# Patient Record
Sex: MALE | Race: WHITE | HISPANIC OR LATINO | Employment: OTHER | URBAN - METROPOLITAN AREA
[De-identification: names, ages, dates, MRNs, and addresses within clinical notes are randomized per-mention and may not be internally consistent; named-entity substitution may affect disease eponyms.]

---

## 2020-07-13 ENCOUNTER — OFFICE VISIT (OUTPATIENT)
Dept: URGENT CARE | Age: 30
End: 2020-07-13
Payer: COMMERCIAL

## 2020-07-13 VITALS
HEART RATE: 74 BPM | BODY MASS INDEX: 33.13 KG/M2 | TEMPERATURE: 98.1 F | HEIGHT: 73 IN | WEIGHT: 250 LBS | OXYGEN SATURATION: 99 %

## 2020-07-13 DIAGNOSIS — R43.2 LOSS OF TASTE: Primary | ICD-10-CM

## 2020-07-13 PROCEDURE — G0382 LEV 3 HOSP TYPE B ED VISIT: HCPCS | Performed by: PHYSICIAN ASSISTANT

## 2020-07-13 PROCEDURE — U0003 INFECTIOUS AGENT DETECTION BY NUCLEIC ACID (DNA OR RNA); SEVERE ACUTE RESPIRATORY SYNDROME CORONAVIRUS 2 (SARS-COV-2) (CORONAVIRUS DISEASE [COVID-19]), AMPLIFIED PROBE TECHNIQUE, MAKING USE OF HIGH THROUGHPUT TECHNOLOGIES AS DESCRIBED BY CMS-2020-01-R: HCPCS | Performed by: PHYSICIAN ASSISTANT

## 2020-07-13 RX ORDER — OMEPRAZOLE 20 MG/1
20 CAPSULE, DELAYED RELEASE ORAL 2 TIMES DAILY
COMMUNITY
Start: 2020-06-13 | End: 2022-01-26

## 2020-07-13 RX ORDER — LEVOTHYROXINE SODIUM 0.03 MG/1
25 TABLET ORAL DAILY
COMMUNITY
Start: 2020-04-09 | End: 2021-06-22

## 2020-07-13 NOTE — LETTER
July 13, 2020     Patient: Jayme Pritchard   YOB: 1990   Date of Visit: 7/13/2020       To Whom It May Concern: It is my medical opinion that Oleg Hoff should remain out of work until cleared by physician  If you have any questions or concerns, please don't hesitate to call           Sincerely,        Shelley Cintron PA-C    CC: No Recipients

## 2020-07-13 NOTE — PATIENT INSTRUCTIONS
Continue to monitor symptoms  If new or worsening symptoms develop, go immediately to Er  Drink plenty of fluids  Follow up with Family Doctor this week  [address]    Your healthcare provider and/or public health staff have evaluated you and have determined that you do not need to remain in the hospital at this time  At this time you can be isolated at home where you will be monitored by staff from your local or state health department  You should carefully follow the prevention and isolation steps below until a healthcare provider or local or state health department says that you can return to your normal activities  Stay home except to get medical care    People who are mildly ill with COVID-19 are able to isolate at home during their illness  You should restrict activities outside your home, except for getting medical care  Do not go to work, school, or public areas  Avoid using public transportation, ride-sharing, or taxis  Separate yourself from other people and animals in your home    People: As much as possible, you should stay in a specific room and away from other people in your home  Also, you should use a separate bathroom, if available  Animals: You should restrict contact with pets and other animals while you are sick with COVID-19, just like you would around other people  Although there have not been reports of pets or other animals becoming sick with COVID-19, it is still recommended that people sick with COVID-19 limit contact with animals until more information is known about the virus  When possible, have another member of your household care for your animals while you are sick  If you are sick with COVID-19, avoid contact with your pet, including petting, snuggling, being kissed or licked, and sharing food  If you must care for your pet or be around animals while you are sick, wash your hands before and after you interact with pets and wear a facemask   See COVID-19 and Animals for more information  Call ahead before visiting your doctor    If you have a medical appointment, call the healthcare provider and tell them that you have or may have COVID-19  This will help the healthcare providers office take steps to keep other people from getting infected or exposed  Wear a facemask    You should wear a facemask when you are around other people (e g , sharing a room or vehicle) or pets and before you enter a healthcare providers office  If you are not able to wear a facemask (for example, because it causes trouble breathing), then people who live with you should not stay in the same room with you, or they should wear a facemask if they enter your room  Cover your coughs and sneezes    Cover your mouth and nose with a tissue when you cough or sneeze  Throw used tissues in a lined trash can  Immediately wash your hands with soap and water for at least 20 seconds or, if soap and water are not available, clean your hands with an alcohol-based hand  that contains at least 60% alcohol  Clean your hands often    Wash your hands often with soap and water for at least 20 seconds, especially after blowing your nose, coughing, or sneezing; going to the bathroom; and before eating or preparing food  If soap and water are not readily available, use an alcohol-based hand  with at least 60% alcohol, covering all surfaces of your hands and rubbing them together until they feel dry  Soap and water are the best option if hands are visibly dirty  Avoid touching your eyes, nose, and mouth with unwashed hands  Avoid sharing personal household items    You should not share dishes, drinking glasses, cups, eating utensils, towels, or bedding with other people or pets in your home  After using these items, they should be washed thoroughly with soap and water      Clean all high-touch surfaces everyday    High touch surfaces include counters, tabletops, doorknobs, bathroom fixtures, toilets, phones, keyboards, tablets, and bedside tables  Also, clean any surfaces that may have blood, stool, or body fluids on them  Use a household cleaning spray or wipe, according to the label instructions  Labels contain instructions for safe and effective use of the cleaning product including precautions you should take when applying the product, such as wearing gloves and making sure you have good ventilation during use of the product  Monitor your symptoms    Seek prompt medical attention if your illness is worsening (e g , difficulty breathing)  Before seeking care, call your healthcare provider and tell them that you have, or are being evaluated for, COVID-19  Put on a facemask before you enter the facility  These steps will help the healthcare providers office to keep other people in the office or waiting room from getting infected or exposed  Ask your healthcare provider to call the local or [facility] health department  Persons who are placed under active monitoring or facilitated self-monitoring should follow instructions provided by their local health department or occupational health professionals, as appropriate  If you have a medical emergency and need to call 911, notify the dispatch personnel that you have, or are being evaluated for COVID-19  If possible, put on a facemask before emergency medical services arrive      Discontinuing home isolation    Patients with confirmed COVID-19 should remain under home isolation precautions until the following conditions are met:   - They have had no fever for at least 72 hours (that is three full days of no fever without the use medicine that reduces fevers)  AND  - other symptoms have improved (for example, when their cough or shortness of breath have improved)  AND  - at least 10 days have passed since their symptoms first appeared  Patients with confirmed COVID-19 should also notify close contacts (including their workplace) and ask that they self-quarantine  Currently, close contact is defined as being within 6 feet for 10 minutes or more from the period 48 hours before symptom onset to the time at which the patient went into isolation  Close contacts of patients diagnosed with COVID-19 should be instructed by the patient to self-quarantine for 14 days from the last time of their last contact with the patient       Source: [employer]

## 2020-07-13 NOTE — PROGRESS NOTES
[facility]        NAME: Kiran Redding is a 31 y o  male  : 1990    MRN: [id number]  DATE: 2020  TIME: 4:22 PM    Assessment and Plan   Loss of taste [R43 2]  1  Loss of taste  MISC COVID-19 TEST- Office Collection         Patient Instructions       Continue to monitor symptoms  If new or worsening symptoms develop, go immediately to Er  Drink plenty of fluids  Follow up with Family Doctor this week  [address]    Your healthcare provider and/or public health staff have evaluated you and have determined that you do not need to remain in the hospital at this time  At this time you can be isolated at home where you will be monitored by staff from your local or state health department  You should carefully follow the prevention and isolation steps below until a healthcare provider or local or state health department says that you can return to your normal activities  Stay home except to get medical care    People who are mildly ill with COVID-19 are able to isolate at home during their illness  You should restrict activities outside your home, except for getting medical care  Do not go to work, school, or public areas  Avoid using public transportation, ride-sharing, or taxis  Separate yourself from other people and animals in your home    People: As much as possible, you should stay in a specific room and away from other people in your home  Also, you should use a separate bathroom, if available  Animals: You should restrict contact with pets and other animals while you are sick with COVID-19, just like you would around other people  Although there have not been reports of pets or other animals becoming sick with COVID-19, it is still recommended that people sick with COVID-19 limit contact with animals until more information is known about the virus  When possible, have another member of your household care for your animals while you are sick   If you are sick with COVID-19, avoid contact with your pet, including petting, snuggling, being kissed or licked, and sharing food  If you must care for your pet or be around animals while you are sick, wash your hands before and after you interact with pets and wear a facemask  See COVID-19 and Animals for more information  Call ahead before visiting your doctor    If you have a medical appointment, call the healthcare provider and tell them that you have or may have COVID-19  This will help the healthcare providers office take steps to keep other people from getting infected or exposed  Wear a facemask    You should wear a facemask when you are around other people (e g , sharing a room or vehicle) or pets and before you enter a healthcare providers office  If you are not able to wear a facemask (for example, because it causes trouble breathing), then people who live with you should not stay in the same room with you, or they should wear a facemask if they enter your room  Cover your coughs and sneezes    Cover your mouth and nose with a tissue when you cough or sneeze  Throw used tissues in a lined trash can  Immediately wash your hands with soap and water for at least 20 seconds or, if soap and water are not available, clean your hands with an alcohol-based hand  that contains at least 60% alcohol  Clean your hands often    Wash your hands often with soap and water for at least 20 seconds, especially after blowing your nose, coughing, or sneezing; going to the bathroom; and before eating or preparing food  If soap and water are not readily available, use an alcohol-based hand  with at least 60% alcohol, covering all surfaces of your hands and rubbing them together until they feel dry  Soap and water are the best option if hands are visibly dirty  Avoid touching your eyes, nose, and mouth with unwashed hands      Avoid sharing personal household items    You should not share dishes, drinking glasses, cups, eating utensils, towels, or bedding with other people or pets in your home  After using these items, they should be washed thoroughly with soap and water  Clean all high-touch surfaces everyday    High touch surfaces include counters, tabletops, doorknobs, bathroom fixtures, toilets, phones, keyboards, tablets, and bedside tables  Also, clean any surfaces that may have blood, stool, or body fluids on them  Use a household cleaning spray or wipe, according to the label instructions  Labels contain instructions for safe and effective use of the cleaning product including precautions you should take when applying the product, such as wearing gloves and making sure you have good ventilation during use of the product  Monitor your symptoms    Seek prompt medical attention if your illness is worsening (e g , difficulty breathing)  Before seeking care, call your healthcare provider and tell them that you have, or are being evaluated for, COVID-19  Put on a facemask before you enter the facility  These steps will help the healthcare providers office to keep other people in the office or waiting room from getting infected or exposed  Ask your healthcare provider to call the local or [facility] health department  Persons who are placed under active monitoring or facilitated self-monitoring should follow instructions provided by their local health department or occupational health professionals, as appropriate  If you have a medical emergency and need to call 911, notify the dispatch personnel that you have, or are being evaluated for COVID-19  If possible, put on a facemask before emergency medical services arrive      Discontinuing home isolation    Patients with confirmed COVID-19 should remain under home isolation precautions until the following conditions are met:   - They have had no fever for at least 72 hours (that is three full days of no fever without the use medicine that reduces fevers)  AND  - other symptoms have improved (for example, when their cough or shortness of breath have improved)  AND  - at least 10 days have passed since their symptoms first appeared  Patients with confirmed COVID-19 should also notify close contacts (including their workplace) and ask that they self-quarantine  Currently, close contact is defined as being within 6 feet for 10 minutes or more from the period 48 hours before symptom onset to the time at which the patient went into isolation  Close contacts of patients diagnosed with COVID-19 should be instructed by the patient to self-quarantine for 14 days from the last time of their last contact with the patient  Source: [employer]       Chief Complaint     Chief Complaint   Patient presents with    Fatigue     symptoms started about a week ago  pt has body aches, headache, loose stools and loss of taste  symptoms started about a week feels like he has the flu  History of Present Illness       Sore Throat    This is a new problem  Episode onset: 1 week ago  The problem has been gradually worsening  Neither side of throat is experiencing more pain than the other  Maximum temperature: unk but pt has chills  The pain is mild (itching)  Associated symptoms include congestion, coughing and a hoarse voice  Pertinent negatives include no diarrhea, drooling, headaches, neck pain, shortness of breath, swollen glands, trouble swallowing or vomiting  He has had no exposure to strep or mono  He has tried nothing for the symptoms  The treatment provided no relief  Pt has numerous family members that are COVID(+)  Review of Systems   Review of Systems   Constitutional: Positive for chills and fatigue  Negative for diaphoresis and fever  HENT: Positive for congestion, hoarse voice, sinus pressure and sore throat   Negative for drooling, postnasal drip, sinus pain, sneezing and trouble swallowing  Eyes: Negative  Respiratory: Positive for cough  Negative for chest tightness, shortness of breath and wheezing  Cardiovascular: Negative  Negative for chest pain and palpitations  Gastrointestinal: Negative for abdominal distention, diarrhea, nausea and vomiting  Endocrine: Negative  Genitourinary: Negative for dysuria  Musculoskeletal: Positive for myalgias  Negative for neck pain  Skin: Negative for pallor and rash  Allergic/Immunologic: Negative  Neurological: Negative  Negative for light-headedness and headaches  Hematological: Negative  Psychiatric/Behavioral: Negative  Current Medications       Current Outpatient Medications:     levothyroxine 25 mcg tablet, Take 25 mcg by mouth daily, Disp: , Rfl:     omeprazole (PriLOSEC) 20 mg delayed release capsule, Take 20 mg by mouth 2 (two) times a day, Disp: , Rfl:     Current Allergies     Allergies as of 07/13/2020 - Reviewed 07/13/2020   Allergen Reaction Noted    Penicillins  07/13/2020            The following portions of the patient's history were reviewed and updated as appropriate: allergies, current medications, past family history, past medical history, past social history, past surgical history and problem list      History reviewed  No pertinent past medical history  History reviewed  No pertinent surgical history  History reviewed  No pertinent family history  Medications have been verified  Objective   Pulse 74   Temp 98 1 °F (36 7 °C)   Ht 6' 1" (1 854 m)   Wt 113 kg (250 lb)   SpO2 99%   BMI 32 98 kg/m²        Physical Exam     Physical Exam   Constitutional: He appears well-developed and well-nourished  No distress  HENT:   Head: Normocephalic and atraumatic  Right Ear: External ear normal    Left Ear: External ear normal    Nose: Nose normal    Mouth/Throat: Oropharynx is clear and moist  No oropharyngeal exudate     Eyes: Conjunctivae are normal  Right eye exhibits no discharge  Left eye exhibits no discharge  Neck: Normal range of motion  Neck supple  Cardiovascular: Normal rate, regular rhythm, normal heart sounds and intact distal pulses  Pulmonary/Chest: Effort normal and breath sounds normal  No respiratory distress  He has no wheezes  He has no rales  Lymphadenopathy:     He has no cervical adenopathy  Skin: Skin is warm  Capillary refill takes less than 2 seconds  No rash noted  He is not diaphoretic  No pallor  Nursing note and vitals reviewed

## 2020-07-23 LAB — SARS-COV-2 RNA SPEC QL NAA+PROBE: NOT DETECTED

## 2021-06-22 ENCOUNTER — OFFICE VISIT (OUTPATIENT)
Dept: INTERNAL MEDICINE CLINIC | Age: 31
End: 2021-06-22
Payer: COMMERCIAL

## 2021-06-22 VITALS
BODY MASS INDEX: 36.1 KG/M2 | WEIGHT: 272.4 LBS | TEMPERATURE: 97.5 F | HEART RATE: 90 BPM | HEIGHT: 73 IN | DIASTOLIC BLOOD PRESSURE: 80 MMHG | SYSTOLIC BLOOD PRESSURE: 122 MMHG | OXYGEN SATURATION: 97 %

## 2021-06-22 DIAGNOSIS — K21.9 GASTROESOPHAGEAL REFLUX DISEASE WITHOUT ESOPHAGITIS: ICD-10-CM

## 2021-06-22 DIAGNOSIS — R00.2 PALPITATIONS: ICD-10-CM

## 2021-06-22 DIAGNOSIS — E03.9 HYPOTHYROIDISM, UNSPECIFIED TYPE: Primary | ICD-10-CM

## 2021-06-22 PROCEDURE — 99203 OFFICE O/P NEW LOW 30 MIN: CPT | Performed by: INTERNAL MEDICINE

## 2021-06-22 PROCEDURE — 93000 ELECTROCARDIOGRAM COMPLETE: CPT | Performed by: INTERNAL MEDICINE

## 2021-06-22 RX ORDER — LEVOTHYROXINE SODIUM 0.07 MG/1
75 TABLET ORAL DAILY
Qty: 90 TABLET | Refills: 0 | Status: SHIPPED | OUTPATIENT
Start: 2021-06-22 | End: 2021-06-22

## 2021-06-22 RX ORDER — LEVOTHYROXINE SODIUM 0.05 MG/1
50 TABLET ORAL DAILY
Qty: 90 TABLET | Refills: 0 | Status: CANCELLED | OUTPATIENT
Start: 2021-06-22 | End: 2021-09-20

## 2021-06-22 RX ORDER — LEVOTHYROXINE SODIUM 0.05 MG/1
50 TABLET ORAL DAILY
COMMUNITY
End: 2021-06-22

## 2021-06-22 NOTE — PROGRESS NOTES
Assessment/Plan:    Gastroesophageal reflux disease without esophagitis  · Home meds: omeprazole 20mg daily     Plan:   · Continue home meds     Hypothyroidism  · Last TSH on record was 6/21/21  Slightly elevated at 5 68    · Home meds: Levothyroxine 50mcg for 6 months  Plan:   · Repeat TSH now and follow up in office in 1 month   · Consider increasing levothyroxine dose if workup is negative and TSH remains elevated    Palpitations  · EKG in office     Plan:   · Echocardiogram for further evaluation        Diagnoses and all orders for this visit:    Hypothyroidism, unspecified type  -     TSH, 3rd generation; Future  -     Discontinue: levothyroxine 75 mcg tablet; Take 1 tablet (75 mcg total) by mouth daily  -     POCT ECG    Gastroesophageal reflux disease without esophagitis    Palpitations  -     Echo complete with contrast if indicated; Future    Other orders  -     Cancel: levothyroxine 50 mcg tablet; Take 1 tablet (50 mcg total) by mouth daily  -     Discontinue: levothyroxine 50 mcg tablet; Take 50 mcg by mouth daily  -     VITAMIN D PO; Take by mouth  -     Ascorbic Acid (VITAMIN C PO); Take by mouth  -     Cyanocobalamin (VITAMIN B12 PO); Take by mouth  -     Multiple Vitamins-Minerals (MENS MULTIPLUS PO); Take by mouth          Subjective:   Chief Complaint   Patient presents with    [facility]    Thyroid Problem    Leg Pain     left side    Rapid Heart Rate     after COVID vaccine, has been having issues off and on since         Patient ID: Alok Agarwal is a 27 y o  male  27year old male w/ PMHx of hypothyroidism and GERD presenting as a new patient to establish care  Patient has been taking 50mcg levothyroxine for 6 months and recently had abnormal TSH (5 68) on 6/21/21  Admits to weight gain and difficulty losing weight  Of note, he has been complaining of chest discomfort and feels of "fast heart rate" as of recently following his COVID vaccine         The following portions of the patient's history were reviewed and updated as appropriate: allergies, current medications, past family history, past medical history, past social history, past surgical history and problem list     Review of Systems   Constitutional: Positive for fatigue and unexpected weight change  Negative for chills and fever  HENT: Negative for sinus pressure and sinus pain  Eyes: Negative for pain and redness  Respiratory: Negative for cough and shortness of breath  Cardiovascular: Negative for chest pain, palpitations and leg swelling  Gastrointestinal: Negative for abdominal pain, diarrhea, nausea and vomiting  Endocrine: Negative for cold intolerance and heat intolerance  Genitourinary: Negative for dysuria, frequency and urgency  Musculoskeletal: Negative for back pain and neck pain  Skin: Negative for color change and rash  Neurological: Negative for dizziness, weakness, numbness and headaches  Hematological: Negative for adenopathy  Does not bruise/bleed easily  Objective:      /80 (BP Location: Left arm, Patient Position: Sitting, Cuff Size: Large)   Pulse 90   Temp 97 5 °F (36 4 °C) (Temporal)   Ht 6' 1 43" (1 865 m) Comment: shoes on  Wt 124 kg (272 lb 6 4 oz) Comment: shoes on  SpO2 97%   BMI 35 52 kg/m²          Physical Exam  Constitutional:       General: He is not in acute distress  HENT:      Head: Normocephalic and atraumatic  Mouth/Throat:      Mouth: Mucous membranes are moist       Pharynx: Oropharynx is clear  Eyes:      Extraocular Movements: Extraocular movements intact  Pupils: Pupils are equal, round, and reactive to light  Cardiovascular:      Rate and Rhythm: Normal rate and regular rhythm  Heart sounds: No murmur heard  Pulmonary:      Effort: Pulmonary effort is normal       Breath sounds: Normal breath sounds  Abdominal:      General: Abdomen is flat  Bowel sounds are normal       Palpations: Abdomen is soft     Musculoskeletal: General: Normal range of motion  Cervical back: Normal range of motion and neck supple  Right lower leg: No edema  Left lower leg: No edema  Skin:     General: Skin is warm and dry  Neurological:      General: No focal deficit present  Mental Status: He is alert and oriented to person, place, and time  Mental status is at baseline  Psychiatric:         Mood and Affect: Mood normal          Thought Content:  Thought content normal

## 2021-06-22 NOTE — ASSESSMENT & PLAN NOTE
· Last TSH on record was 6/21/21  Slightly elevated at 5 68    · Home meds: Levothyroxine 50mcg for 6 months       Plan:   · Repeat TSH now and follow up in office in 1 month   · Consider increasing levothyroxine dose if workup is negative and TSH remains elevated

## 2021-06-23 ENCOUNTER — APPOINTMENT (OUTPATIENT)
Dept: LAB | Facility: HOSPITAL | Age: 31
End: 2021-06-23
Payer: COMMERCIAL

## 2021-06-23 DIAGNOSIS — E03.9 HYPOTHYROIDISM, UNSPECIFIED TYPE: ICD-10-CM

## 2021-06-23 LAB — TSH SERPL DL<=0.05 MIU/L-ACNC: 5.37 UIU/ML (ref 0.36–3.74)

## 2021-06-23 PROCEDURE — 84443 ASSAY THYROID STIM HORMONE: CPT

## 2021-06-23 PROCEDURE — 36415 COLL VENOUS BLD VENIPUNCTURE: CPT

## 2021-06-25 ENCOUNTER — TELEPHONE (OUTPATIENT)
Dept: INTERNAL MEDICINE CLINIC | Age: 31
End: 2021-06-25

## 2021-06-28 ENCOUNTER — TELEPHONE (OUTPATIENT)
Dept: INTERNAL MEDICINE CLINIC | Age: 31
End: 2021-06-28

## 2021-06-28 RX ORDER — ALBUTEROL SULFATE 90 UG/1
AEROSOL, METERED RESPIRATORY (INHALATION)
COMMUNITY
Start: 2021-05-16 | End: 2022-02-02 | Stop reason: SDUPTHER

## 2021-06-28 RX ORDER — PROMETHAZINE HYDROCHLORIDE AND CODEINE PHOSPHATE 6.25; 1 MG/5ML; MG/5ML
5 SYRUP ORAL DAILY PRN
COMMUNITY
Start: 2021-05-16 | End: 2022-01-26

## 2021-06-28 RX ORDER — DIPHENHYDRAMINE HYDROCHLORIDE 25 MG/1
TABLET ORAL
COMMUNITY
End: 2022-02-02

## 2021-06-28 NOTE — TELEPHONE ENCOUNTER
Patient has an appointment in August   He should keep that appointment but I sent a new prescription in for higher dose at 75 mcg 1 tablet daily  He should not throw away his previous medication dose for now  Start new 1 as soon as possible and check thyroid function test which I just placed in his chart 2 days prior to his next appointment    Thank you so much

## 2021-06-28 NOTE — TELEPHONE ENCOUNTER
Last note indicates that he is on levothyroxine 50 mg daily but I do not see it in his medication list   His dose needs to be adjusted since his thyroid level is showing hypo    Please verify

## 2021-06-28 NOTE — TELEPHONE ENCOUNTER
Pt looking for results of thyroid labs from 6/23/21  Anything to add before pt is called with results?

## 2022-01-07 ENCOUNTER — TELEMEDICINE (OUTPATIENT)
Dept: INTERNAL MEDICINE CLINIC | Age: 32
End: 2022-01-07
Payer: COMMERCIAL

## 2022-01-07 VITALS
BODY MASS INDEX: 36.05 KG/M2 | HEART RATE: 73 BPM | SYSTOLIC BLOOD PRESSURE: 125 MMHG | DIASTOLIC BLOOD PRESSURE: 78 MMHG | HEIGHT: 73 IN | WEIGHT: 272 LBS

## 2022-01-07 DIAGNOSIS — E03.9 ACQUIRED HYPOTHYROIDISM: Primary | ICD-10-CM

## 2022-01-07 DIAGNOSIS — H93.13 TINNITUS OF BOTH EARS: ICD-10-CM

## 2022-01-07 DIAGNOSIS — K21.9 GASTROESOPHAGEAL REFLUX DISEASE WITHOUT ESOPHAGITIS: ICD-10-CM

## 2022-01-07 PROCEDURE — 3725F SCREEN DEPRESSION PERFORMED: CPT | Performed by: INTERNAL MEDICINE

## 2022-01-07 PROCEDURE — 99214 OFFICE O/P EST MOD 30 MIN: CPT | Performed by: INTERNAL MEDICINE

## 2022-01-07 RX ORDER — LEVOTHYROXINE SODIUM 0.07 MG/1
75 TABLET ORAL DAILY
Qty: 90 TABLET | Refills: 1 | Status: SHIPPED | OUTPATIENT
Start: 2022-01-07 | End: 2022-03-28 | Stop reason: SDUPTHER

## 2022-01-17 ENCOUNTER — TELEPHONE (OUTPATIENT)
Dept: INTERNAL MEDICINE CLINIC | Age: 32
End: 2022-01-17

## 2022-01-17 NOTE — TELEPHONE ENCOUNTER
Patient called to get a form filled out for him to be excused from getting the 2nd COVID vaccine  Patient was seen by us on 06/22/2021 due to a reaction from the first one  He will be dropping off the form at the [place] office for Dr Chu Jean can fill it out    He was seen by him and Dr Cosby Covert on 06/22/2021    Patient is leaving on [place] and needs this completed

## 2022-01-18 NOTE — TELEPHONE ENCOUNTER
Letter has been typed up and signed off on by Dr Dawna Machado       Patient informed and will  letter in [facility]

## 2022-01-26 ENCOUNTER — OFFICE VISIT (OUTPATIENT)
Dept: INTERNAL MEDICINE CLINIC | Facility: OTHER | Age: 32
End: 2022-01-26
Payer: COMMERCIAL

## 2022-01-26 ENCOUNTER — APPOINTMENT (OUTPATIENT)
Dept: LAB | Facility: IMAGING CENTER | Age: 32
End: 2022-01-26
Payer: COMMERCIAL

## 2022-01-26 VITALS
WEIGHT: 274 LBS | SYSTOLIC BLOOD PRESSURE: 122 MMHG | TEMPERATURE: 97.6 F | HEIGHT: 73 IN | DIASTOLIC BLOOD PRESSURE: 90 MMHG | HEART RATE: 101 BPM | BODY MASS INDEX: 36.31 KG/M2 | OXYGEN SATURATION: 96 %

## 2022-01-26 DIAGNOSIS — K21.9 GASTROESOPHAGEAL REFLUX DISEASE WITHOUT ESOPHAGITIS: ICD-10-CM

## 2022-01-26 DIAGNOSIS — H93.13 TINNITUS OF BOTH EARS: ICD-10-CM

## 2022-01-26 DIAGNOSIS — E55.9 VITAMIN D DEFICIENCY: ICD-10-CM

## 2022-01-26 DIAGNOSIS — E03.9 ACQUIRED HYPOTHYROIDISM: ICD-10-CM

## 2022-01-26 DIAGNOSIS — Z13.228 SCREENING FOR METABOLIC DISORDER: Primary | ICD-10-CM

## 2022-01-26 DIAGNOSIS — I10 HYPERTENSION, UNSPECIFIED TYPE: ICD-10-CM

## 2022-01-26 DIAGNOSIS — Z13.228 SCREENING FOR METABOLIC DISORDER: ICD-10-CM

## 2022-01-26 LAB
ALBUMIN SERPL BCP-MCNC: 4.4 G/DL (ref 3.5–5)
ALP SERPL-CCNC: 103 U/L (ref 46–116)
ALT SERPL W P-5'-P-CCNC: 55 U/L (ref 12–78)
ANION GAP SERPL CALCULATED.3IONS-SCNC: 7 MMOL/L (ref 4–13)
AST SERPL W P-5'-P-CCNC: 34 U/L (ref 5–45)
BILIRUB SERPL-MCNC: 1.03 MG/DL (ref 0.2–1)
BUN SERPL-MCNC: 9 MG/DL (ref 5–25)
CALCIUM SERPL-MCNC: 10.3 MG/DL (ref 8.3–10.1)
CHLORIDE SERPL-SCNC: 103 MMOL/L (ref 100–108)
CHOLEST SERPL-MCNC: 210 MG/DL
CO2 SERPL-SCNC: 25 MMOL/L (ref 21–32)
CREAT SERPL-MCNC: 1.54 MG/DL (ref 0.6–1.3)
GFR SERPL CREATININE-BSD FRML MDRD: 58 ML/MIN/1.73SQ M
GLUCOSE SERPL-MCNC: 94 MG/DL (ref 65–140)
HDLC SERPL-MCNC: 36 MG/DL
LDLC SERPL CALC-MCNC: 146 MG/DL (ref 0–100)
NONHDLC SERPL-MCNC: 174 MG/DL
POTASSIUM SERPL-SCNC: 4.6 MMOL/L (ref 3.5–5.3)
PROT SERPL-MCNC: 8.2 G/DL (ref 6.4–8.2)
SODIUM SERPL-SCNC: 135 MMOL/L (ref 136–145)
T4 FREE SERPL-MCNC: 1.3 NG/DL (ref 0.76–1.46)
TRIGL SERPL-MCNC: 142 MG/DL
TSH SERPL DL<=0.05 MIU/L-ACNC: 3.33 UIU/ML (ref 0.36–3.74)

## 2022-01-26 PROCEDURE — 3008F BODY MASS INDEX DOCD: CPT | Performed by: NURSE PRACTITIONER

## 2022-01-26 PROCEDURE — 84439 ASSAY OF FREE THYROXINE: CPT

## 2022-01-26 PROCEDURE — 80053 COMPREHEN METABOLIC PANEL: CPT

## 2022-01-26 PROCEDURE — 80061 LIPID PANEL: CPT | Performed by: NURSE PRACTITIONER

## 2022-01-26 PROCEDURE — 36415 COLL VENOUS BLD VENIPUNCTURE: CPT

## 2022-01-26 PROCEDURE — 99214 OFFICE O/P EST MOD 30 MIN: CPT | Performed by: NURSE PRACTITIONER

## 2022-01-26 PROCEDURE — 1036F TOBACCO NON-USER: CPT | Performed by: NURSE PRACTITIONER

## 2022-01-26 PROCEDURE — 84443 ASSAY THYROID STIM HORMONE: CPT

## 2022-01-26 RX ORDER — BETAHISTINE HCL 100 %
24 POWDER (GRAM) MISCELLANEOUS
COMMUNITY
End: 2022-01-26

## 2022-01-26 RX ORDER — MECLIZINE HYDROCHLORIDE 25 MG/1
25 TABLET ORAL 3 TIMES DAILY PRN
Qty: 30 TABLET | Refills: 0 | Status: SHIPPED | OUTPATIENT
Start: 2022-01-26 | End: 2022-02-02

## 2022-01-26 RX ORDER — CAPTOPRIL 50 MG/1
50 TABLET ORAL DAILY
COMMUNITY
End: 2022-01-26 | Stop reason: SDUPTHER

## 2022-01-26 RX ORDER — CAPTOPRIL 50 MG/1
50 TABLET ORAL DAILY
Qty: 90 TABLET | Refills: 1 | Status: SHIPPED | OUTPATIENT
Start: 2022-01-26 | End: 2022-02-02

## 2022-01-26 RX ORDER — FLUTICASONE PROPIONATE 50 MCG
1 SPRAY, SUSPENSION (ML) NASAL DAILY
Qty: 16 G | Refills: 5 | Status: SHIPPED | OUTPATIENT
Start: 2022-01-26

## 2022-01-26 NOTE — ASSESSMENT & PLAN NOTE
Will continue with Captopril  Continue current regimen -   Continue to monitor blood pressure at home  Goal BP is < 130/80  Contact our office for consistent elevations  Recommend low sodium diet  Exercise 30 minutes 5 times a week as tolerated    Recommend yearly eye exam

## 2022-01-26 NOTE — ASSESSMENT & PLAN NOTE
Was evaluated by ears Nose and Throat doctor in [facility] and was noted to possibly have Meniere's disease and deviated septum  Will refer to ENT  Will start flonase

## 2022-01-26 NOTE — PROGRESS NOTES
Assessment/Plan:    Hypothyroidism  Will get TSH, patient currently taking levothyroxine 75 mcg tablet daily  Tinnitus of both ears  Was evaluated by ears Nose and Throat doctor in [facility] and was noted to possibly have Meniere's disease and deviated septum  Will refer to ENT  Will start flonase  Screening for metabolic disorder  Will get fasting blood work  Hypertension  Will continue with Captopril  Continue current regimen -   Continue to monitor blood pressure at home  Goal BP is < 130/80  Contact our office for consistent elevations  Recommend low sodium diet  Exercise 30 minutes 5 times a week as tolerated  Recommend yearly eye exam                  Diagnoses and all orders for this visit:    Screening for metabolic disorder  -     CBC and differential  -     Comprehensive metabolic panel; Future  -     Lipid panel    Hypertension, unspecified type  -     captopril (CAPOTEN) 50 mg tablet; Take 1 tablet (50 mg total) by mouth in the morning    Vitamin D deficiency  -     Vitamin D 25 hydroxy    Tinnitus of both ears  -     Ambulatory Referral to Otolaryngology; Future  -     meclizine (ANTIVERT) 25 mg tablet; Take 1 tablet (25 mg total) by mouth 3 (three) times a day as needed for dizziness or nausea  -     fluticasone (FLONASE) 50 mcg/act nasal spray; 1 spray into each nostril daily    Gastroesophageal reflux disease without esophagitis    Acquired hypothyroidism    Other orders  -     Discontinue: Betahistine HCl (Betahistine Dihydrochloride) POWD; Use 24 mg  -     Discontinue: captopril (CAPOTEN) 50 mg tablet; Take 50 mg by mouth in the morning          Subjective:      Patient ID: León Calero is a 27 y o  male  Patient presents today for tinnitus and hypertension  Patient currently lives in the [place] however was visiting [facility] for a wedding    Patient had developed tinnitus approximately 3 weeks ago, which had gotten worse when he was in [facility] at that time he was evaluated by 2 separate ENTs and was told he was developing Meniere's disease and had a deviated septum  He was given medications in [facility] which were helpful but he continues to have episodes of dizziness and ringing in his ears  He does report decreased hearing bilaterally  He reports when he gets the episodes of dizziness that the room spins around him and developed associated nausea with these episodes  Hypertension-patient was started on medication in [facility], and blood pressure seems to be well controlled at this time on this medication, denies CP, SOB and palpations  Hypothyroidism-reports compliance with levothyroxine he takes his medication patient denies any recent TSH, had blood work done today        The following portions of the patient's history were reviewed and updated as appropriate: allergies, current medications, past family history, past medical history, past social history, past surgical history and problem list     Review of Systems   Constitutional: Negative for activity change, appetite change, chills, diaphoresis and fever  HENT: Positive for tinnitus  Negative for congestion, ear discharge, ear pain, postnasal drip, rhinorrhea, sinus pressure, sinus pain and sore throat  Eyes: Negative for pain, discharge, itching and visual disturbance  Respiratory: Negative for cough, chest tightness, shortness of breath and wheezing  Cardiovascular: Negative for chest pain, palpitations and leg swelling  Gastrointestinal: Positive for nausea  Negative for abdominal pain, constipation, diarrhea and vomiting  Endocrine: Negative for polydipsia, polyphagia and polyuria  Genitourinary: Negative for difficulty urinating, dysuria and urgency  Musculoskeletal: Negative for arthralgias, back pain and neck pain  Skin: Negative for rash and wound  Neurological: Positive for dizziness  Negative for weakness, numbness and headaches           Past Medical History:   Diagnosis Date    Disease of thyroid gland     GERD (gastroesophageal reflux disease)     Headache(784 0) 12/27/2021    Headachs    Hyperlipidemia     Irregular heart beat     Varicose vein of leg          Current Outpatient Medications:     captopril (CAPOTEN) 50 mg tablet, Take 1 tablet (50 mg total) by mouth in the morning, Disp: 90 tablet, Rfl: 1    albuterol (PROVENTIL HFA,VENTOLIN HFA) 90 mcg/act inhaler, INHALE 2 PUFFS EVERY 4 (FOUR) HOURS AS NEEDED FOR WHEEZING OR SHORTNESS OF BREATH , Disp: , Rfl:     Ascorbic Acid (VITAMIN C PO), Take by mouth, Disp: , Rfl:     Biotin 5 MG CAPS, Take by mouth, Disp: , Rfl:     Cyanocobalamin (VITAMIN B12 PO), Take by mouth, Disp: , Rfl:     fluticasone (FLONASE) 50 mcg/act nasal spray, 1 spray into each nostril daily, Disp: 16 g, Rfl: 5    levothyroxine 75 mcg tablet, Take 1 tablet (75 mcg total) by mouth daily, Disp: 90 tablet, Rfl: 1    meclizine (ANTIVERT) 25 mg tablet, Take 1 tablet (25 mg total) by mouth 3 (three) times a day as needed for dizziness or nausea, Disp: 30 tablet, Rfl: 0    Multiple Vitamins-Minerals (MENS MULTIPLUS PO), Take by mouth, Disp: , Rfl:     VITAMIN D PO, Take by mouth, Disp: , Rfl:     Allergies   Allergen Reactions    Penicillins Hives       Social History   History reviewed  No pertinent surgical history    Family History   Problem Relation Age of Onset    Hypothyroidism Mother     BARBARA disease Mother     Hypertension Father     Heart disease Maternal Grandfather     Heart disease Paternal Grandfather        Objective:  /90 (BP Location: Left arm, Patient Position: Sitting, Cuff Size: Large)   Pulse 101   Temp 97 6 °F (36 4 °C) (Temporal)   Ht 6' 1" (1 854 m)   Wt 124 kg (274 lb)   SpO2 96% Comment: ra  BMI 36 15 kg/m²     Recent Results (from the past 1344 hour(s))   [facility]    Collection Time: 01/08/22 12:45 PM    Specimen: Other   Result Value Ref Range    SARS-CoV-2 Negative Negative   NOVEL CORONAVIRUS (COVID-19), PCR [facility]    Collection Time: 01/17/22  3:39 PM    Specimen: Other   Result Value Ref Range    SARS-CoV-2 Negative Negative   NOVEL CORONAVIRUS (COVID-19), PCR [facility]    Collection Time: 01/18/22  1:15 PM    Specimen: Other   Result Value Ref Range    SARS-CoV-2 Negative Negative            Physical Exam  Constitutional:       General: He is not in acute distress  Appearance: He is well-developed  He is not diaphoretic  HENT:      Head: Normocephalic and atraumatic  Right Ear: External ear normal  A middle ear effusion is present  Tympanic membrane is bulging  Tympanic membrane is not erythematous  Left Ear: External ear normal  A middle ear effusion is present  Tympanic membrane is bulging  Tympanic membrane is not erythematous  Nose: Nose normal       Mouth/Throat:      Pharynx: No oropharyngeal exudate  Eyes:      General:         Right eye: No discharge  Left eye: No discharge  Conjunctiva/sclera: Conjunctivae normal       Pupils: Pupils are equal, round, and reactive to light  Neck:      Thyroid: No thyromegaly  Cardiovascular:      Rate and Rhythm: Normal rate and regular rhythm  Heart sounds: Normal heart sounds  No murmur heard  No friction rub  No gallop  Pulmonary:      Effort: Pulmonary effort is normal  No respiratory distress  Breath sounds: Normal breath sounds  No stridor  No wheezing or rales  Abdominal:      General: Bowel sounds are normal  There is no distension  Palpations: Abdomen is soft  Tenderness: There is no abdominal tenderness  Musculoskeletal:      Cervical back: Normal range of motion and neck supple  Lymphadenopathy:      Cervical: No cervical adenopathy  Skin:     General: Skin is warm and dry  Findings: No erythema or rash  Neurological:      Mental Status: He is alert and oriented to person, place, and time  Psychiatric:         Behavior: Behavior normal          Thought Content:  Thought content normal          Judgment: Judgment normal

## 2022-01-27 ENCOUNTER — APPOINTMENT (OUTPATIENT)
Dept: LAB | Facility: IMAGING CENTER | Age: 32
End: 2022-01-27
Payer: COMMERCIAL

## 2022-01-27 ENCOUNTER — TELEPHONE (OUTPATIENT)
Dept: INTERNAL MEDICINE CLINIC | Facility: OTHER | Age: 32
End: 2022-01-27

## 2022-01-27 LAB
25(OH)D3 SERPL-MCNC: 16.8 NG/ML (ref 30–100)
BASOPHILS # BLD AUTO: 0.04 THOUSANDS/ΜL (ref 0–0.1)
BASOPHILS NFR BLD AUTO: 1 % (ref 0–1)
EOSINOPHIL # BLD AUTO: 0.1 THOUSAND/ΜL (ref 0–0.61)
EOSINOPHIL NFR BLD AUTO: 2 % (ref 0–6)
ERYTHROCYTE [DISTWIDTH] IN BLOOD BY AUTOMATED COUNT: 12.6 % (ref 11.6–15.1)
HCT VFR BLD AUTO: 54.5 % (ref 36.5–49.3)
HGB BLD-MCNC: 18.9 G/DL (ref 12–17)
IMM GRANULOCYTES # BLD AUTO: 0.01 THOUSAND/UL (ref 0–0.2)
IMM GRANULOCYTES NFR BLD AUTO: 0 % (ref 0–2)
LYMPHOCYTES # BLD AUTO: 1.98 THOUSANDS/ΜL (ref 0.6–4.47)
LYMPHOCYTES NFR BLD AUTO: 44 % (ref 14–44)
MCH RBC QN AUTO: 29.6 PG (ref 26.8–34.3)
MCHC RBC AUTO-ENTMCNC: 34.7 G/DL (ref 31.4–37.4)
MCV RBC AUTO: 85 FL (ref 82–98)
MONOCYTES # BLD AUTO: 0.83 THOUSAND/ΜL (ref 0.17–1.22)
MONOCYTES NFR BLD AUTO: 18 % (ref 4–12)
NEUTROPHILS # BLD AUTO: 1.61 THOUSANDS/ΜL (ref 1.85–7.62)
NEUTS SEG NFR BLD AUTO: 35 % (ref 43–75)
NRBC BLD AUTO-RTO: 0 /100 WBCS
PLATELET # BLD AUTO: 254 THOUSANDS/UL (ref 149–390)
PMV BLD AUTO: 9.3 FL (ref 8.9–12.7)
RBC # BLD AUTO: 6.39 MILLION/UL (ref 3.88–5.62)
WBC # BLD AUTO: 4.57 THOUSAND/UL (ref 4.31–10.16)

## 2022-01-27 PROCEDURE — 82306 VITAMIN D 25 HYDROXY: CPT | Performed by: NURSE PRACTITIONER

## 2022-01-27 PROCEDURE — 36415 COLL VENOUS BLD VENIPUNCTURE: CPT | Performed by: NURSE PRACTITIONER

## 2022-01-27 PROCEDURE — 85025 COMPLETE CBC W/AUTO DIFF WBC: CPT | Performed by: NURSE PRACTITIONER

## 2022-01-27 NOTE — TELEPHONE ENCOUNTER
meclizine is making him not focus, very tired, he took it once today bc he is so out of it  He said he cant even take care of his child  He is asking for someone to review this and his labs and call him back

## 2022-01-31 ENCOUNTER — RA CDI HCC (OUTPATIENT)
Dept: OTHER | Facility: HOSPITAL | Age: 32
End: 2022-01-31

## 2022-01-31 NOTE — PROGRESS NOTES
[facility] 75  coding opportunities       Chart reviewed, no opportunity found: CHART REVIEWED, NO OPPORTUNITY FOUND                        Patients insurance company: [employer] (Medicare Advantage and Commercial)

## 2022-02-02 ENCOUNTER — OFFICE VISIT (OUTPATIENT)
Dept: INTERNAL MEDICINE CLINIC | Facility: OTHER | Age: 32
End: 2022-02-02
Payer: COMMERCIAL

## 2022-02-02 VITALS
TEMPERATURE: 97.8 F | SYSTOLIC BLOOD PRESSURE: 130 MMHG | HEART RATE: 83 BPM | WEIGHT: 277 LBS | OXYGEN SATURATION: 97 % | DIASTOLIC BLOOD PRESSURE: 94 MMHG | BODY MASS INDEX: 36.71 KG/M2 | HEIGHT: 73 IN

## 2022-02-02 DIAGNOSIS — R00.2 PALPITATIONS: ICD-10-CM

## 2022-02-02 DIAGNOSIS — E78.2 MIXED HYPERLIPIDEMIA: ICD-10-CM

## 2022-02-02 DIAGNOSIS — R63.1 POLYDIPSIA: ICD-10-CM

## 2022-02-02 DIAGNOSIS — R79.89 ELEVATED SERUM CREATININE: ICD-10-CM

## 2022-02-02 DIAGNOSIS — J45.909 UNCOMPLICATED ASTHMA, UNSPECIFIED ASTHMA SEVERITY, UNSPECIFIED WHETHER PERSISTENT: Primary | ICD-10-CM

## 2022-02-02 DIAGNOSIS — D58.2 ELEVATED HEMOGLOBIN (HCC): ICD-10-CM

## 2022-02-02 DIAGNOSIS — H93.13 TINNITUS OF BOTH EARS: ICD-10-CM

## 2022-02-02 DIAGNOSIS — I10 HYPERTENSION, UNSPECIFIED TYPE: ICD-10-CM

## 2022-02-02 DIAGNOSIS — E03.9 ACQUIRED HYPOTHYROIDISM: ICD-10-CM

## 2022-02-02 DIAGNOSIS — E55.9 VITAMIN D DEFICIENCY: ICD-10-CM

## 2022-02-02 PROCEDURE — 99214 OFFICE O/P EST MOD 30 MIN: CPT | Performed by: NURSE PRACTITIONER

## 2022-02-02 RX ORDER — ERGOCALCIFEROL 1.25 MG/1
50000 CAPSULE ORAL 2 TIMES WEEKLY
Qty: 24 CAPSULE | Refills: 0 | Status: SHIPPED | OUTPATIENT
Start: 2022-02-03 | End: 2022-03-28 | Stop reason: SDUPTHER

## 2022-02-02 RX ORDER — ALBUTEROL SULFATE 90 UG/1
2 AEROSOL, METERED RESPIRATORY (INHALATION) EVERY 4 HOURS PRN
Qty: 18 G | Refills: 0 | Status: SHIPPED | OUTPATIENT
Start: 2022-02-02

## 2022-02-02 NOTE — ASSESSMENT & PLAN NOTE
Patient would like to restart cardiac evaluation had seen cardiology in the past, advised to get a echocardiogram that was ordered by them, I will also get Holter monitor

## 2022-02-02 NOTE — ASSESSMENT & PLAN NOTE
Continue to monitor off of statin  Recommend healthy lifestyle choices for your cholesterol  Low fat/low cholesterol diet  Limit/avoid red meat  Eat more lean meat - chicken breast, ground turkey, fish  Exercise 30 mins at least 5 times a week as tolerated

## 2022-02-02 NOTE — PROGRESS NOTES
Assessment/Plan:    Hypothyroidism  Currently stable will continue level thyroxine 75 mcg tablet daily  Hypertension  Will continue with Captopril  Continue current regimen -   Continue to monitor blood pressure at home  Goal BP is < 130/80  Contact our office for consistent elevations  Recommend low sodium diet  Exercise 30 minutes 5 times a week as tolerated  Recommend yearly eye exam        Palpitations  Patient would like to restart cardiac evaluation had seen cardiology in the past, advised to get a echocardiogram that was ordered by them, I will also get Holter monitor  Tinnitus of both ears  Continue to follow with ENT  Mixed hyperlipidemia  Continue to monitor off of statin  Recommend healthy lifestyle choices for your cholesterol  Low fat/low cholesterol diet  Limit/avoid red meat  Eat more lean meat - chicken breast, ground turkey, fish  Exercise 30 mins at least 5 times a week as tolerated  Elevated hemoglobin (HCC)  Recently was an area of high [place], will get repeat CBC in approximately 2-3 weeks  Elevated serum creatinine  Advised to increase water intake, will follow-up in 2-3 weeks  Vitamin D deficiency  Will start vitamin-D supplementation  Polydipsia  Will get hemoglobin A1c  Diagnoses and all orders for this visit:    Uncomplicated asthma, unspecified asthma severity, unspecified whether persistent  -     albuterol (PROVENTIL HFA,VENTOLIN HFA) 90 mcg/act inhaler; Inhale 2 puffs every 4 (four) hours as needed for wheezing    Polydipsia  -     Hemoglobin A1C  -     CBC and differential; Future  -     Comprehensive metabolic panel; Future    Vitamin D deficiency  -     ergocalciferol (VITAMIN D2) 50,000 units; Take 1 capsule (50,000 Units total) by mouth 2 (two) times a week    Palpitations  -     Ambulatory Referral to Cardiology; Future  -     Holter monitor;  Future    Acquired hypothyroidism    Hypertension, unspecified type    Tinnitus of both ears    Mixed hyperlipidemia    Elevated hemoglobin (HCC)    Elevated serum creatinine          Subjective:      Patient ID: Carmita Lopez is a 27 y o  male  Patient presents today to review blood work  Patient had been evaluated by ENT for tinnitus which has been improving  Note from last office visit:  Patient presents today for tinnitus and hypertension  Patient currently lives in the [place] however was visiting [facility] for a wedding  Patient had developed tinnitus approximately 3 weeks ago, which had gotten worse when he was in [facility] at that time he was evaluated by 2 separate ENTs and was told he was developing Meniere's disease and had a deviated septum  He was given medications in [facility] which were helpful but he continues to have episodes of dizziness and ringing in his ears  He does report decreased hearing bilaterally  He reports when he gets the episodes of dizziness that the room spins around him and developed associated nausea with these episodes  Hypertension-patient was started on medication in [facility], and blood pressure seems to be well controlled at this time on this medication, denies CP, SOB and palpations  Hypothyroidism-reports compliance with levothyroxine he takes his medication patient denies any recent TSH, had blood work done today      He reports that he is super thirsty       The following portions of the patient's history were reviewed and updated as appropriate: allergies, current medications, past family history, past medical history, past social history, past surgical history and problem list     Review of Systems   Constitutional: Negative for activity change, appetite change, chills, diaphoresis and fever  HENT: Negative for congestion, ear discharge, ear pain, postnasal drip, rhinorrhea, sinus pressure, sinus pain and sore throat  Eyes: Negative for pain, discharge, itching and visual disturbance     Respiratory: Negative for cough, chest tightness, shortness of breath and wheezing  Cardiovascular: Negative for chest pain, palpitations and leg swelling  Gastrointestinal: Negative for abdominal pain, constipation, diarrhea, nausea and vomiting  Endocrine: Positive for polyphagia  Negative for polydipsia and polyuria  Genitourinary: Negative for difficulty urinating, dysuria and urgency  Musculoskeletal: Negative for arthralgias, back pain and neck pain  Skin: Negative for rash and wound  Neurological: Negative for dizziness, weakness, numbness and headaches           Past Medical History:   Diagnosis Date    GERD (gastroesophageal reflux disease)     Headache(784 0) 12/27/2021    Headachs    Hyperlipidemia     Hypertension     Hypothyroid     Irregular heart beat     Varicose vein of leg          Current Outpatient Medications:     albuterol (PROVENTIL HFA,VENTOLIN HFA) 90 mcg/act inhaler, Inhale 2 puffs every 4 (four) hours as needed for wheezing, Disp: 18 g, Rfl: 0    fluticasone (FLONASE) 50 mcg/act nasal spray, 1 spray into each nostril daily, Disp: 16 g, Rfl: 5    levothyroxine 75 mcg tablet, Take 1 tablet (75 mcg total) by mouth daily, Disp: 90 tablet, Rfl: 1    Multiple Vitamins-Minerals (MENS MULTIPLUS PO), Take by mouth in the morning  , Disp: , Rfl:     [START ON 2/3/2022] ergocalciferol (VITAMIN D2) 50,000 units, Take 1 capsule (50,000 Units total) by mouth 2 (two) times a week, Disp: 24 capsule, Rfl: 0    Allergies   Allergen Reactions    Penicillins Hives       Social History   Past Surgical History:   Procedure Laterality Date    PILONIDAL CYST EXCISION       Family History   Problem Relation Age of Onset    Hypothyroidism Mother     BARBARA disease Mother     Hypertension Father     Heart disease Maternal Grandfather     Heart disease Paternal Grandfather        Objective:  /94 (BP Location: Left arm, Patient Position: Sitting, Cuff Size: Large)   Pulse 83   Temp 97 8 °F (36 6 °C) (Temporal)   Ht 6' 1" (1 854 m)   Wt 126 kg (277 lb)   SpO2 97% Comment: ra  BMI 36 55 kg/m²     Recent Results (from the past 1344 hour(s))   NOVEL CORONAVIRUS (COVID-19), PCR [facility]    Collection Time: 01/08/22 12:45 PM    Specimen: Other   Result Value Ref Range    SARS-CoV-2 Negative Negative   NOVEL CORONAVIRUS (COVID-19), PCR [facility]    Collection Time: 01/17/22  3:39 PM    Specimen: Other   Result Value Ref Range    SARS-CoV-2 Negative Negative   NOVEL CORONAVIRUS (COVID-19), PCR [facility]    Collection Time: 01/18/22  1:15 PM    Specimen: Other   Result Value Ref Range    SARS-CoV-2 Negative Negative   TSH, 3rd generation    Collection Time: 01/26/22  9:12 AM   Result Value Ref Range    TSH 3RD GENERATON 3 330 0 358 - 3 740 uIU/mL   T4, free    Collection Time: 01/26/22  9:12 AM   Result Value Ref Range    Free T4 1 30 0 76 - 1 46 ng/dL   Lipid panel    Collection Time: 01/26/22  9:12 AM   Result Value Ref Range    Cholesterol 210 (H) See Comment mg/dL    Triglycerides 142 See Comment mg/dL    HDL, Direct 36 (L) >=40 mg/dL    LDL Calculated 146 (H) 0 - 100 mg/dL    Non-HDL-Chol (CHOL-HDL) 174 mg/dl   Comprehensive metabolic panel    Collection Time: 01/26/22  9:12 AM   Result Value Ref Range    Sodium 135 (L) 136 - 145 mmol/L    Potassium 4 6 3 5 - 5 3 mmol/L    Chloride 103 100 - 108 mmol/L    CO2 25 21 - 32 mmol/L    ANION GAP 7 4 - 13 mmol/L    BUN 9 5 - 25 mg/dL    Creatinine 1 54 (H) 0 60 - 1 30 mg/dL    Glucose 94 65 - 140 mg/dL    Calcium 10 3 (H) 8 3 - 10 1 mg/dL    AST 34 5 - 45 U/L    ALT 55 12 - 78 U/L    Alkaline Phosphatase 103 46 - 116 U/L    Total Protein 8 2 6 4 - 8 2 g/dL    Albumin 4 4 3 5 - 5 0 g/dL    Total Bilirubin 1 03 (H) 0 20 - 1 00 mg/dL    eGFR 58 ml/min/1 73sq m   CBC and differential    Collection Time: 01/27/22  9:06 AM   Result Value Ref Range    WBC 4 57 4 31 - 10 16 Thousand/uL    RBC 6 39 (H) 3 88 - 5 62 Million/uL    Hemoglobin 18 9 (H) 12 0 - 17 0 g/dL    Hematocrit 54 5 (H) 36 5 - 49 3 %    MCV 85 82 - 98 fL    MCH 29 6 26 8 - 34 3 pg    MCHC 34 7 31 4 - 37 4 g/dL    RDW 12 6 11 6 - 15 1 %    MPV 9 3 8 9 - 12 7 fL    Platelets 063 616 - 537 Thousands/uL    nRBC 0 /100 WBCs    Neutrophils Relative 35 (L) 43 - 75 %    Immat GRANS % 0 0 - 2 %    Lymphocytes Relative 44 14 - 44 %    Monocytes Relative 18 (H) 4 - 12 %    Eosinophils Relative 2 0 - 6 %    Basophils Relative 1 0 - 1 %    Neutrophils Absolute 1 61 (L) 1 85 - 7 62 Thousands/µL    Immature Grans Absolute 0 01 0 00 - 0 20 Thousand/uL    Lymphocytes Absolute 1 98 0 60 - 4 47 Thousands/µL    Monocytes Absolute 0 83 0 17 - 1 22 Thousand/µL    Eosinophils Absolute 0 10 0 00 - 0 61 Thousand/µL    Basophils Absolute 0 04 0 00 - 0 10 Thousands/µL   Vitamin D 25 hydroxy    Collection Time: 01/27/22  9:06 AM   Result Value Ref Range    Vit D, 25-Hydroxy 16 8 (L) 30 0 - 100 0 ng/mL            Physical Exam  Constitutional:       General: He is not in acute distress  Appearance: He is well-developed  He is not diaphoretic  HENT:      Head: Normocephalic and atraumatic  Right Ear: External ear normal       Left Ear: External ear normal       Nose: Nose normal       Mouth/Throat:      Pharynx: No oropharyngeal exudate  Eyes:      General:         Right eye: No discharge  Left eye: No discharge  Conjunctiva/sclera: Conjunctivae normal       Pupils: Pupils are equal, round, and reactive to light  Neck:      Thyroid: No thyromegaly  Cardiovascular:      Rate and Rhythm: Normal rate and regular rhythm  Heart sounds: Normal heart sounds  No murmur heard  No friction rub  No gallop  Pulmonary:      Effort: Pulmonary effort is normal  No respiratory distress  Breath sounds: Normal breath sounds  No stridor  No wheezing or rales  Abdominal:      General: Bowel sounds are normal  There is no distension  Palpations: Abdomen is soft  Tenderness: There is no abdominal tenderness     Musculoskeletal:      Cervical back: Normal range of motion and neck supple  Lymphadenopathy:      Cervical: No cervical adenopathy  Skin:     General: Skin is warm and dry  Findings: No erythema or rash  Neurological:      Mental Status: He is alert and oriented to person, place, and time  Psychiatric:         Behavior: Behavior normal          Thought Content:  Thought content normal          Judgment: Judgment normal

## 2022-02-09 ENCOUNTER — HOSPITAL ENCOUNTER (OUTPATIENT)
Dept: NON INVASIVE DIAGNOSTICS | Facility: HOSPITAL | Age: 32
Discharge: HOME | End: 2022-02-09
Payer: COMMERCIAL

## 2022-02-09 DIAGNOSIS — R00.2 PALPITATIONS: ICD-10-CM

## 2022-02-09 PROCEDURE — 93225 XTRNL ECG REC<48 HRS REC: CPT

## 2022-02-09 PROCEDURE — 93226 XTRNL ECG REC<48 HR SCAN A/R: CPT

## 2022-02-21 ENCOUNTER — TELEPHONE (OUTPATIENT)
Dept: INTERNAL MEDICINE CLINIC | Facility: OTHER | Age: 32
End: 2022-02-21

## 2022-02-21 NOTE — TELEPHONE ENCOUNTER
Patient called back stating that he will come into the office tomorrow AM for an appointment with Aj Zamudio  IN the interim he was advised to seek ER evaluation for any chest pain, fluttering with dyspnea or dizziness  He verbalized understanding  I did inform him that the holtor monitor could not be read due to the disconnected wire

## 2022-02-21 NOTE — TELEPHONE ENCOUNTER
I called the patient for additional information  I called cardiology and it appears that the Holtor monitor could not be read due to poor quality  The patient did say that he noticed that there was a wire disconnected from the monitor when he went to take it off  The report does not give a reading and just says poor quality  The patient states that he has the fluttering several times/day  Today no chest pain , dizziness or chest pain  Several days ago he had an episode with these symptoms  Appt offered and patient declined, stating  that he has to work  He is a  and he needs to get back to work  He has not scheduled an appointment because he said that the holtor report was going to determine if this referral was necessary

## 2022-02-21 NOTE — TELEPHONE ENCOUNTER
Pt called would like to get results of Holter monitor  He has his symptoms still  He had 2 days of  flutter feeling  In chest/heart, then he coughs, he had cough for a month  Ringing in ear has not gone away  He did see specialist, its louder than it was

## 2022-02-22 ENCOUNTER — TELEMEDICINE (OUTPATIENT)
Dept: INTERNAL MEDICINE CLINIC | Facility: OTHER | Age: 32
End: 2022-02-22
Payer: COMMERCIAL

## 2022-02-22 VITALS — WEIGHT: 277 LBS | HEIGHT: 73 IN | BODY MASS INDEX: 36.71 KG/M2

## 2022-02-22 DIAGNOSIS — E03.9 ACQUIRED HYPOTHYROIDISM: ICD-10-CM

## 2022-02-22 DIAGNOSIS — R00.2 PALPITATIONS: Primary | ICD-10-CM

## 2022-02-22 DIAGNOSIS — H93.13 TINNITUS OF BOTH EARS: ICD-10-CM

## 2022-02-22 DIAGNOSIS — R05.9 COUGH: ICD-10-CM

## 2022-02-22 PROCEDURE — 99213 OFFICE O/P EST LOW 20 MIN: CPT | Performed by: NURSE PRACTITIONER

## 2022-02-22 PROCEDURE — 3008F BODY MASS INDEX DOCD: CPT | Performed by: NURSE PRACTITIONER

## 2022-02-22 PROCEDURE — 1036F TOBACCO NON-USER: CPT | Performed by: NURSE PRACTITIONER

## 2022-02-22 NOTE — PROGRESS NOTES
Virtual Regular Visit    Verification of patient location:    Patient is located in the following state in which I hold an active license [place]      Assessment/Plan:    Problem List Items Addressed This Visit        Endocrine    Hypothyroidism     Currently stable will continue level thyroxine 75 mcg tablet daily  Other    Palpitations - Primary      Recommended repeat Holter monitor, advised to get echocardiogram that was previously ordered  Patient should follow-up with Cardiology  Relevant Orders    Holter monitor    Tinnitus of both ears      Patient advised to follow-up with ENT         Cough       Continue with Flonase add on antihistamine  Reason for visit is   Chief Complaint   Patient presents with    Follow-up     holtor results continues with chest fluttering and ringing in his ears        Annual physical         Encounter provider KAYLA Garcia    Provider located at [address] [place] [id number]      Recent Visits  Date Type Provider Dept   02/21/22 Telephone KAYLA Garcia  [facility]   Showing recent visits within past 7 days and meeting all other requirements  Today's Visits  Date Type Provider Dept   02/22/22 [address] [place], [place] [address]KAYLA Pg [facility]   Showing today's visits and meeting all other requirements  Future Appointments  No visits were found meeting these conditions  Showing future appointments within next 150 days and meeting all other requirements       The patient was identified by name and date of birth  Nobie Apo was informed that this is a telemedicine visit and that the visit is being conducted through "[employer] and patient was informed that this is not a secure, HIPAA-compliant platform  He agrees to proceed     My office door was closed  No one else was in the room  He acknowledged consent and understanding of privacy and security of the video platform  The patient has agreed to participate and understands they can discontinue the visit at any time  Patient is aware this is a billable service  Maximiliano Darby is a 27 y o  male    Patient presents today to review Holter monitor results  It appears that Holtor monitor could not be read due to poor quality  Patient reports ongoing chest fluttering, we reviewed TSH which is within normal limits  Patient was recently started on albuterol however fluttering has remained the same regardless of using albuterol  Patient also reports associated cough with chest fluttering  Patient denies symptoms of GERD he does report that he has history of seasonal allergies and has been using Flonase however does not use an antihistamine  Denies assiociated chest pain or SOB  He also reports on going tinnitus  Note from last office visit:  Patient presents today to review blood work  Patient had been evaluated by ENT for tinnitus which has been improving  Note from last office visit:  Patient presents today for tinnitus and hypertension  Patient currently lives in the [place] however was visiting [facility] for a wedding  Patient had developed tinnitus approximately 3 weeks ago, which had gotten worse when he was in [facility] at that time he was evaluated by 2 separate ENTs and was told he was developing Meniere's disease and had a deviated septum  He was given medications in [facility] which were helpful but he continues to have episodes of dizziness and ringing in his ears  He does report decreased hearing bilaterally  He reports when he gets the episodes of dizziness that the room spins around him and developed associated nausea with these episodes      Hypertension-patient was started on medication in [facility], and blood pressure seems to be well controlled at this time on this medication, denies CP, SOB and palpations  Hypothyroidism-reports compliance with levothyroxine he takes his medication patient denies any recent TSH, had blood work done today      He reports that he is super thirsty        Past Medical History:   Diagnosis Date    GERD (gastroesophageal reflux disease)     Headache(784 0) 12/27/2021    Headachs    Hyperlipidemia     Hypertension     Hypothyroid     Irregular heart beat     Varicose vein of leg        Past Surgical History:   Procedure Laterality Date    PILONIDAL CYST EXCISION         Current Outpatient Medications   Medication Sig Dispense Refill    albuterol (PROVENTIL HFA,VENTOLIN HFA) 90 mcg/act inhaler Inhale 2 puffs every 4 (four) hours as needed for wheezing 18 g 0    ergocalciferol (VITAMIN D2) 50,000 units Take 1 capsule (50,000 Units total) by mouth 2 (two) times a week 24 capsule 0    fluticasone (FLONASE) 50 mcg/act nasal spray 1 spray into each nostril daily 16 g 5    levothyroxine 75 mcg tablet Take 1 tablet (75 mcg total) by mouth daily 90 tablet 1    Multiple Vitamins-Minerals (MENS MULTIPLUS PO) Take by mouth in the morning         No current facility-administered medications for this visit  Allergies   Allergen Reactions    Penicillins Hives       Review of Systems   Constitutional: Negative for activity change, appetite change, chills, diaphoresis and fever  HENT: Positive for tinnitus  Negative for congestion, ear discharge, ear pain, postnasal drip, rhinorrhea, sinus pressure, sinus pain and sore throat  Eyes: Negative for pain, discharge, itching and visual disturbance  Respiratory: Negative for cough, chest tightness, shortness of breath and wheezing  Cardiovascular: Positive for palpitations  Negative for chest pain and leg swelling  Gastrointestinal: Negative for abdominal pain, constipation, diarrhea, nausea and vomiting  Endocrine: Negative for polydipsia, polyphagia and polyuria  Genitourinary: Negative for difficulty urinating, dysuria and urgency  Musculoskeletal: Negative for arthralgias, back pain and neck pain  Skin: Negative for rash and wound  Neurological: Negative for dizziness, weakness, numbness and headaches  Video Exam    Vitals:    02/22/22 0827   Weight: 126 kg (277 lb)   Height: 6' 1" (1 854 m)       Physical Exam  Neurological:      Mental Status: He is alert and oriented to person, place, and time  I spent 15 minutes directly with the patient during this visit    [address] verbally agrees to participate in [employer]  Pt is aware that [employer] could be limited without vital signs or the ability to perform a full hands-on physical exam  GriffinDIANA Anderson Tatianna understands he or the provider may request at any time to terminate the video visit and request the patient to seek care or treatment in person

## 2022-02-22 NOTE — ASSESSMENT & PLAN NOTE
Recommended repeat Holter monitor, advised to get echocardiogram that was previously ordered  Patient should follow-up with Cardiology

## 2022-02-25 PROCEDURE — 93227 XTRNL ECG REC<48 HR R&I: CPT | Performed by: INTERNAL MEDICINE

## 2022-03-06 ENCOUNTER — APPOINTMENT (OUTPATIENT)
Dept: LAB | Age: 32
End: 2022-03-06
Payer: COMMERCIAL

## 2022-03-06 DIAGNOSIS — R63.1 POLYDIPSIA: ICD-10-CM

## 2022-03-06 LAB
ALBUMIN SERPL BCP-MCNC: 4.4 G/DL (ref 3.5–5)
ALP SERPL-CCNC: 109 U/L (ref 46–116)
ALT SERPL W P-5'-P-CCNC: 89 U/L (ref 12–78)
ANION GAP SERPL CALCULATED.3IONS-SCNC: 4 MMOL/L (ref 4–13)
AST SERPL W P-5'-P-CCNC: 32 U/L (ref 5–45)
BASOPHILS # BLD AUTO: 0.03 THOUSANDS/ΜL (ref 0–0.1)
BASOPHILS NFR BLD AUTO: 1 % (ref 0–1)
BILIRUB SERPL-MCNC: 1.26 MG/DL (ref 0.2–1)
BUN SERPL-MCNC: 11 MG/DL (ref 5–25)
CALCIUM SERPL-MCNC: 10.4 MG/DL (ref 8.3–10.1)
CHLORIDE SERPL-SCNC: 105 MMOL/L (ref 100–108)
CO2 SERPL-SCNC: 28 MMOL/L (ref 21–32)
CREAT SERPL-MCNC: 1.27 MG/DL (ref 0.6–1.3)
EOSINOPHIL # BLD AUTO: 0.09 THOUSAND/ΜL (ref 0–0.61)
EOSINOPHIL NFR BLD AUTO: 2 % (ref 0–6)
ERYTHROCYTE [DISTWIDTH] IN BLOOD BY AUTOMATED COUNT: 12.2 % (ref 11.6–15.1)
EST. AVERAGE GLUCOSE BLD GHB EST-MCNC: 105 MG/DL
GFR SERPL CREATININE-BSD FRML MDRD: 74 ML/MIN/1.73SQ M
GLUCOSE P FAST SERPL-MCNC: 116 MG/DL (ref 65–99)
HBA1C MFR BLD: 5.3 %
HCT VFR BLD AUTO: 50.8 % (ref 36.5–49.3)
HGB BLD-MCNC: 17.3 G/DL (ref 12–17)
IMM GRANULOCYTES # BLD AUTO: 0.01 THOUSAND/UL (ref 0–0.2)
IMM GRANULOCYTES NFR BLD AUTO: 0 % (ref 0–2)
LYMPHOCYTES # BLD AUTO: 1.89 THOUSANDS/ΜL (ref 0.6–4.47)
LYMPHOCYTES NFR BLD AUTO: 34 % (ref 14–44)
MCH RBC QN AUTO: 29.3 PG (ref 26.8–34.3)
MCHC RBC AUTO-ENTMCNC: 34.1 G/DL (ref 31.4–37.4)
MCV RBC AUTO: 86 FL (ref 82–98)
MONOCYTES # BLD AUTO: 0.57 THOUSAND/ΜL (ref 0.17–1.22)
MONOCYTES NFR BLD AUTO: 10 % (ref 4–12)
NEUTROPHILS # BLD AUTO: 2.97 THOUSANDS/ΜL (ref 1.85–7.62)
NEUTS SEG NFR BLD AUTO: 53 % (ref 43–75)
NRBC BLD AUTO-RTO: 0 /100 WBCS
PLATELET # BLD AUTO: 271 THOUSANDS/UL (ref 149–390)
PMV BLD AUTO: 9.1 FL (ref 8.9–12.7)
POTASSIUM SERPL-SCNC: 4.3 MMOL/L (ref 3.5–5.3)
PROT SERPL-MCNC: 8.1 G/DL (ref 6.4–8.2)
RBC # BLD AUTO: 5.9 MILLION/UL (ref 3.88–5.62)
SODIUM SERPL-SCNC: 137 MMOL/L (ref 136–145)
WBC # BLD AUTO: 5.56 THOUSAND/UL (ref 4.31–10.16)

## 2022-03-06 PROCEDURE — 85025 COMPLETE CBC W/AUTO DIFF WBC: CPT

## 2022-03-06 PROCEDURE — 83036 HEMOGLOBIN GLYCOSYLATED A1C: CPT | Performed by: NURSE PRACTITIONER

## 2022-03-06 PROCEDURE — 36415 COLL VENOUS BLD VENIPUNCTURE: CPT | Performed by: NURSE PRACTITIONER

## 2022-03-06 PROCEDURE — 80053 COMPREHEN METABOLIC PANEL: CPT

## 2022-03-28 ENCOUNTER — OFFICE VISIT (OUTPATIENT)
Dept: INTERNAL MEDICINE CLINIC | Facility: CLINIC | Age: 32
End: 2022-03-28
Payer: COMMERCIAL

## 2022-03-28 VITALS
DIASTOLIC BLOOD PRESSURE: 80 MMHG | BODY MASS INDEX: 35.07 KG/M2 | TEMPERATURE: 97.7 F | WEIGHT: 264.6 LBS | SYSTOLIC BLOOD PRESSURE: 120 MMHG | OXYGEN SATURATION: 98 % | HEART RATE: 90 BPM | HEIGHT: 73 IN

## 2022-03-28 DIAGNOSIS — R73.01 ELEVATED FASTING GLUCOSE: ICD-10-CM

## 2022-03-28 DIAGNOSIS — I10 HYPERTENSION, UNSPECIFIED TYPE: Primary | ICD-10-CM

## 2022-03-28 DIAGNOSIS — E83.52 HYPERCALCEMIA: ICD-10-CM

## 2022-03-28 DIAGNOSIS — D58.2 ELEVATED HEMOGLOBIN (HCC): ICD-10-CM

## 2022-03-28 DIAGNOSIS — G47.00 INSOMNIA, UNSPECIFIED TYPE: ICD-10-CM

## 2022-03-28 DIAGNOSIS — H93.13 TINNITUS OF BOTH EARS: ICD-10-CM

## 2022-03-28 DIAGNOSIS — R79.89 ELEVATED SERUM CREATININE: ICD-10-CM

## 2022-03-28 DIAGNOSIS — R00.2 PALPITATIONS: ICD-10-CM

## 2022-03-28 DIAGNOSIS — E03.9 ACQUIRED HYPOTHYROIDISM: ICD-10-CM

## 2022-03-28 DIAGNOSIS — R06.83 SNORING: ICD-10-CM

## 2022-03-28 DIAGNOSIS — E55.9 VITAMIN D DEFICIENCY: ICD-10-CM

## 2022-03-28 PROCEDURE — 3008F BODY MASS INDEX DOCD: CPT | Performed by: NURSE PRACTITIONER

## 2022-03-28 PROCEDURE — 99214 OFFICE O/P EST MOD 30 MIN: CPT | Performed by: NURSE PRACTITIONER

## 2022-03-28 PROCEDURE — 1036F TOBACCO NON-USER: CPT | Performed by: NURSE PRACTITIONER

## 2022-03-28 RX ORDER — ESZOPICLONE 1 MG/1
1 TABLET, FILM COATED ORAL
Qty: 30 TABLET | Refills: 1 | Status: SHIPPED | OUTPATIENT
Start: 2022-03-28

## 2022-03-28 RX ORDER — LEVOTHYROXINE SODIUM 0.07 MG/1
75 TABLET ORAL DAILY
Qty: 90 TABLET | Refills: 1 | Status: SHIPPED | OUTPATIENT
Start: 2022-03-28

## 2022-03-28 RX ORDER — ERGOCALCIFEROL 1.25 MG/1
50000 CAPSULE ORAL 2 TIMES WEEKLY
Qty: 24 CAPSULE | Refills: 0 | Status: SHIPPED | OUTPATIENT
Start: 2022-03-28

## 2022-03-28 NOTE — PROGRESS NOTES
Assessment/Plan:    Insomnia  Will get sleep study  Will start on Lunesta and follow-up in 1 month  Hypercalcemia  Will get repeat blood work  If calcium remains elevated will get PTH  Patient has not yet started supplement for vitamin-D deficiency  Vitamin D deficiency  Will start vitamin-D supplementation    Elevated hemoglobin (HCC)  Recently was an area of high altitude, will get repeat CBC  Continuing to trend down    Elevated serum creatinine  Continue with increased water intake continue to trend down  Palpitations   Recommended repeat Holter monitor, advised to get echocardiogram that was previously ordered  Patient should follow-up with Cardiology  Tinnitus of both ears   Patient advised to follow-up with ENT              Diagnoses and all orders for this visit:    Hypertension, unspecified type  -     Comprehensive metabolic panel; Future  -     CBC and differential  -     Lipid panel    Acquired hypothyroidism  -     levothyroxine 75 mcg tablet; Take 1 tablet (75 mcg total) by mouth daily    Vitamin D deficiency  -     ergocalciferol (VITAMIN D2) 50,000 units; Take 1 capsule (50,000 Units total) by mouth 2 (two) times a week    Elevated fasting glucose  -     Hemoglobin A1C    Insomnia, unspecified type  -     eszopiclone (LUNESTA) 1 mg tablet; Take 1 tablet (1 mg total) by mouth daily at bedtime as needed for sleep Take immediately before bedtime  -     Home Study; Future    Snoring  -     Home Study; Future    BMI 35 0-35 9,adult    Hypercalcemia    Elevated hemoglobin (HCC)    Elevated serum creatinine    Palpitations    Tinnitus of both ears          Subjective:      Patient ID: Beti Pedro is a 27 y o  male  Patient presents today to review Holter monitor results  He had previously had a Holter monitor done however it could not be read due to poor quality        Holter monitor reveals the underlying rhythm is sinus rhythm with an average heart rate of 91 and a minimum heart rate of 45 and maximum heart rate of 171  There are rare ventricular ectopy comprised of rare VPCs and a single ventricular couplet  There are rare supraventricular ectopy  There is no evidence of significant bradyarrhythmia or advanced heart block  Patient reports ongoing chest fluttering, we reviewed TSH which is within normal limits  Patient was recently started on albuterol however fluttering has remained the same regardless of using albuterol  Patient also reports associated cough with chest fluttering  Patient denies symptoms of GERD he does report that he has history of seasonal allergies and has been using Flonase however does not use an antihistamine  Denies assiociated chest pain or SOB  He also reports on going tinnitus  Note from last office visit:  Patient presents today to review blood work  Patient had been evaluated by ENT for tinnitus which has been improving  Note from last office visit:  Patient presents today for tinnitus and hypertension  Patient currently lives in the [place] however was visiting [facility] for a wedding  Patient had developed tinnitus approximately 3 weeks ago, which had gotten worse when he was in [facility] at that time he was evaluated by 2 separate ENTs and was told he was developing Meniere's disease and had a deviated septum  He was given medications in [facility] which were helpful but he continues to have episodes of dizziness and ringing in his ears  He does report decreased hearing bilaterally  He reports when he gets the episodes of dizziness that the room spins around him and developed associated nausea with these episodes  Hypertension-patient was started on medication in [facility], and blood pressure seems to be well controlled at this time on this medication, denies CP, SOB and palpations       Hypothyroidism-reports compliance with levothyroxine he takes his medication patient denies any recent TSH, had blood work done today      He reports that he is super thirsty       The following portions of the patient's history were reviewed and updated as appropriate: allergies, current medications, past family history, past medical history, past social history, past surgical history and problem list     Review of Systems   Constitutional: Negative for activity change, appetite change, chills, diaphoresis and fever  HENT: Negative for congestion, ear discharge, ear pain, postnasal drip, rhinorrhea, sinus pressure, sinus pain and sore throat  Eyes: Negative for pain, discharge, itching and visual disturbance  Respiratory: Negative for cough, chest tightness, shortness of breath and wheezing  Cardiovascular: Negative for chest pain, palpitations and leg swelling  Gastrointestinal: Negative for abdominal pain, constipation, diarrhea, nausea and vomiting  Endocrine: Negative for polydipsia, polyphagia and polyuria  Genitourinary: Negative for difficulty urinating, dysuria and urgency  Musculoskeletal: Negative for arthralgias, back pain and neck pain  Skin: Negative for rash and wound  Neurological: Negative for dizziness, weakness, numbness and headaches  Psychiatric/Behavioral: Positive for sleep disturbance           Past Medical History:   Diagnosis Date    GERD (gastroesophageal reflux disease)     Headache(784 0) 12/27/2021    Headachs    Hyperlipidemia     Hypertension     Hypothyroid     Irregular heart beat     Varicose vein of leg          Current Outpatient Medications:     albuterol (PROVENTIL HFA,VENTOLIN HFA) 90 mcg/act inhaler, Inhale 2 puffs every 4 (four) hours as needed for wheezing, Disp: 18 g, Rfl: 0    azelastine (ASTELIN) 0 1 % nasal spray, 2 sprays into each nostril 2 (two) times a day Use in each nostril as directed, Disp: 30 mL, Rfl: 11    fluticasone (FLONASE) 50 mcg/act nasal spray, 1 spray into each nostril daily, Disp: 16 g, Rfl: 5    levothyroxine 75 mcg tablet, Take 1 tablet (75 mcg total) by mouth daily, Disp: 90 tablet, Rfl: 1    Multiple Vitamins-Minerals (MENS MULTIPLUS PO), Take by mouth in the morning  , Disp: , Rfl:     ergocalciferol (VITAMIN D2) 50,000 units, Take 1 capsule (50,000 Units total) by mouth 2 (two) times a week, Disp: 24 capsule, Rfl: 0    eszopiclone (LUNESTA) 1 mg tablet, Take 1 tablet (1 mg total) by mouth daily at bedtime as needed for sleep Take immediately before bedtime, Disp: 30 tablet, Rfl: 1    Allergies   Allergen Reactions    Penicillins Hives       Social History   Past Surgical History:   Procedure Laterality Date    PILONIDAL CYST EXCISION       Family History   Problem Relation Age of Onset    Hypothyroidism Mother     BARBARA disease Mother     Hypertension Father     Heart disease Maternal Grandfather     Heart disease Paternal Grandfather        Objective:  /80 (BP Location: Left arm, Patient Position: Sitting, Cuff Size: Large)   Pulse 90   Temp 97 7 °F (36 5 °C) (Tympanic)   Ht 6' 1 19" (1 859 m)   Wt 120 kg (264 lb 9 6 oz)   SpO2 98%   BMI 34 73 kg/m²     Recent Results (from the past 1344 hour(s))   Hemoglobin A1C    Collection Time: 03/06/22 10:16 AM   Result Value Ref Range    Hemoglobin A1C 5 3 Normal 3 8-5 6%; PreDiabetic 5 7-6 4%;  Diabetic >=6 5%; Glycemic control for adults with diabetes <7 0% %     mg/dl   CBC and differential    Collection Time: 03/06/22 10:16 AM   Result Value Ref Range    WBC 5 56 4 31 - 10 16 Thousand/uL    RBC 5 90 (H) 3 88 - 5 62 Million/uL    Hemoglobin 17 3 (H) 12 0 - 17 0 g/dL    Hematocrit 50 8 (H) 36 5 - 49 3 %    MCV 86 82 - 98 fL    MCH 29 3 26 8 - 34 3 pg    MCHC 34 1 31 4 - 37 4 g/dL    RDW 12 2 11 6 - 15 1 %    MPV 9 1 8 9 - 12 7 fL    Platelets 082 480 - 896 Thousands/uL    nRBC 0 /100 WBCs    Neutrophils Relative 53 43 - 75 %    Immat GRANS % 0 0 - 2 %    Lymphocytes Relative 34 14 - 44 %    Monocytes Relative 10 4 - 12 %    Eosinophils Relative 2 0 - 6 %    Basophils Relative 1 0 - 1 % Neutrophils Absolute 2 97 1 85 - 7 62 Thousands/µL    Immature Grans Absolute 0 01 0 00 - 0 20 Thousand/uL    Lymphocytes Absolute 1 89 0 60 - 4 47 Thousands/µL    Monocytes Absolute 0 57 0 17 - 1 22 Thousand/µL    Eosinophils Absolute 0 09 0 00 - 0 61 Thousand/µL    Basophils Absolute 0 03 0 00 - 0 10 Thousands/µL   Comprehensive metabolic panel    Collection Time: 03/06/22 10:16 AM   Result Value Ref Range    Sodium 137 136 - 145 mmol/L    Potassium 4 3 3 5 - 5 3 mmol/L    Chloride 105 100 - 108 mmol/L    CO2 28 21 - 32 mmol/L    ANION GAP 4 4 - 13 mmol/L    BUN 11 5 - 25 mg/dL    Creatinine 1 27 0 60 - 1 30 mg/dL    Glucose, Fasting 116 (H) 65 - 99 mg/dL    Calcium 10 4 (H) 8 3 - 10 1 mg/dL    AST 32 5 - 45 U/L    ALT 89 (H) 12 - 78 U/L    Alkaline Phosphatase 109 46 - 116 U/L    Total Protein 8 1 6 4 - 8 2 g/dL    Albumin 4 4 3 5 - 5 0 g/dL    Total Bilirubin 1 26 (H) 0 20 - 1 00 mg/dL    eGFR 74 ml/min/1 73sq m            Physical Exam  Constitutional:       General: He is not in acute distress  Appearance: He is well-developed  He is not diaphoretic  HENT:      Head: Normocephalic and atraumatic  Right Ear: External ear normal       Left Ear: External ear normal       Nose: Nose normal       Mouth/Throat:      Pharynx: No oropharyngeal exudate  Eyes:      General:         Right eye: No discharge  Left eye: No discharge  Conjunctiva/sclera: Conjunctivae normal       Pupils: Pupils are equal, round, and reactive to light  Neck:      Thyroid: No thyromegaly  Cardiovascular:      Rate and Rhythm: Normal rate and regular rhythm  Heart sounds: Normal heart sounds  No murmur heard  No friction rub  No gallop  Pulmonary:      Effort: Pulmonary effort is normal  No respiratory distress  Breath sounds: Normal breath sounds  No stridor  No wheezing or rales  Abdominal:      General: Bowel sounds are normal  There is no distension  Palpations: Abdomen is soft  Tenderness: There is no abdominal tenderness  Musculoskeletal:      Cervical back: Normal range of motion and neck supple  Lymphadenopathy:      Cervical: No cervical adenopathy  Skin:     General: Skin is warm and dry  Findings: No erythema or rash  Neurological:      Mental Status: He is alert and oriented to person, place, and time  Psychiatric:         Behavior: Behavior normal          Thought Content:  Thought content normal          Judgment: Judgment normal

## 2022-03-28 NOTE — ASSESSMENT & PLAN NOTE
Will get repeat blood work  If calcium remains elevated will get PTH  Patient has not yet started supplement for vitamin-D deficiency

## 2022-04-11 ENCOUNTER — APPOINTMENT (OUTPATIENT)
Dept: LAB | Age: 32
End: 2022-04-11
Payer: COMMERCIAL

## 2022-04-11 DIAGNOSIS — I10 HYPERTENSION, UNSPECIFIED TYPE: ICD-10-CM

## 2022-04-11 LAB
ALBUMIN SERPL BCP-MCNC: 4 G/DL (ref 3.5–5)
ALP SERPL-CCNC: 101 U/L (ref 46–116)
ALT SERPL W P-5'-P-CCNC: 50 U/L (ref 12–78)
ANION GAP SERPL CALCULATED.3IONS-SCNC: 3 MMOL/L (ref 4–13)
AST SERPL W P-5'-P-CCNC: 19 U/L (ref 5–45)
BASOPHILS # BLD AUTO: 0.03 THOUSANDS/ΜL (ref 0–0.1)
BASOPHILS NFR BLD AUTO: 1 % (ref 0–1)
BILIRUB SERPL-MCNC: 0.98 MG/DL (ref 0.2–1)
BUN SERPL-MCNC: 9 MG/DL (ref 5–25)
CALCIUM SERPL-MCNC: 9.8 MG/DL (ref 8.3–10.1)
CHLORIDE SERPL-SCNC: 106 MMOL/L (ref 100–108)
CHOLEST SERPL-MCNC: 177 MG/DL
CO2 SERPL-SCNC: 30 MMOL/L (ref 21–32)
CREAT SERPL-MCNC: 1.16 MG/DL (ref 0.6–1.3)
EOSINOPHIL # BLD AUTO: 0.26 THOUSAND/ΜL (ref 0–0.61)
EOSINOPHIL NFR BLD AUTO: 4 % (ref 0–6)
ERYTHROCYTE [DISTWIDTH] IN BLOOD BY AUTOMATED COUNT: 12.2 % (ref 11.6–15.1)
EST. AVERAGE GLUCOSE BLD GHB EST-MCNC: 105 MG/DL
GFR SERPL CREATININE-BSD FRML MDRD: 82 ML/MIN/1.73SQ M
GLUCOSE P FAST SERPL-MCNC: 99 MG/DL (ref 65–99)
HBA1C MFR BLD: 5.3 %
HCT VFR BLD AUTO: 50.4 % (ref 36.5–49.3)
HDLC SERPL-MCNC: 38 MG/DL
HGB BLD-MCNC: 17.3 G/DL (ref 12–17)
IMM GRANULOCYTES # BLD AUTO: 0.01 THOUSAND/UL (ref 0–0.2)
IMM GRANULOCYTES NFR BLD AUTO: 0 % (ref 0–2)
LDLC SERPL CALC-MCNC: 98 MG/DL (ref 0–100)
LYMPHOCYTES # BLD AUTO: 2.52 THOUSANDS/ΜL (ref 0.6–4.47)
LYMPHOCYTES NFR BLD AUTO: 43 % (ref 14–44)
MCH RBC QN AUTO: 29 PG (ref 26.8–34.3)
MCHC RBC AUTO-ENTMCNC: 34.3 G/DL (ref 31.4–37.4)
MCV RBC AUTO: 84 FL (ref 82–98)
MONOCYTES # BLD AUTO: 0.57 THOUSAND/ΜL (ref 0.17–1.22)
MONOCYTES NFR BLD AUTO: 10 % (ref 4–12)
NEUTROPHILS # BLD AUTO: 2.54 THOUSANDS/ΜL (ref 1.85–7.62)
NEUTS SEG NFR BLD AUTO: 42 % (ref 43–75)
NONHDLC SERPL-MCNC: 139 MG/DL
NRBC BLD AUTO-RTO: 0 /100 WBCS
PLATELET # BLD AUTO: 272 THOUSANDS/UL (ref 149–390)
PMV BLD AUTO: 9.6 FL (ref 8.9–12.7)
POTASSIUM SERPL-SCNC: 3.8 MMOL/L (ref 3.5–5.3)
PROT SERPL-MCNC: 7.4 G/DL (ref 6.4–8.2)
RBC # BLD AUTO: 5.97 MILLION/UL (ref 3.88–5.62)
SODIUM SERPL-SCNC: 139 MMOL/L (ref 136–145)
TRIGL SERPL-MCNC: 203 MG/DL
WBC # BLD AUTO: 5.93 THOUSAND/UL (ref 4.31–10.16)

## 2022-04-11 PROCEDURE — 80061 LIPID PANEL: CPT | Performed by: NURSE PRACTITIONER

## 2022-04-11 PROCEDURE — 85025 COMPLETE CBC W/AUTO DIFF WBC: CPT | Performed by: NURSE PRACTITIONER

## 2022-04-11 PROCEDURE — 83036 HEMOGLOBIN GLYCOSYLATED A1C: CPT | Performed by: NURSE PRACTITIONER

## 2022-04-11 PROCEDURE — 36415 COLL VENOUS BLD VENIPUNCTURE: CPT | Performed by: NURSE PRACTITIONER

## 2022-04-11 PROCEDURE — 80053 COMPREHEN METABOLIC PANEL: CPT

## 2022-04-22 ENCOUNTER — TELEPHONE (OUTPATIENT)
Dept: SLEEP CENTER | Facility: CLINIC | Age: 32
End: 2022-04-22

## 2022-04-22 NOTE — TELEPHONE ENCOUNTER
----- Message from Brielle Clark MD sent at 4/21/2022  8:41 PM EDT -----  approved  ----- Message -----  From: Jun Grimes  Sent: 4/21/2022  12:42 PM EDT  To: Sleep Medicine [facility] Provider    This sleep study needs approval      If approved please sign and return to clerical pool  If denied please include reasons why  Also provide alternative testing if warranted  Please sign and return to clerical pool

## 2022-10-07 ENCOUNTER — TELEPHONE (OUTPATIENT)
Dept: INTERNAL MEDICINE CLINIC | Facility: OTHER | Age: 32
End: 2022-10-07

## 2022-10-11 PROBLEM — R05.9 COUGH: Status: RESOLVED | Noted: 2022-02-22 | Resolved: 2022-10-11

## 2022-10-13 ENCOUNTER — APPOINTMENT (OUTPATIENT)
Dept: LAB | Facility: HOSPITAL | Age: 32
End: 2022-10-13
Attending: STUDENT IN AN ORGANIZED HEALTH CARE EDUCATION/TRAINING PROGRAM
Payer: COMMERCIAL

## 2022-10-13 ENCOUNTER — OFFICE VISIT (OUTPATIENT)
Dept: FAMILY MEDICINE CLINIC | Facility: CLINIC | Age: 32
End: 2022-10-13
Payer: COMMERCIAL

## 2022-10-13 VITALS
RESPIRATION RATE: 16 BRPM | HEIGHT: 73 IN | DIASTOLIC BLOOD PRESSURE: 84 MMHG | OXYGEN SATURATION: 99 % | SYSTOLIC BLOOD PRESSURE: 120 MMHG | WEIGHT: 269 LBS | BODY MASS INDEX: 35.65 KG/M2 | HEART RATE: 62 BPM | TEMPERATURE: 97.5 F

## 2022-10-13 DIAGNOSIS — E55.9 VITAMIN D DEFICIENCY: ICD-10-CM

## 2022-10-13 DIAGNOSIS — E78.2 MIXED HYPERLIPIDEMIA: ICD-10-CM

## 2022-10-13 DIAGNOSIS — G89.29 CHRONIC NONINTRACTABLE HEADACHE, UNSPECIFIED HEADACHE TYPE: ICD-10-CM

## 2022-10-13 DIAGNOSIS — R03.0 ELEVATED BP WITHOUT DIAGNOSIS OF HYPERTENSION: ICD-10-CM

## 2022-10-13 DIAGNOSIS — K21.9 GASTROESOPHAGEAL REFLUX DISEASE WITHOUT ESOPHAGITIS: ICD-10-CM

## 2022-10-13 DIAGNOSIS — H93.13 TINNITUS OF BOTH EARS: Primary | ICD-10-CM

## 2022-10-13 DIAGNOSIS — E03.9 ACQUIRED HYPOTHYROIDISM: ICD-10-CM

## 2022-10-13 DIAGNOSIS — R42 DIZZINESS: ICD-10-CM

## 2022-10-13 DIAGNOSIS — R51.9 CHRONIC NONINTRACTABLE HEADACHE, UNSPECIFIED HEADACHE TYPE: ICD-10-CM

## 2022-10-13 PROBLEM — R63.1 POLYDIPSIA: Status: RESOLVED | Noted: 2022-02-02 | Resolved: 2022-10-13

## 2022-10-13 LAB
25(OH)D3 SERPL-MCNC: 25.1 NG/ML (ref 30–100)
ALBUMIN SERPL BCP-MCNC: 4.3 G/DL (ref 3.5–5)
ALP SERPL-CCNC: 88 U/L (ref 34–104)
ALT SERPL W P-5'-P-CCNC: 37 U/L (ref 7–52)
ANION GAP SERPL CALCULATED.3IONS-SCNC: 6 MMOL/L (ref 4–13)
AST SERPL W P-5'-P-CCNC: 22 U/L (ref 13–39)
BASOPHILS # BLD AUTO: 0.03 THOUSANDS/ΜL (ref 0–0.1)
BASOPHILS NFR BLD AUTO: 1 % (ref 0–1)
BILIRUB SERPL-MCNC: 0.99 MG/DL (ref 0.2–1)
BUN SERPL-MCNC: 10 MG/DL (ref 5–25)
CALCIUM SERPL-MCNC: 9.6 MG/DL (ref 8.4–10.2)
CHLORIDE SERPL-SCNC: 102 MMOL/L (ref 96–108)
CHOLEST SERPL-MCNC: 196 MG/DL
CO2 SERPL-SCNC: 28 MMOL/L (ref 21–32)
CREAT SERPL-MCNC: 1.24 MG/DL (ref 0.6–1.3)
EOSINOPHIL # BLD AUTO: 0.21 THOUSAND/ΜL (ref 0–0.61)
EOSINOPHIL NFR BLD AUTO: 4 % (ref 0–6)
ERYTHROCYTE [DISTWIDTH] IN BLOOD BY AUTOMATED COUNT: 11.9 % (ref 11.6–15.1)
FOLATE SERPL-MCNC: 12.5 NG/ML (ref 3.1–17.5)
GFR SERPL CREATININE-BSD FRML MDRD: 75 ML/MIN/1.73SQ M
GLUCOSE P FAST SERPL-MCNC: 83 MG/DL (ref 65–99)
HCT VFR BLD AUTO: 49.1 % (ref 36.5–49.3)
HDLC SERPL-MCNC: 38 MG/DL
HGB BLD-MCNC: 17.2 G/DL (ref 12–17)
IMM GRANULOCYTES # BLD AUTO: 0.01 THOUSAND/UL (ref 0–0.2)
IMM GRANULOCYTES NFR BLD AUTO: 0 % (ref 0–2)
LDLC SERPL CALC-MCNC: 121 MG/DL (ref 0–100)
LYMPHOCYTES # BLD AUTO: 2.37 THOUSANDS/ΜL (ref 0.6–4.47)
LYMPHOCYTES NFR BLD AUTO: 40 % (ref 14–44)
MCH RBC QN AUTO: 30 PG (ref 26.8–34.3)
MCHC RBC AUTO-ENTMCNC: 35 G/DL (ref 31.4–37.4)
MCV RBC AUTO: 86 FL (ref 82–98)
MONOCYTES # BLD AUTO: 0.49 THOUSAND/ΜL (ref 0.17–1.22)
MONOCYTES NFR BLD AUTO: 8 % (ref 4–12)
NEUTROPHILS # BLD AUTO: 2.78 THOUSANDS/ΜL (ref 1.85–7.62)
NEUTS SEG NFR BLD AUTO: 47 % (ref 43–75)
NONHDLC SERPL-MCNC: 158 MG/DL
NRBC BLD AUTO-RTO: 0 /100 WBCS
PLATELET # BLD AUTO: 261 THOUSANDS/UL (ref 149–390)
PMV BLD AUTO: 9.1 FL (ref 8.9–12.7)
POTASSIUM SERPL-SCNC: 4.1 MMOL/L (ref 3.5–5.3)
PROT SERPL-MCNC: 7.4 G/DL (ref 6.4–8.4)
RBC # BLD AUTO: 5.73 MILLION/UL (ref 3.88–5.62)
SODIUM SERPL-SCNC: 136 MMOL/L (ref 135–147)
TRIGL SERPL-MCNC: 186 MG/DL
TSH SERPL DL<=0.05 MIU/L-ACNC: 3.81 UIU/ML (ref 0.45–4.5)
VIT B12 SERPL-MCNC: 282 PG/ML (ref 100–900)
WBC # BLD AUTO: 5.89 THOUSAND/UL (ref 4.31–10.16)

## 2022-10-13 PROCEDURE — 82746 ASSAY OF FOLIC ACID SERUM: CPT

## 2022-10-13 PROCEDURE — 99214 OFFICE O/P EST MOD 30 MIN: CPT | Performed by: FAMILY MEDICINE

## 2022-10-13 PROCEDURE — 82607 VITAMIN B-12: CPT

## 2022-10-13 PROCEDURE — 82306 VITAMIN D 25 HYDROXY: CPT

## 2022-10-13 PROCEDURE — 84443 ASSAY THYROID STIM HORMONE: CPT

## 2022-10-13 PROCEDURE — 85025 COMPLETE CBC W/AUTO DIFF WBC: CPT

## 2022-10-13 PROCEDURE — 36415 COLL VENOUS BLD VENIPUNCTURE: CPT

## 2022-10-13 PROCEDURE — 80053 COMPREHEN METABOLIC PANEL: CPT

## 2022-10-13 PROCEDURE — 86618 LYME DISEASE ANTIBODY: CPT

## 2022-10-13 PROCEDURE — 80061 LIPID PANEL: CPT

## 2022-10-13 RX ORDER — NAPROXEN 500 MG/1
500 TABLET ORAL 2 TIMES DAILY WITH MEALS
Qty: 30 TABLET | Refills: 0 | Status: SHIPPED | OUTPATIENT
Start: 2022-10-13

## 2022-10-13 NOTE — ASSESSMENT & PLAN NOTE
Recheck lipids  Not currently on med  Advised pt to follow a low cholesterol diet and to exercise on a regular basis

## 2022-10-13 NOTE — PROGRESS NOTES
Depression Screening and Follow-up Plan: Patient was screened for depression during today's encounter  They screened negative with a PHQ-2 score of 0  Assessment/Plan:         Problem List Items Addressed This Visit        Digestive    Gastroesophageal reflux disease without esophagitis     Has occasional sxs  Not on med  Continue GERD diet  Endocrine    Hypothyroidism     Recheck TSH and Free T4  Continue levothyroxine 75 mcg qd  Other    Vitamin D deficiency     Was 16 8 in Jan 2022  Was treated with Vit D 07266 U 2 times per week  Recheck level  Tinnitus of both ears - Primary     Pt has had it since Dec 2022  Has seen ENT recently and for CT of head and labs  If normal, T/C MRI of brain and referral to Neurology  Relevant Orders    Ambulatory Referral to Neurology    Mixed hyperlipidemia     Recheck lipids  Not currently on med  Advised pt to follow a low cholesterol diet and to exercise on a regular basis  Relevant Orders    Lipid panel    Elevated BP without diagnosis of hypertension     BP ok today  Pt advised to continue low Na diet and to exercise on a regular basis  Chronic nonintractable headache     Pt has been getting headaches for past year  Started a few weeks before his tinnitus started  Pt also gets brain fog at times  Will refer to Neuro  May be due to previous COVID infection  Will try naproxen 500 mg bid for 2 weeks  Relevant Medications    naproxen (Naprosyn) 500 mg tablet    Other Relevant Orders    Ambulatory Referral to Neurology            Subjective:      Patient ID: Rafael Arredondo is a 34 y o  male  Pt here for new pt visit  Has history of borderline HTN but never on med for it  Pt also has hx of hypothyroidism and on med for it  Gets GERD at times, Stopped omeprazole 1 yr ago and doing ok  Was on Vit D replacement but finished it  Pt has had ringing in right ear for past 10 months  No hx of noise exposure   Pt has seen ENT  Pt also had vertigo last week and was seen in ER  Doing better now  Pt also gets headaches  The following portions of the patient's history were reviewed and updated as appropriate:   Past Medical History:  He has a past medical history of GERD (gastroesophageal reflux disease), Headache(784 0) (12/27/2021), Hyperlipidemia, Hypertension, Hypothyroid, Irregular heart beat, Thyroid disorder, and Varicose vein of leg ,  _______________________________________________________________________  Medical Problems:  does not have any pertinent problems on file ,  _______________________________________________________________________  Past Surgical History:   has a past surgical history that includes PILONIDAL CYST EXCISION and [place] tooth extraction  ,  _______________________________________________________________________  Family History:  family history includes Arthritis in his family; BARBARA disease in his mother; Heart disease in his family, maternal grandfather, and paternal grandfather; Hypertension in his family and father; Hypothyroidism in his mother ,  _______________________________________________________________________  Social History:   reports that he has never smoked  He has never used smokeless tobacco  He reports previous alcohol use  He reports previous drug use ,  _______________________________________________________________________  Allergies:  is allergic to penicillins     _______________________________________________________________________  Current Outpatient Medications   Medication Sig Dispense Refill   • albuterol (PROVENTIL HFA,VENTOLIN HFA) 90 mcg/act inhaler Inhale 2 puffs every 4 (four) hours as needed for wheezing 18 g 0   • levothyroxine 75 mcg tablet Take 1 tablet (75 mcg total) by mouth daily 90 tablet 1   • naproxen (Naprosyn) 500 mg tablet Take 1 tablet (500 mg total) by mouth 2 (two) times a day with meals 30 tablet 0     No current facility-administered medications for this visit      _______________________________________________________________________  Review of Systems   Constitutional: Negative for fatigue and unexpected weight change  HENT: Positive for tinnitus  Respiratory: Negative for cough and shortness of breath  Cardiovascular: Negative for chest pain  Gastrointestinal: Negative for abdominal pain, constipation, diarrhea and vomiting  Musculoskeletal: Negative for arthralgias  Neurological: Positive for dizziness and headaches  Psychiatric/Behavioral: Negative for dysphoric mood  The patient is not nervous/anxious  Objective:  Vitals:    10/13/22 1006 10/13/22 1045   BP: 130/90 120/84   BP Location: Left arm Left arm   Patient Position: Sitting Sitting   Cuff Size: Large Large   Pulse: 62    Resp: 16    Temp: 97 5 °F (36 4 °C)    TempSrc: Temporal    SpO2: 99%    Weight: 122 kg (269 lb)    Height: 6' 1" (1 854 m)      Body mass index is 35 49 kg/m²  Physical Exam  Vitals and nursing note reviewed  Constitutional:       Appearance: Normal appearance  He is well-developed  He is obese  HENT:      Right Ear: Tympanic membrane, ear canal and external ear normal       Left Ear: Tympanic membrane, ear canal and external ear normal       Nose: Nose normal       Mouth/Throat:      Mouth: Mucous membranes are moist       Pharynx: Oropharynx is clear  Neck:      Thyroid: No thyromegaly  Cardiovascular:      Rate and Rhythm: Normal rate and regular rhythm  Heart sounds: Normal heart sounds  No murmur heard  Pulmonary:      Effort: Pulmonary effort is normal  No respiratory distress  Breath sounds: Normal breath sounds  No wheezing  Musculoskeletal:      Cervical back: Normal range of motion and neck supple  Right lower leg: No edema  Left lower leg: No edema  Lymphadenopathy:      Cervical: No cervical adenopathy  Neurological:      Mental Status: He is alert and oriented to person, place, and time  Cranial Nerves: No cranial nerve deficit  Psychiatric:         Mood and Affect: Mood normal          Behavior: Behavior normal          Thought Content:  Thought content normal          Judgment: Judgment normal

## 2022-10-13 NOTE — ASSESSMENT & PLAN NOTE
Pt has had it since Dec 2022  Has seen ENT recently and for CT of head and labs  If normal, T/C MRI of brain and referral to Neurology

## 2022-10-13 NOTE — ASSESSMENT & PLAN NOTE
Pt has been getting headaches for past year  Started a few weeks before his tinnitus started  Pt also gets brain fog at times  Will refer to Neuro  May be due to previous COVID infection  Will try naproxen 500 mg bid for 2 weeks

## 2022-10-14 LAB — B BURGDOR IGG+IGM SER-ACNC: <0.2 AI

## 2022-10-18 ENCOUNTER — HOSPITAL ENCOUNTER (OUTPATIENT)
Dept: CT IMAGING | Facility: HOSPITAL | Age: 32
Discharge: HOME | End: 2022-10-18
Attending: STUDENT IN AN ORGANIZED HEALTH CARE EDUCATION/TRAINING PROGRAM
Payer: COMMERCIAL

## 2022-10-18 DIAGNOSIS — R42 DIZZINESS: ICD-10-CM

## 2022-10-18 PROCEDURE — 70480 CT ORBIT/EAR/FOSSA W/O DYE: CPT

## 2022-10-18 PROCEDURE — G1004 CDSM NDSC: HCPCS

## 2022-11-03 ENCOUNTER — OFFICE VISIT (OUTPATIENT)
Dept: FAMILY MEDICINE CLINIC | Facility: CLINIC | Age: 32
End: 2022-11-03

## 2022-11-03 VITALS
SYSTOLIC BLOOD PRESSURE: 120 MMHG | TEMPERATURE: 97.5 F | DIASTOLIC BLOOD PRESSURE: 80 MMHG | RESPIRATION RATE: 16 BRPM | BODY MASS INDEX: 35.78 KG/M2 | HEART RATE: 80 BPM | OXYGEN SATURATION: 99 % | WEIGHT: 270 LBS | HEIGHT: 73 IN

## 2022-11-03 DIAGNOSIS — H93.13 TINNITUS OF BOTH EARS: Primary | ICD-10-CM

## 2022-11-03 DIAGNOSIS — E55.9 VITAMIN D DEFICIENCY: ICD-10-CM

## 2022-11-03 DIAGNOSIS — I83.813 VARICOSE VEINS OF BOTH LOWER EXTREMITIES WITH PAIN: ICD-10-CM

## 2022-11-03 DIAGNOSIS — G89.29 CHRONIC NONINTRACTABLE HEADACHE, UNSPECIFIED HEADACHE TYPE: ICD-10-CM

## 2022-11-03 DIAGNOSIS — F41.1 GAD (GENERALIZED ANXIETY DISORDER): ICD-10-CM

## 2022-11-03 DIAGNOSIS — D75.1 POLYCYTHEMIA: ICD-10-CM

## 2022-11-03 DIAGNOSIS — E78.2 MIXED HYPERLIPIDEMIA: ICD-10-CM

## 2022-11-03 DIAGNOSIS — R51.9 CHRONIC NONINTRACTABLE HEADACHE, UNSPECIFIED HEADACHE TYPE: ICD-10-CM

## 2022-11-03 DIAGNOSIS — E03.9 ACQUIRED HYPOTHYROIDISM: ICD-10-CM

## 2022-11-03 PROBLEM — I83.93 VARICOSE VEINS OF BOTH LOWER EXTREMITIES: Status: ACTIVE | Noted: 2022-11-03

## 2022-11-03 RX ORDER — LEVOTHYROXINE SODIUM 0.07 MG/1
75 TABLET ORAL DAILY
Qty: 90 TABLET | Refills: 1 | Status: SHIPPED | OUTPATIENT
Start: 2022-11-03 | End: 2022-11-08 | Stop reason: SDUPTHER

## 2022-11-03 NOTE — PROGRESS NOTES
Depression Screening and Follow-up Plan: Patient was screened for depression during today's encounter  They screened negative with a PHQ-2 score of 2  Assessment/Plan:         Problem List Items Addressed This Visit        Endocrine    Hypothyroidism     TSH 3 81 in Oct 2022  Continue levothyroxine 75 mcg qd  Relevant Medications    levothyroxine 75 mcg tablet       Cardiovascular and Mediastinum    Varicose veins of both lower extremities     Will refer to vascular surgeon  Relevant Orders    Ambulatory Referral to Vascular Surgery       Other    Vitamin D deficiency     Level up to 25 1 in 2022  Pt advised to start 2000 U qd  Tinnitus of both ears - Primary     Pt has had it since Dec 2022  Has seen ENT recently and had labs and CT of temporal bones  Polycythemia     Will refer to Hematology  Pt advised to donate blood on a regular basis  Relevant Orders    Ambulatory Referral to Hematology / Oncology    Mixed hyperlipidemia     , HDL 38, and tgs 186 in Oct 2022  Advised pt to follow a low cholesterol diet and to exercise on a regular basis  GAGANDEEP (generalized anxiety disorder)     Pt has had increased anxiety and gets angry at times  Wants to see Psychiatry  Relevant Orders    Ambulatory Referral to Psychiatry    Chronic nonintractable headache     Pt still gets daily headaches  Will start naproxen 500 mg bid for 2 weeks for possible tension HAs  Will check MRI of brain and try to get pt in with Neurology  Relevant Orders    MRI brain w wo contrast            Subjective:      Patient ID: Al Gonzalez is a 34 y o  male  Pt here for f/u HA, Tinnitus, Hypothyroidism, HL, Vit D def  Still gets headaches at times at back of head  Didn't take the naproxen  Pt had CT of temporal bones and was normal  Pt also had labs done and were ok  Pt to see Neurology in April 2023  Pt also has been having increased anxiety and gets angry easily  The following portions of the patient's history were reviewed and updated as appropriate:   Past Medical History:  He has a past medical history of GERD (gastroesophageal reflux disease), Headache(784 0) (12/27/2021), Hyperlipidemia, Hypertension, Hypothyroid, Irregular heart beat, Thyroid disorder, and Varicose vein of leg ,  _______________________________________________________________________  Medical Problems:  does not have any pertinent problems on file ,  _______________________________________________________________________  Past Surgical History:   has a past surgical history that includes PILONIDAL CYST EXCISION and [place] tooth extraction  ,  _______________________________________________________________________  Family History:  family history includes Arthritis in his family; BARBARA disease in his mother; Heart disease in his family, maternal grandfather, and paternal grandfather; Hypertension in his family and father; Hypothyroidism in his mother ,  _______________________________________________________________________  Social History:   reports that he has never smoked  He has never used smokeless tobacco  He reports previous alcohol use  He reports previous drug use ,  _______________________________________________________________________  Allergies:  is allergic to penicillins     _______________________________________________________________________  Current Outpatient Medications   Medication Sig Dispense Refill   • levothyroxine 75 mcg tablet Take 1 tablet (75 mcg total) by mouth daily 90 tablet 1   • albuterol (PROVENTIL HFA,VENTOLIN HFA) 90 mcg/act inhaler Inhale 2 puffs every 4 (four) hours as needed for wheezing (Patient not taking: Reported on 11/3/2022) 18 g 0   • naproxen (Naprosyn) 500 mg tablet Take 1 tablet (500 mg total) by mouth 2 (two) times a day with meals (Patient not taking: Reported on 11/3/2022) 30 tablet 0     No current facility-administered medications for this visit      _______________________________________________________________________  Review of Systems   Constitutional: Negative for fatigue and unexpected weight change  HENT: Positive for tinnitus  Respiratory: Negative for cough and shortness of breath  Cardiovascular: Negative for chest pain  Gastrointestinal: Negative for abdominal pain, constipation, diarrhea and vomiting  Musculoskeletal: Negative for arthralgias  Pain in legs/varicose veins   Neurological: Positive for dizziness and headaches  Psychiatric/Behavioral: Negative for dysphoric mood  The patient is not nervous/anxious  Objective:  Vitals:    11/03/22 1116   BP: 120/80   BP Location: Left arm   Patient Position: Sitting   Cuff Size: Large   Pulse: 80   Resp: 16   Temp: 97 5 °F (36 4 °C)   TempSrc: Temporal   SpO2: 99%   Weight: 122 kg (270 lb)   Height: 6' 1" (1 854 m)     Body mass index is 35 62 kg/m²  Physical Exam  Vitals and nursing note reviewed  Constitutional:       Appearance: Normal appearance  He is well-developed  He is obese  HENT:      Right Ear: Tympanic membrane, ear canal and external ear normal       Left Ear: Tympanic membrane, ear canal and external ear normal       Nose: Nose normal       Mouth/Throat:      Mouth: Mucous membranes are moist       Pharynx: Oropharynx is clear  Neck:      Thyroid: No thyromegaly  Cardiovascular:      Rate and Rhythm: Normal rate and regular rhythm  Heart sounds: Normal heart sounds  No murmur heard  Pulmonary:      Effort: Pulmonary effort is normal  No respiratory distress  Breath sounds: Normal breath sounds  No wheezing  Musculoskeletal:      Cervical back: Normal range of motion and neck supple  Right lower leg: No edema  Left lower leg: No edema  Lymphadenopathy:      Cervical: No cervical adenopathy  Neurological:      Mental Status: He is alert and oriented to person, place, and time        Cranial Nerves: No cranial nerve deficit  Psychiatric:         Mood and Affect: Mood normal          Behavior: Behavior normal          Thought Content:  Thought content normal          Judgment: Judgment normal

## 2022-11-03 NOTE — ASSESSMENT & PLAN NOTE
Pt still gets daily headaches  Will start naproxen 500 mg bid for 2 weeks for possible tension HAs  Will check MRI of brain and try to get pt in with Neurology

## 2022-11-03 NOTE — ASSESSMENT & PLAN NOTE
, HDL 38, and tgs 186 in Oct 2022  Advised pt to follow a low cholesterol diet and to exercise on a regular basis

## 2022-11-07 ENCOUNTER — TELEPHONE (OUTPATIENT)
Dept: HEMATOLOGY ONCOLOGY | Facility: CLINIC | Age: 32
End: 2022-11-07

## 2022-11-07 ENCOUNTER — TELEPHONE (OUTPATIENT)
Dept: SLEEP CENTER | Facility: CLINIC | Age: 32
End: 2022-11-07

## 2022-11-07 NOTE — TELEPHONE ENCOUNTER
----- Message from Madeleine Borden MD sent at 11/5/2022  8:36 AM EDT -----  approved  ----- Message -----  From: Birdie Woodall  Sent: 11/3/2022   9:51 AM EDT  To: Sleep Medicine [facility] Provider    This Home sleep study needs approval      If approved please sign and return to clerical pool  If denied please include reasons why  Also provide alternative testing if warranted  Please sign and return to clerical pool

## 2022-11-08 ENCOUNTER — TELEPHONE (OUTPATIENT)
Dept: PSYCHIATRY | Facility: CLINIC | Age: 32
End: 2022-11-08

## 2022-11-08 ENCOUNTER — TELEPHONE (OUTPATIENT)
Dept: HEMATOLOGY ONCOLOGY | Facility: CLINIC | Age: 32
End: 2022-11-08

## 2022-11-08 DIAGNOSIS — E03.9 ACQUIRED HYPOTHYROIDISM: ICD-10-CM

## 2022-11-09 ENCOUNTER — TELEPHONE (OUTPATIENT)
Dept: HEMATOLOGY ONCOLOGY | Facility: CLINIC | Age: 32
End: 2022-11-09

## 2022-11-09 RX ORDER — LEVOTHYROXINE SODIUM 0.07 MG/1
75 TABLET ORAL DAILY
Qty: 90 TABLET | Refills: 0 | Status: SHIPPED | OUTPATIENT
Start: 2022-11-09

## 2022-11-09 NOTE — TELEPHONE ENCOUNTER
Made a 3rd attempt to schedule a new patient appointment with Hematology oncology and/or Surgical oncology a voicemail was left, the referral has been closed and a letter has been sent to the patient

## 2022-11-14 ENCOUNTER — TELEPHONE (OUTPATIENT)
Dept: PSYCHIATRY | Facility: CLINIC | Age: 32
End: 2022-11-14

## 2022-11-21 ENCOUNTER — HOSPITAL ENCOUNTER (OUTPATIENT)
Dept: MRI IMAGING | Facility: HOSPITAL | Age: 32
Discharge: HOME | End: 2022-11-21

## 2022-11-21 DIAGNOSIS — R51.9 CHRONIC NONINTRACTABLE HEADACHE, UNSPECIFIED HEADACHE TYPE: ICD-10-CM

## 2022-11-21 DIAGNOSIS — G89.29 CHRONIC NONINTRACTABLE HEADACHE, UNSPECIFIED HEADACHE TYPE: ICD-10-CM

## 2022-11-21 RX ADMIN — GADOBUTROL 12 ML: 604.72 INJECTION INTRAVENOUS at 15:40

## 2022-11-25 ENCOUNTER — TELEPHONE (OUTPATIENT)
Dept: FAMILY MEDICINE CLINIC | Facility: CLINIC | Age: 32
End: 2022-11-25

## 2022-11-28 ENCOUNTER — TELEPHONE (OUTPATIENT)
Dept: PSYCHIATRY | Facility: CLINIC | Age: 32
End: 2022-11-28

## 2022-11-28 ENCOUNTER — TELEPHONE (OUTPATIENT)
Dept: FAMILY MEDICINE CLINIC | Facility: CLINIC | Age: 32
End: 2022-11-28

## 2022-11-28 DIAGNOSIS — R51.9 INTRACTABLE EPISODIC HEADACHE, UNSPECIFIED HEADACHE TYPE: ICD-10-CM

## 2022-11-28 DIAGNOSIS — H93.13 TINNITUS OF BOTH EARS: Primary | ICD-10-CM

## 2022-11-28 NOTE — TELEPHONE ENCOUNTER
Patient has been added to the [facility] wait list  Confirmed insurance, needs of service, and location preferences

## 2022-12-10 ENCOUNTER — OFFICE VISIT (OUTPATIENT)
Dept: URGENT CARE | Age: 32
End: 2022-12-10

## 2022-12-10 VITALS
BODY MASS INDEX: 36.13 KG/M2 | WEIGHT: 272.6 LBS | HEIGHT: 73 IN | HEART RATE: 90 BPM | SYSTOLIC BLOOD PRESSURE: 124 MMHG | DIASTOLIC BLOOD PRESSURE: 82 MMHG | TEMPERATURE: 98.9 F | OXYGEN SATURATION: 99 % | RESPIRATION RATE: 20 BRPM

## 2022-12-10 DIAGNOSIS — R05.1 ACUTE COUGH: ICD-10-CM

## 2022-12-10 DIAGNOSIS — J11.1 INFLUENZA: ICD-10-CM

## 2022-12-10 DIAGNOSIS — R50.9 FEVER, UNSPECIFIED FEVER CAUSE: Primary | ICD-10-CM

## 2022-12-10 RX ORDER — OSELTAMIVIR PHOSPHATE 75 MG/1
75 CAPSULE ORAL EVERY 12 HOURS SCHEDULED
Qty: 10 CAPSULE | Refills: 0 | Status: SHIPPED | OUTPATIENT
Start: 2022-12-10 | End: 2022-12-15

## 2022-12-10 NOTE — LETTER
December 10, 2022     Patient: Frank Norton   YOB: 1990   Date of Visit: 12/10/2022       To Whom It May Concern:    Patient seen in office today for acute illness  No work until without fever for 24 hours without having to take anti fever medication           Sincerely,        Manolo Walton PA-C    CC: No Recipients

## 2022-12-10 NOTE — PROGRESS NOTES
[facility]    NAME: Saturnino Biggs is a 34 y o  male  : 1990    MRN: [id number]  DATE: December 10, 2022  TIME: 10:37 AM    Assessment and Plan   Fever, unspecified fever cause [R50 9]  1  Fever, unspecified fever cause  Covid/Flu-Office Collect      2  Acute cough  Covid/Flu-Office Collect      3  Influenza  oseltamivir (TAMIFLU) 75 mg capsule        Note given for work    Patient Instructions   Patient Instructions     Influenza   AMBULATORY CARE:   Influenza  (the flu) is an infection caused by the influenza virus  The flu is easily spread when an infected person coughs, sneezes, or has close contact with others  You may be able to spread the flu to others for 1 week or longer after signs or symptoms appear  Common signs and symptoms include the following:   · Fever and chills    · Headaches, body aches, and muscle or joint pain    · Cough, runny nose, and sore throat    · Loss of appetite, nausea, vomiting, or diarrhea    · Tiredness    · Trouble breathing    Call your local emergency number (911 in the [address] [place] [address],[address]) if:   · You have trouble breathing, and your lips look purple or blue  · You have a seizure  Seek care immediately if:   · You are dizzy, or you are urinating less or not at all  · You have a headache with a stiff neck, and you feel tired or confused  · You have new pain or pressure in your chest     · Your symptoms, such as shortness of breath, vomiting, or diarrhea, get worse  · Your symptoms, such as fever and coughing, seem to get better, but then get worse  Call your doctor if:   · You have new muscle pain or weakness  · You have questions or concerns about your condition or care  Treatment for influenza  may include any of the following:  · Acetaminophen  decreases pain and fever  It is available without a doctor's order  Ask how much to take and how often to take it  Follow directions   Read the labels of all other medicines you are using to see if they also contain acetaminophen, or ask your doctor or pharmacist  Acetaminophen can cause liver damage if not taken correctly  Do not use more than 4 grams (4,000 milligrams) total of acetaminophen in one day  · NSAIDs , such as ibuprofen, help decrease swelling, pain, and fever  This medicine is available with or without a doctor's order  NSAIDs can cause stomach bleeding or kidney problems in certain people  If you take blood thinner medicine, always ask your healthcare provider if NSAIDs are safe for you  Always read the medicine label and follow directions  · Antivirals  help fight a viral infection  Manage your symptoms:   · Rest  as much as you can to help you recover  · Drink liquids as directed  to help prevent dehydration  Ask how much liquid to drink each day and which liquids are best for you  Prevent the spread of germs:       1  Wash your hands often  Wash your hands several times each day  Wash after you use the bathroom, change a child's diaper, and before you prepare or eat food  Use soap and water every time  Rub your soapy hands together, lacing your fingers  Wash the front and back of your hands, and in between your fingers  Use the fingers of one hand to scrub under the fingernails of the other hand  Wash for at least 20 seconds  Rinse with warm, running water for several seconds  Then dry your hands with a clean towel or paper towel  Use hand  that contains alcohol if soap and water are not available  Do not touch your eyes, nose, or mouth without washing your hands first          2  Cover a sneeze or cough  Use a tissue that covers your mouth and nose  Throw the tissue away in a trash can right away  Use the bend of your arm if a tissue is not available  Wash your hands well with soap and water or use a hand   3  Stay away from others while you are sick  Avoid crowds as much as possible  4  Ask about vaccines you may need    Talk to your healthcare provider about your vaccine history  He or she will tell you which vaccines you need, and when to get them  ? Get the influenza (flu) vaccine as soon as it is available  Flu viruses change, so it is important to get a flu vaccine every year  ? Get the pneumonia vaccine if recommended  This vaccine is usually recommended every 5 years  Your provider will tell you when to get this vaccine, if needed  Follow up with your doctor as directed:  Write down your questions so you remember to ask them during your visits  © Copyright [employer] 2022 Information is for End User's use only and may not be sold, redistributed or otherwise used for commercial purposes  All illustrations and images included in CareNotes® are the copyrighted property of A D A M , Inc  or [facility] [employer]   The above information is an  only  It is not intended as medical advice for individual conditions or treatments  Talk to your doctor, nurse or pharmacist before following any medical regimen to see if it is safe and effective for you  Chief Complaint     Chief Complaint   Patient presents with   • Cold Like Symptoms     Fever, body aches, headache, chills x2 days family member positive for FLU       History of Present Illness   Luisgreta Zimmer presents to the clinic c/o  18-year-old male comes in with fever chills body aches and pains cough nasal congestion drainage that started late Thursday  Son is positive for influenza  No history of asthma or pneumonia  Does need note for work  Was sent home today due to acute illness  Review of Systems   Review of Systems   Constitutional: Positive for activity change, appetite change, chills, diaphoresis, fatigue and fever  HENT: Positive for congestion, postnasal drip, rhinorrhea and sore throat  Negative for ear pain  Respiratory: Positive for cough, chest tightness and shortness of breath  Negative for wheezing  Cardiovascular: Negative for chest pain  Gastrointestinal: Negative for abdominal pain  Musculoskeletal: Positive for myalgias  Negative for neck stiffness  Skin: Negative for rash  Neurological: Positive for headaches  Current Medications     Long-Term Medications   Medication Sig Dispense Refill   • levothyroxine 75 mcg tablet Take 1 tablet (75 mcg total) by mouth daily 90 tablet 0   • naproxen (Naprosyn) 500 mg tablet Take 1 tablet (500 mg total) by mouth 2 (two) times a day with meals (Patient not taking: Reported on 11/3/2022) 30 tablet 0       Current Allergies     Allergies as of 12/10/2022 - Reviewed 12/10/2022   Allergen Reaction Noted   • Penicillins Hives 07/13/2020          The following portions of the patient's history were reviewed and updated as appropriate: allergies, current medications, past family history, past medical history, past social history, past surgical history and problem list   Past Medical History:   Diagnosis Date   • GERD (gastroesophageal reflux disease)    • Headache(784 0) 12/27/2021    Headachs   • Hyperlipidemia    • Hypertension    • Hypothyroid    • Irregular heart beat    • Thyroid disorder    • Varicose vein of leg    • Vertigo      Past Surgical History:   Procedure Laterality Date   • PILONIDAL CYST EXCISION     • WISDOM TOOTH EXTRACTION       Family History   Problem Relation Age of Onset   • Hypothyroidism Mother    • BARBARA disease Mother    • Hypertension Father    • Heart disease Maternal Grandfather    • Heart disease Paternal Grandfather    • Arthritis Family    • Heart disease Family    • Hypertension Family        Objective   /82   Pulse 90   Temp 98 9 °F (37 2 °C)   Resp 20   Ht 6' 1" (1 854 m)   Wt 124 kg (272 lb 9 6 oz)   SpO2 99%   BMI 35 97 kg/m²   No LMP for male patient  Physical Exam     Physical Exam  Vitals and nursing note reviewed  Constitutional:       General: He is not in acute distress  Appearance: He is well-developed   He is ill-appearing and diaphoretic  He is not toxic-appearing  Comments: No trismus or conversational dyspnea  Appears mildly ill but in no acute distress  HENT:      Head: Normocephalic and atraumatic  Right Ear: Tympanic membrane, ear canal and external ear normal       Left Ear: Tympanic membrane, ear canal and external ear normal       Nose: Congestion and rhinorrhea present  Mouth/Throat:      Mouth: Mucous membranes are moist       Pharynx: Posterior oropharyngeal erythema present  No oropharyngeal exudate  Comments: Cobblestoning posterior pharynx with patchy redness  Eyes:      General: No scleral icterus  Right eye: No discharge  Left eye: No discharge  Conjunctiva/sclera: Conjunctivae normal       Pupils: Pupils are equal, round, and reactive to light  Neck:      Trachea: No tracheal deviation  Cardiovascular:      Rate and Rhythm: Normal rate and regular rhythm  Heart sounds: Normal heart sounds  No murmur heard  No friction rub  No gallop  Pulmonary:      Effort: Pulmonary effort is normal  No respiratory distress  Breath sounds: Normal breath sounds  No stridor  No wheezing, rhonchi or rales  Musculoskeletal:      Cervical back: Normal range of motion and neck supple  No rigidity or tenderness  Lymphadenopathy:      Cervical: No cervical adenopathy  Skin:     General: Skin is warm  Findings: No rash  Comments: No acute rashes   Neurological:      General: No focal deficit present  Mental Status: He is alert and oriented to person, place, and time     Psychiatric:         Mood and Affect: Mood normal          Behavior: Behavior normal

## 2022-12-10 NOTE — PATIENT INSTRUCTIONS
Influenza   AMBULATORY CARE:   Influenza  (the flu) is an infection caused by the influenza virus  The flu is easily spread when an infected person coughs, sneezes, or has close contact with others  You may be able to spread the flu to others for 1 week or longer after signs or symptoms appear  Common signs and symptoms include the following:   Fever and chills    Headaches, body aches, and muscle or joint pain    Cough, runny nose, and sore throat    Loss of appetite, nausea, vomiting, or diarrhea    Tiredness    Trouble breathing    Call your local emergency number (911 in the [address] [facility],[address]) if:   You have trouble breathing, and your lips look purple or blue  You have a seizure  Seek care immediately if:   You are dizzy, or you are urinating less or not at all  You have a headache with a stiff neck, and you feel tired or confused  You have new pain or pressure in your chest     Your symptoms, such as shortness of breath, vomiting, or diarrhea, get worse  Your symptoms, such as fever and coughing, seem to get better, but then get worse  Call your doctor if:   You have new muscle pain or weakness  You have questions or concerns about your condition or care  Treatment for influenza  may include any of the following:  Acetaminophen  decreases pain and fever  It is available without a doctor's order  Ask how much to take and how often to take it  Follow directions  Read the labels of all other medicines you are using to see if they also contain acetaminophen, or ask your doctor or pharmacist  Acetaminophen can cause liver damage if not taken correctly  Do not use more than 4 grams (4,000 milligrams) total of acetaminophen in one day  NSAIDs , such as ibuprofen, help decrease swelling, pain, and fever  This medicine is available with or without a doctor's order  NSAIDs can cause stomach bleeding or kidney problems in certain people   If you take blood thinner medicine, always ask your healthcare provider if NSAIDs are safe for you  Always read the medicine label and follow directions  Antivirals  help fight a viral infection  Manage your symptoms:   Rest  as much as you can to help you recover  Drink liquids as directed  to help prevent dehydration  Ask how much liquid to drink each day and which liquids are best for you  Prevent the spread of germs:       Wash your hands often  Wash your hands several times each day  Wash after you use the bathroom, change a child's diaper, and before you prepare or eat food  Use soap and water every time  Rub your soapy hands together, lacing your fingers  Wash the front and back of your hands, and in between your fingers  Use the fingers of one hand to scrub under the fingernails of the other hand  Wash for at least 20 seconds  Rinse with warm, running water for several seconds  Then dry your hands with a clean towel or paper towel  Use hand  that contains alcohol if soap and water are not available  Do not touch your eyes, nose, or mouth without washing your hands first          Cover a sneeze or cough  Use a tissue that covers your mouth and nose  Throw the tissue away in a trash can right away  Use the bend of your arm if a tissue is not available  Wash your hands well with soap and water or use a hand   Stay away from others while you are sick  Avoid crowds as much as possible  Ask about vaccines you may need  Talk to your healthcare provider about your vaccine history  He or she will tell you which vaccines you need, and when to get them  Get the influenza (flu) vaccine as soon as it is available  Flu viruses change, so it is important to get a flu vaccine every year  Get the pneumonia vaccine if recommended  This vaccine is usually recommended every 5 years  Your provider will tell you when to get this vaccine, if needed      Follow up with your doctor as directed:  Write down your questions so you remember to ask them during your visits  © Copyright [employer] 2022 Information is for End User's use only and may not be sold, redistributed or otherwise used for commercial purposes  All illustrations and images included in CareNotes® are the copyrighted property of A D A M , Inc  or Julián Esparza  The above information is an  only  It is not intended as medical advice for individual conditions or treatments  Talk to your doctor, nurse or pharmacist before following any medical regimen to see if it is safe and effective for you

## 2022-12-12 LAB
FLUAV RNA RESP QL NAA+PROBE: POSITIVE
FLUBV RNA RESP QL NAA+PROBE: NEGATIVE
SARS-COV-2 RNA RESP QL NAA+PROBE: NEGATIVE

## 2022-12-14 ENCOUNTER — HOSPITAL ENCOUNTER (OUTPATIENT)
Dept: MRI IMAGING | Facility: HOSPITAL | Age: 32
Discharge: HOME | End: 2022-12-14

## 2022-12-14 DIAGNOSIS — R51.9 INTRACTABLE EPISODIC HEADACHE, UNSPECIFIED HEADACHE TYPE: ICD-10-CM

## 2022-12-14 DIAGNOSIS — H93.13 TINNITUS OF BOTH EARS: ICD-10-CM

## 2022-12-14 RX ADMIN — GADOBUTROL 12 ML: 604.72 INJECTION INTRAVENOUS at 15:48

## 2023-02-15 DIAGNOSIS — E03.9 ACQUIRED HYPOTHYROIDISM: ICD-10-CM

## 2023-02-16 RX ORDER — LEVOTHYROXINE SODIUM 0.07 MG/1
75 TABLET ORAL DAILY
Qty: 90 TABLET | Refills: 0 | Status: SHIPPED | OUTPATIENT
Start: 2023-02-16

## 2023-03-13 ENCOUNTER — OFFICE VISIT (OUTPATIENT)
Dept: FAMILY MEDICINE CLINIC | Facility: CLINIC | Age: 33
End: 2023-03-13

## 2023-03-13 VITALS
WEIGHT: 274 LBS | TEMPERATURE: 96.7 F | BODY MASS INDEX: 36.31 KG/M2 | HEART RATE: 77 BPM | HEIGHT: 73 IN | DIASTOLIC BLOOD PRESSURE: 76 MMHG | SYSTOLIC BLOOD PRESSURE: 124 MMHG | RESPIRATION RATE: 16 BRPM | OXYGEN SATURATION: 98 %

## 2023-03-13 DIAGNOSIS — D75.1 POLYCYTHEMIA: ICD-10-CM

## 2023-03-13 DIAGNOSIS — Z13.6 SCREENING FOR CARDIOVASCULAR CONDITION: ICD-10-CM

## 2023-03-13 DIAGNOSIS — E03.9 ACQUIRED HYPOTHYROIDISM: ICD-10-CM

## 2023-03-13 DIAGNOSIS — Z00.00 ENCOUNTER FOR ANNUAL PHYSICAL EXAM: Primary | ICD-10-CM

## 2023-03-13 DIAGNOSIS — K52.9 GASTROENTERITIS: ICD-10-CM

## 2023-03-13 DIAGNOSIS — E66.09 CLASS 2 OBESITY DUE TO EXCESS CALORIES WITHOUT SERIOUS COMORBIDITY WITH BODY MASS INDEX (BMI) OF 35.0 TO 35.9 IN ADULT: ICD-10-CM

## 2023-03-13 DIAGNOSIS — R82.998 FROTHY URINE: ICD-10-CM

## 2023-03-13 DIAGNOSIS — E55.9 VITAMIN D DEFICIENCY: ICD-10-CM

## 2023-03-13 PROBLEM — Z13.228 SCREENING FOR METABOLIC DISORDER: Status: RESOLVED | Noted: 2022-01-26 | Resolved: 2023-03-13

## 2023-03-13 PROBLEM — E66.812 CLASS 2 OBESITY DUE TO EXCESS CALORIES WITHOUT SERIOUS COMORBIDITY WITH BODY MASS INDEX (BMI) OF 35.0 TO 35.9 IN ADULT: Status: ACTIVE | Noted: 2023-03-13

## 2023-03-13 PROBLEM — R03.0 ELEVATED BP WITHOUT DIAGNOSIS OF HYPERTENSION: Status: RESOLVED | Noted: 2022-01-26 | Resolved: 2023-03-13

## 2023-03-13 PROBLEM — R79.89 ELEVATED SERUM CREATININE: Status: RESOLVED | Noted: 2022-02-02 | Resolved: 2023-03-13

## 2023-03-13 PROBLEM — D58.2 ELEVATED HEMOGLOBIN (HCC): Status: RESOLVED | Noted: 2022-02-02 | Resolved: 2023-03-13

## 2023-03-13 NOTE — PATIENT INSTRUCTIONS
Wellness Visit for Adults   AMBULATORY CARE:   A wellness visit  is when you see your healthcare provider to get screened for health problems  Your healthcare provider will also give you advice on how to stay healthy  Write down your questions so you remember to ask them  Ask your healthcare provider how often you should have a wellness visit  What happens at a wellness visit:  Your healthcare provider will ask about your health, and your family history of health problems  This includes high blood pressure, heart disease, and cancer  He or she will ask if you have symptoms that concern you, if you smoke, and about your mood  You may also be asked about your intake of medicines, supplements, food, and alcohol  Any of the following may be done: Your weight  will be checked  Your height may also be checked so your body mass index (BMI) can be calculated  Your BMI shows if you are at a healthy weight  Your blood pressure  and heart rate will be checked  Your temperature may also be checked  Blood and urine tests  may be done  Blood tests may be done to check your cholesterol levels  Abnormal cholesterol levels increase your risk for heart disease and stroke  You may also need a blood or urine test to check for diabetes if you are at increased risk  Urine tests may be done to look for signs of an infection or kidney disease  A physical exam  includes checking your heartbeat and lungs with a stethoscope  Your healthcare provider may also check your skin to look for sun damage  Screening tests  may be recommended  A screening test is done to check for diseases that may not cause symptoms  The screening tests you may need depend on your age, gender, family history, and lifestyle habits  For example, colorectal screening may be recommended if you are 48years old or older  Screening tests you need if you are a woman:   A Pap smear  is used to screen for cervical cancer   Pap smears are usually done every 3 to 5 years depending on your age  You may need them more often if you have had abnormal Pap smear test results in the past  Ask your healthcare provider how often you should have a Pap smear  A mammogram  is an x-ray of your breasts to screen for breast cancer  Experts recommend mammograms every 2 years starting at age 48 years  You may need a mammogram at age 52 years or younger if you have an increased risk for breast cancer  Talk to your healthcare provider about when you should start having mammograms and how often you need them  Vaccines you may need:   Get an influenza vaccine  every year  The influenza vaccine protects you from the flu  Several types of viruses cause the flu  The viruses change over time, so new vaccines are made each year  Get a tetanus-diphtheria (Td) booster vaccine  every 10 years  This vaccine protects you against tetanus and diphtheria  Tetanus is a severe infection that may cause painful muscle spasms and lockjaw  Diphtheria is a severe bacterial infection that causes a thick covering in the back of your mouth and throat  Get a human papillomavirus (HPV) vaccine  if you are female and aged 23 to 32 or male 23 to 24 and never received it  This vaccine protects you from HPV infection  HPV is the most common infection spread by sexual contact  HPV may also cause vaginal, penile, and anal cancers  Get a pneumococcal vaccine  if you are aged 72 years or older  The pneumococcal vaccine is an injection given to protect you from pneumococcal disease  Pneumococcal disease is an infection caused by pneumococcal bacteria  The infection may cause pneumonia, meningitis, or an ear infection  Get a shingles vaccine  if you are 60 or older, even if you have had shingles before  The shingles vaccine is an injection to protect you from the varicella-zoster virus  This is the same virus that causes chickenpox   Shingles is a painful rash that develops in people who had chickenpox or have been exposed to the virus  How to eat healthy:  My Plate is a model for planning healthy meals  It shows the types and amounts of foods that should go on your plate  Fruits and vegetables make up about half of your plate, and grains and protein make up the other half  A serving of dairy is included on the side of your plate  The amount of calories and serving sizes you need depends on your age, gender, weight, and height  Examples of healthy foods are listed below:  Eat a variety of vegetables  such as dark green, red, and orange vegetables  You can also include canned vegetables low in sodium (salt) and frozen vegetables without added butter or sauces  Eat a variety of fresh fruits , canned fruit in 100% juice, frozen fruit, and dried fruit  Include whole grains  At least half of the grains you eat should be whole grains  Examples include whole-wheat bread, wheat pasta, brown rice, and whole-grain cereals such as oatmeal     Eat a variety of protein foods such as seafood (fish and shellfish), lean meat, and poultry without skin (turkey and chicken)  Examples of lean meats include pork leg, shoulder, or tenderloin, and beef round, sirloin, tenderloin, and extra lean ground beef  Other protein foods include eggs and egg substitutes, beans, peas, soy products, nuts, and seeds  Choose low-fat dairy products such as skim or 1% milk or low-fat yogurt, cheese, and cottage cheese  Limit unhealthy fats  such as butter, hard margarine, and shortening  Exercise:  Exercise at least 30 minutes per day on most days of the week  Some examples of exercise include walking, biking, dancing, and swimming  You can also fit in more physical activity by taking the stairs instead of the elevator or parking farther away from stores  Include muscle strengthening activities 2 days each week  Regular exercise provides many health benefits   It helps you manage your weight, and decreases your risk for type 2 diabetes, heart disease, stroke, and high blood pressure  Exercise can also help improve your mood  Ask your healthcare provider about the best exercise plan for you  General health and safety guidelines:   Do not smoke  Nicotine and other chemicals in cigarettes and cigars can cause lung damage  Ask your healthcare provider for information if you currently smoke and need help to quit  E-cigarettes or smokeless tobacco still contain nicotine  Talk to your healthcare provider before you use these products  Limit alcohol  A drink of alcohol is 12 ounces of beer, 5 ounces of wine, or 1½ ounces of liquor  Lose weight, if needed  Being overweight increases your risk of certain health conditions  These include heart disease, high blood pressure, type 2 diabetes, and certain types of cancer  Protect your skin  Do not sunbathe or use tanning beds  Use sunscreen with a SPF 15 or higher  Apply sunscreen at least 15 minutes before you go outside  Reapply sunscreen every 2 hours  Wear protective clothing, hats, and sunglasses when you are outside  Drive safely  Always wear your seatbelt  Make sure everyone in your car wears a seatbelt  A seatbelt can save your life if you are in an accident  Do not use your cell phone when you are driving  This could distract you and cause an accident  Pull over if you need to make a call or send a text message  Practice safe sex  Use latex condoms if are sexually active and have more than one partner  Your healthcare provider may recommend screening tests for sexually transmitted infections (STIs)  Wear helmets, lifejackets, and protective gear  Always wear a helmet when you ride a bike or motorcycle, go skiing, or play sports that could cause a head injury  Wear protective equipment when you play sports  Wear a lifejacket when you are on a boat or doing water sports      © Copyright [facility] 2022 Information is for End User's use only and may not be sold, redistributed or otherwise used for commercial purposes  The above information is an  only  It is not intended as medical advice for individual conditions or treatments  Talk to your doctor, nurse or pharmacist before following any medical regimen to see if it is safe and effective for you

## 2023-03-13 NOTE — ASSESSMENT & PLAN NOTE
CPE done  Last Tdap was in 2018  Pt advised to follow a well balanced, heart healthy diet and to exercise on a regular basis  Not a smoker  BP ok

## 2023-03-13 NOTE — PROGRESS NOTES
BMI Counseling: Body mass index is 36 15 kg/m²  The BMI is above normal  Nutrition recommendations include decreasing portion sizes, encouraging healthy choices of fruits and vegetables, consuming healthier snacks and moderation in carbohydrate intake  Exercise recommendations include exercising 3-5 times per week  No pharmacotherapy was ordered  Rationale for BMI follow-up plan is due to patient being overweight or obese  Depression Screening and Follow-up Plan: Patient was screened for depression during today's encounter  They screened negative with a PHQ-2 score of 0  Assessment/Plan:         Problem List Items Addressed This Visit        Digestive    Gastroenteritis     Pt has had it for past 1 week and thinks he saw worms in stool  Will check stool studies  Relevant Orders    Giardia lamblia, EIA and Ova and Parasites Examination    Clostridium difficile toxin by PCR    Stool Enteric Bacterial Panel by PCR       Endocrine    Hypothyroidism     Recheck TSH and Free T4  Continue levothyroxine 75 mcg qd  Relevant Orders    TSH, 3rd generation    T4, free       Other    Vitamin D deficiency     Recheck level  Continue 2000 U qd  Relevant Orders    Vitamin D 25 hydroxy    Polycythemia     Pt advised again to follow-up with Hematology  Pt advised to donate blood on a regular basis  Relevant Orders    CBC and differential    Frothy urine     Will check U/A         Relevant Orders    UA (URINE) with reflex to Scope    Encounter for annual physical exam - Primary     CPE done  Last Tdap was in 2018  Pt advised to follow a well balanced, heart healthy diet and to exercise on a regular basis  Not a smoker  BP ok  Class 2 obesity due to excess calories without serious comorbidity with body mass index (BMI) of 35 0 to 35 9 in adult     Discussed smaller portions, healthy snacks, and regular exercise          Other Visit Diagnoses     Screening for cardiovascular condition Relevant Orders    Lipid panel            Subjective:      Patient ID: Wanda Villalta is a 34 y o  male  Pt here for physical  Doing ok  No cp/sob  Pt has had chronic tinnitis and has seen ENT  Pt also has had irritable BMs for past week or so and noticed what he thinks are worms in his stools  No recent travel  Pt does not eat poorly cooked foods  Pt also has had frothy urine  Last Tdap was in 2018  Had 1 COVID vaccine and had SEs  Doesn't get flu shots  Not a smoker  No ETOH use  No regular exercise  The following portions of the patient's history were reviewed and updated as appropriate:   Past Medical History:  He has a past medical history of GERD (gastroesophageal reflux disease), Headache(784 0) (12/27/2021), Hyperlipidemia, Hypertension, Hypothyroid, Irregular heart beat, Thyroid disorder, Varicose vein of leg, and Vertigo  ,  _______________________________________________________________________  Medical Problems:  does not have any pertinent problems on file ,  _______________________________________________________________________  Past Surgical History:   has a past surgical history that includes PILONIDAL CYST EXCISION and [place] tooth extraction  ,  _______________________________________________________________________  Family History:  family history includes Arthritis in his family; BARBARA disease in his mother; Heart disease in his family, maternal grandfather, and paternal grandfather; Hypertension in his family and father; Hypothyroidism in his mother ,  _______________________________________________________________________  Social History:   reports that he has never smoked  He has never used smokeless tobacco  He reports that he does not currently use alcohol  He reports that he does not currently use drugs  ,  _______________________________________________________________________  Allergies:  is allergic to penicillins and latex   _______________________________________________________________________  Current Outpatient Medications   Medication Sig Dispense Refill   • levothyroxine 75 mcg tablet Take 1 tablet (75 mcg total) by mouth daily 90 tablet 0   • triamcinolone (KENALOG) 0 1 % ointment Apply 1 application  topically 2 (two) times a day       No current facility-administered medications for this visit      _______________________________________________________________________  Review of Systems   Constitutional: Negative for activity change, appetite change, fatigue and unexpected weight change  HENT: Negative for congestion, ear pain, rhinorrhea, sore throat and trouble swallowing  Ringing in ears   Eyes: Negative for pain, discharge and visual disturbance  Respiratory: Negative for cough, shortness of breath and wheezing  Cardiovascular: Negative for chest pain, palpitations and leg swelling  Gastrointestinal: Positive for abdominal pain  Negative for constipation, diarrhea, nausea and vomiting  Change in BMs   Endocrine: Negative for polydipsia, polyphagia and polyuria  Genitourinary: Negative for difficulty urinating and hematuria  Frothy urine   Musculoskeletal: Positive for myalgias  Negative for arthralgias, back pain, gait problem and neck pain  Skin: Negative for color change and rash  Neurological: Negative for dizziness, weakness and headaches  Hematological: Negative for adenopathy  Does not bruise/bleed easily  Psychiatric/Behavioral: Negative for dysphoric mood and sleep disturbance  The patient is not nervous/anxious  Objective:  Vitals:    03/13/23 1024   BP: 124/76   BP Location: Left arm   Patient Position: Sitting   Cuff Size: Large   Pulse: 77   Resp: 16   Temp: (!) 96 7 °F (35 9 °C)   TempSrc: Tympanic   SpO2: 98%   Weight: 124 kg (274 lb)   Height: 6' 1" (1 854 m)     Body mass index is 36 15 kg/m²  Physical Exam  Vitals and nursing note reviewed  Constitutional:       Appearance: Normal appearance  He is well-developed  He is obese  HENT:      Head: Normocephalic and atraumatic  Right Ear: Tympanic membrane, ear canal and external ear normal       Left Ear: Tympanic membrane, ear canal and external ear normal       Nose: Nose normal       Mouth/Throat:      Mouth: Mucous membranes are moist       Pharynx: Oropharynx is clear  No posterior oropharyngeal erythema  Eyes:      Conjunctiva/sclera: Conjunctivae normal       Pupils: Pupils are equal, round, and reactive to light  Neck:      Thyroid: No thyromegaly  Cardiovascular:      Rate and Rhythm: Normal rate and regular rhythm  Heart sounds: Normal heart sounds  No murmur heard  Pulmonary:      Effort: Pulmonary effort is normal       Breath sounds: Normal breath sounds  No wheezing or rales  Abdominal:      General: There is no distension  Palpations: Abdomen is soft  There is no mass  Tenderness: There is abdominal tenderness  Comments: Increased BS and lower abd TTP   Musculoskeletal:         General: No deformity  Normal range of motion  Cervical back: Normal range of motion and neck supple  Right lower leg: No edema  Left lower leg: No edema  Lymphadenopathy:      Cervical: No cervical adenopathy  Skin:     General: Skin is warm and dry  Capillary Refill: Capillary refill takes less than 2 seconds  Findings: No erythema or rash  Neurological:      General: No focal deficit present  Mental Status: He is alert and oriented to person, place, and time  Mental status is at baseline  Cranial Nerves: No cranial nerve deficit  Sensory: No sensory deficit  Motor: No abnormal muscle tone  Coordination: Coordination normal    Psychiatric:         Mood and Affect: Mood normal          Behavior: Behavior normal          Thought Content:  Thought content normal          Judgment: Judgment normal

## 2023-04-13 ENCOUNTER — PATIENT MESSAGE (OUTPATIENT)
Dept: PSYCHIATRY | Facility: CLINIC | Age: 33
End: 2023-04-13

## 2023-05-12 PROBLEM — K52.9 GASTROENTERITIS: Status: RESOLVED | Noted: 2023-03-13 | Resolved: 2023-05-12

## 2023-09-18 ENCOUNTER — APPOINTMENT (OUTPATIENT)
Dept: LAB | Age: 33
End: 2023-09-18
Payer: COMMERCIAL

## 2023-09-18 ENCOUNTER — TELEPHONE (OUTPATIENT)
Dept: FAMILY MEDICINE CLINIC | Facility: CLINIC | Age: 33
End: 2023-09-18

## 2023-09-18 DIAGNOSIS — D75.1 POLYCYTHEMIA: ICD-10-CM

## 2023-09-18 DIAGNOSIS — R79.89 ABNORMAL CBC MEASUREMENT: Primary | ICD-10-CM

## 2023-09-18 DIAGNOSIS — E55.9 VITAMIN D DEFICIENCY: ICD-10-CM

## 2023-09-18 DIAGNOSIS — E03.9 ACQUIRED HYPOTHYROIDISM: ICD-10-CM

## 2023-09-18 DIAGNOSIS — Z13.6 SCREENING FOR CARDIOVASCULAR CONDITION: ICD-10-CM

## 2023-09-18 LAB
25(OH)D3 SERPL-MCNC: 17.3 NG/ML (ref 30–100)
BASOPHILS # BLD AUTO: 0.04 THOUSANDS/ÂΜL (ref 0–0.1)
BASOPHILS NFR BLD AUTO: 1 % (ref 0–1)
CHOLEST SERPL-MCNC: 168 MG/DL
EOSINOPHIL # BLD AUTO: 0.2 THOUSAND/ÂΜL (ref 0–0.61)
EOSINOPHIL NFR BLD AUTO: 3 % (ref 0–6)
ERYTHROCYTE [DISTWIDTH] IN BLOOD BY AUTOMATED COUNT: 12.1 % (ref 11.6–15.1)
HCT VFR BLD AUTO: 53.5 % (ref 36.5–49.3)
HDLC SERPL-MCNC: 41 MG/DL
HGB BLD-MCNC: 17.9 G/DL (ref 12–17)
IMM GRANULOCYTES # BLD AUTO: 0.02 THOUSAND/UL (ref 0–0.2)
IMM GRANULOCYTES NFR BLD AUTO: 0 % (ref 0–2)
LDLC SERPL CALC-MCNC: 103 MG/DL (ref 0–100)
LYMPHOCYTES # BLD AUTO: 2.68 THOUSANDS/ÂΜL (ref 0.6–4.47)
LYMPHOCYTES NFR BLD AUTO: 38 % (ref 14–44)
MCH RBC QN AUTO: 29.5 PG (ref 26.8–34.3)
MCHC RBC AUTO-ENTMCNC: 33.5 G/DL (ref 31.4–37.4)
MCV RBC AUTO: 88 FL (ref 82–98)
MONOCYTES # BLD AUTO: 0.66 THOUSAND/ÂΜL (ref 0.17–1.22)
MONOCYTES NFR BLD AUTO: 9 % (ref 4–12)
NEUTROPHILS # BLD AUTO: 3.53 THOUSANDS/ÂΜL (ref 1.85–7.62)
NEUTS SEG NFR BLD AUTO: 49 % (ref 43–75)
NONHDLC SERPL-MCNC: 127 MG/DL
NRBC BLD AUTO-RTO: 0 /100 WBCS
PLATELET # BLD AUTO: 264 THOUSANDS/UL (ref 149–390)
PMV BLD AUTO: 9.6 FL (ref 8.9–12.7)
RBC # BLD AUTO: 6.07 MILLION/UL (ref 3.88–5.62)
T4 FREE SERPL-MCNC: 1.01 NG/DL (ref 0.61–1.12)
TRIGL SERPL-MCNC: 122 MG/DL
TSH SERPL DL<=0.05 MIU/L-ACNC: 4.52 UIU/ML (ref 0.45–4.5)
WBC # BLD AUTO: 7.13 THOUSAND/UL (ref 4.31–10.16)

## 2023-09-18 PROCEDURE — 82306 VITAMIN D 25 HYDROXY: CPT

## 2023-09-18 PROCEDURE — 85025 COMPLETE CBC W/AUTO DIFF WBC: CPT

## 2023-09-18 PROCEDURE — 84439 ASSAY OF FREE THYROXINE: CPT

## 2023-09-18 PROCEDURE — 80061 LIPID PANEL: CPT

## 2023-09-18 PROCEDURE — 36415 COLL VENOUS BLD VENIPUNCTURE: CPT

## 2023-09-18 PROCEDURE — 84443 ASSAY THYROID STIM HORMONE: CPT

## 2023-09-20 DIAGNOSIS — E55.9 VITAMIN D DEFICIENCY: Primary | ICD-10-CM

## 2023-09-20 DIAGNOSIS — E03.9 ACQUIRED HYPOTHYROIDISM: ICD-10-CM

## 2023-09-20 RX ORDER — LEVOTHYROXINE SODIUM 0.07 MG/1
75 TABLET ORAL DAILY
Qty: 90 TABLET | Refills: 0 | Status: SHIPPED | OUTPATIENT
Start: 2023-09-20

## 2023-10-12 ENCOUNTER — RA CDI HCC (OUTPATIENT)
Dept: OTHER | Facility: HOSPITAL | Age: 33
End: 2023-10-12

## 2023-10-12 NOTE — PROGRESS NOTES
[address] [facility] coding opportunities       Chart reviewed, no opportunity found: CHART REVIEWED, NO OPPORTUNITY FOUND        Patients Insurance        Commercial Insurance: Conor Moncada

## 2023-10-16 ENCOUNTER — APPOINTMENT (OUTPATIENT)
Dept: LAB | Facility: HOSPITAL | Age: 33
End: 2023-10-16
Payer: COMMERCIAL

## 2023-10-16 ENCOUNTER — CONSULT (OUTPATIENT)
Dept: HEMATOLOGY ONCOLOGY | Facility: CLINIC | Age: 33
End: 2023-10-16
Payer: COMMERCIAL

## 2023-10-16 VITALS
DIASTOLIC BLOOD PRESSURE: 80 MMHG | OXYGEN SATURATION: 99 % | HEIGHT: 73 IN | HEART RATE: 72 BPM | TEMPERATURE: 97.2 F | SYSTOLIC BLOOD PRESSURE: 142 MMHG | BODY MASS INDEX: 33.93 KG/M2 | WEIGHT: 256 LBS

## 2023-10-16 DIAGNOSIS — R00.2 PALPITATIONS: ICD-10-CM

## 2023-10-16 DIAGNOSIS — E03.9 ACQUIRED HYPOTHYROIDISM: ICD-10-CM

## 2023-10-16 DIAGNOSIS — D58.2 ELEVATED HEMOGLOBIN (HCC): ICD-10-CM

## 2023-10-16 DIAGNOSIS — D58.2 ELEVATED HEMOGLOBIN (HCC): Primary | ICD-10-CM

## 2023-10-16 LAB
ALBUMIN SERPL BCP-MCNC: 4.4 G/DL (ref 3.5–5)
ALP SERPL-CCNC: 88 U/L (ref 34–104)
ALT SERPL W P-5'-P-CCNC: 27 U/L (ref 7–52)
ANION GAP SERPL CALCULATED.3IONS-SCNC: 4 MMOL/L
AST SERPL W P-5'-P-CCNC: 16 U/L (ref 13–39)
BASOPHILS # BLD AUTO: 0.05 THOUSANDS/ÂΜL (ref 0–0.1)
BASOPHILS NFR BLD AUTO: 1 % (ref 0–1)
BILIRUB SERPL-MCNC: 0.95 MG/DL (ref 0.2–1)
BILIRUB UR QL STRIP: NEGATIVE
BUN SERPL-MCNC: 12 MG/DL (ref 5–25)
CALCIUM SERPL-MCNC: 9.8 MG/DL (ref 8.4–10.2)
CHLORIDE SERPL-SCNC: 103 MMOL/L (ref 96–108)
CLARITY UR: CLEAR
CO2 SERPL-SCNC: 31 MMOL/L (ref 21–32)
COLOR UR: COLORLESS
CREAT SERPL-MCNC: 1.27 MG/DL (ref 0.6–1.3)
EOSINOPHIL # BLD AUTO: 0.3 THOUSAND/ÂΜL (ref 0–0.61)
EOSINOPHIL NFR BLD AUTO: 4 % (ref 0–6)
ERYTHROCYTE [DISTWIDTH] IN BLOOD BY AUTOMATED COUNT: 11.9 % (ref 11.6–15.1)
GAS + CO PNL BLDA: 2.1 % (ref 0–1.5)
GFR SERPL CREATININE-BSD FRML MDRD: 73 ML/MIN/1.73SQ M
GLUCOSE SERPL-MCNC: 91 MG/DL (ref 65–140)
GLUCOSE UR STRIP-MCNC: NEGATIVE MG/DL
HCT VFR BLD AUTO: 52.4 % (ref 36.5–49.3)
HGB BLD-MCNC: 18.2 G/DL (ref 12–17)
HGB UR QL STRIP.AUTO: NEGATIVE
IMM GRANULOCYTES # BLD AUTO: 0.03 THOUSAND/UL (ref 0–0.2)
IMM GRANULOCYTES NFR BLD AUTO: 0 % (ref 0–2)
KETONES UR STRIP-MCNC: NEGATIVE MG/DL
LEUKOCYTE ESTERASE UR QL STRIP: NEGATIVE
LYMPHOCYTES # BLD AUTO: 2.67 THOUSANDS/ÂΜL (ref 0.6–4.47)
LYMPHOCYTES NFR BLD AUTO: 34 % (ref 14–44)
MCH RBC QN AUTO: 29.6 PG (ref 26.8–34.3)
MCHC RBC AUTO-ENTMCNC: 34.7 G/DL (ref 31.4–37.4)
MCV RBC AUTO: 85 FL (ref 82–98)
MONOCYTES # BLD AUTO: 0.76 THOUSAND/ÂΜL (ref 0.17–1.22)
MONOCYTES NFR BLD AUTO: 10 % (ref 4–12)
NEUTROPHILS # BLD AUTO: 3.95 THOUSANDS/ÂΜL (ref 1.85–7.62)
NEUTS SEG NFR BLD AUTO: 51 % (ref 43–75)
NITRITE UR QL STRIP: NEGATIVE
NRBC BLD AUTO-RTO: 0 /100 WBCS
PH UR STRIP.AUTO: 7 [PH]
PLATELET # BLD AUTO: 264 THOUSANDS/UL (ref 149–390)
PMV BLD AUTO: 9 FL (ref 8.9–12.7)
POTASSIUM SERPL-SCNC: 4.3 MMOL/L (ref 3.5–5.3)
PROT SERPL-MCNC: 7.3 G/DL (ref 6.4–8.4)
PROT UR STRIP-MCNC: NEGATIVE MG/DL
RBC # BLD AUTO: 6.15 MILLION/UL (ref 3.88–5.62)
SODIUM SERPL-SCNC: 138 MMOL/L (ref 135–147)
SP GR UR STRIP.AUTO: 1 (ref 1–1.03)
TSH SERPL DL<=0.05 MIU/L-ACNC: 2.9 UIU/ML (ref 0.45–4.5)
UROBILINOGEN UR STRIP-ACNC: <2 MG/DL
WBC # BLD AUTO: 7.76 THOUSAND/UL (ref 4.31–10.16)

## 2023-10-16 PROCEDURE — 81003 URINALYSIS AUTO W/O SCOPE: CPT

## 2023-10-16 PROCEDURE — 82668 ASSAY OF ERYTHROPOIETIN: CPT

## 2023-10-16 PROCEDURE — 80053 COMPREHEN METABOLIC PANEL: CPT

## 2023-10-16 PROCEDURE — 84443 ASSAY THYROID STIM HORMONE: CPT

## 2023-10-16 PROCEDURE — 82375 ASSAY CARBOXYHB QUANT: CPT

## 2023-10-16 PROCEDURE — 85025 COMPLETE CBC W/AUTO DIFF WBC: CPT

## 2023-10-16 PROCEDURE — 36415 COLL VENOUS BLD VENIPUNCTURE: CPT

## 2023-10-16 PROCEDURE — 99245 OFF/OP CONSLTJ NEW/EST HI 55: CPT | Performed by: PHYSICIAN ASSISTANT

## 2023-10-16 NOTE — PROGRESS NOTES
[address] [facility] [id number]  Hematology [address] [facility], 1990, [id number]  10/16/2023    Assessment and Plan   1. Elevated hemoglobin (HCC)  Baseline hemoglobin and hematocrit going back to 2018 demonstrated a hematocrit of about 50%. Patient now presents with a hematocrit of 53% along with other symptoms including palpitations, dizziness, elevated blood pressure. I had extensive discussion with the patient. Hemoglobin elevations can be the cause of or the result of cardiac dysfunction. Given the patient's history of cardiac issues dating back almost 3 years back to late 2022 early 2021 with an exacerbation in early 2022, patient was advised to follow-up specifically with cardiology for additional assessment see #2 below. Given the patient's personal history of taking preworkout/testosterone supplements dating back 5 years and now taking preworkout at the time of the last blood test, some of the elevated hemoglobin could be artifact. I have requested that the patient undergo evaluation with pulmonary testing which includes erythropoietin. If erythropoietin is normal patient will then be recommended to move onto myeloproliferative neoplasm testing. While patient does have fatigue, patient does not have other symptoms such as early satiety, pruritus that we would expect people to have with an elevated hemoglobin. Secondary polycythemia has a large differential, however cardiopulmonary issues are certainly at the top of the list.  Patient will hydrate well, eat breakfast and lunch and will follow-up with blood testing later this afternoon. Additional instructions will be given once erythropoietin level returns. - Ambulatory Referral to Hematology / Oncology  - CBC and differential; Future  - Comprehensive metabolic panel; Future  - UA w Reflex to Microscopic w Reflex to Culture;  Future  - Carboxyhemoglobin; Future  - Erythropoietin; Future  - TSH, 3rd generation with Free T4 reflex; Future  - Ambulatory referral to Cardiology; Future    2. Palpitations  Patient gives a long history of palpitations. Patient notes that these come and go. Holter monitor had been completed previously where the patient's heart rate had significantly increased up to 170 bpm but also could be quite low. There was a large amount of fluctuations. Patient does not recall specifically seeing a cardiologist however, given the patient's history it is time for him to be fully evaluated. Patient has not had an echocardiogram or stress test.    - TSH, 3rd generation with Free T4 reflex; Future  - Ambulatory referral to Cardiology; Future    3. Acquired hypothyroidism  Patient was diagnosed with hypothyroidism approximately 5 years ago. Patient received medication from [place] and was taking that at the time of his last blood test.  Since then, patient's medications have been refilled through the [facility]. The generic changed and the patient had started to have increased heart palpitations. I question whether the American supplementation might be too much for the patient considering the symptoms that he is exhibiting. TSH has been requested. Patient follows up with PCP for further management. Patient has been on American based medication since early September. The patient is scheduled for follow-up in approximately 4-5 weeks. Patient voiced agreement and understanding to the above. Patient knows to call the Hematology/Oncology office with any questions and concerns regarding the above. Barrier(s) to care: None. The patient is able to self care. Nicolasa Hernandez PA-C  Medical Oncology/Hematology  [address] [facility]    Subjective     Chief Complaint   Patient presents with    Consult       Referring provider    Katerin Tracy MD  [address] [place] [id number]    History of present illness: This is a 27-year-old male with past medical history of hypertension, altitude sickness with frequent trips to [place], hypertension, hypothyroidism on supplement, hyperlipidemia, varicosities of the lower extremity, palpitations who presents to the hematology clinic for evaluation of erythrocytosis and polycythemia. Patient notes that he has been told that his hemoglobin has been elevated in the past.  However, most the time this was discovered it was blamed on recent travel since the patient had been making frequent trips to [place]. Patient notes that at his first visit to [place] he had a significant episode of altitude sickness which he states hypertension has stemmed from. This episode occurred in January 2022 patient notes that he was given drops that he ended up overdosing on and having to go to the hospital for additional attention and steroids. More recently the patient has been complaining about the room spinning for 3 days or longer. Patient notes the last episode occurred for a solid day. Patient has been worked up with MRI since the patient also experienced tinnitus. She has long history of palpitations dating back to over 5 years ago. Timeline is somewhat skewed patient does not deliver history in the sense of dates that he can relate to me however, it seems that the patient has had on and off palpitations throughout the past 5 years. Previous Holter monitoring has been collected to assess for this. Patient does not have a specific diagnosis for arrhythmia. Patient notes that when he does experience this he does not always go to the hospital.  We discussed today that that is imperative to diagnosis. 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   Patient does note that in the past he has been on testosterone supplements but this was over 5 years ago. Patient does not know if the preworkout has any testosterone builder in it which also can cause elevation of hemoglobin . Patient denies pains in the legs however he does note that varicose veins of the lower extremities seem to have worsened over the past several months. No history of MI, CVA or DVT/PE. Hematologic trends:  8/18/2018 RBC 5.9, hemoglobin 17.9, hematocrit 50.2, MCV 84, platelet count 565, WBC 8.5  TSH 7.3  6/23/2021 TSH 5.3  1/18/2022 RBC 5.8, hemoglobin 17.5, hematocrit 49.6, MCV 85, RDW 12.8  1/27/2022 RBC 6.3, hemoglobin 18.9, hematocrit 54.5, MCV 85, platelet 235, WBC 4.5  10/6/2022 RBC 5.8, hemoglobin 17.1, hematocrit 50.0, MCV 85, platelet count 463, WBC 6.9    9/18/2023 RBC 6.0, hemoglobin 17.9, hematocrit 53.5, MCV 88, platelet count 535, WBC 7.1. TSH 4.5      10/16/23: Generally feels poorly. Patient notes that he has had more episodes of dizziness and heart palpitations. Patient notes that his last few episodes of heart palpitations he has not gone to the hospital.  Patient notes that previously recommended to have a stress test but did not move forward with this because of cost.  Patient takes a amino acid supplementation L-arginine. Otherwise not presently on any testosterone. Preworkout was stopped approximately 2 to 3 weeks ago. 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 Negative for appetite change, fatigue and unexpected weight change (has had weight flucuations in response to daily activity chganges with jobs, ect.). Respiratory:  Positive for cough. Negative for shortness of breath. Cardiovascular:  Positive for palpitations. Negative for leg swelling (focal varicosities left>right no family hx). Gastrointestinal:  Positive for nausea (related to spinning dizziness episodes). Negative for abdominal pain and blood in stool. Genitourinary:  Negative for hematuria.    Skin: Negative for rash. Neurological:  Positive for dizziness and light-headedness. Negative for facial asymmetry. Psychiatric/Behavioral:  Positive for sleep disturbance (snores). Past Medical History:   Diagnosis Date    GERD (gastroesophageal reflux disease)     Headache(784.0) 12/27/2021    Headachs    Hyperlipidemia     Hypertension     Hypothyroid     Irregular heart beat     Thyroid disorder     Varicose vein of leg     Vertigo      Past Surgical History:   Procedure Laterality Date    PILONIDAL CYST EXCISION      WISDOM TOOTH EXTRACTION       Family History   Problem Relation Age of Onset    Hypothyroidism Mother     BARBARA disease Mother     Hypertension Father     Heart disease Maternal Grandfather     Heart disease Paternal Grandfather     Arthritis Family     Heart disease Family     Hypertension Family      Social History     Socioeconomic History    Marital status: /Civil Union     Spouse name: None    Number of children: None    Years of education: None    Highest education level: None   Occupational History    None   Tobacco Use    Smoking status: Never    Smokeless tobacco: Never   Vaping Use    Vaping Use: Never used   Substance and Sexual Activity    Alcohol use: Not Currently     Alcohol/week: 0.0 standard drinks of alcohol     Comment: socially    Drug use: Not Currently    Sexual activity: Yes     Partners: Male   Other Topics Concern    None   Social History Narrative    None     Social Determinants of Health     Financial Resource Strain: Not on file   Food Insecurity: Not on file   Transportation Needs: Not on file   Physical Activity: Not on file   Stress: Not on file   Social Connections: Not on file   Intimate Partner Violence: Not on file   Housing Stability: Not on file         Current Outpatient Medications:     levothyroxine 75 mcg tablet, Take 1 tablet (75 mcg total) by mouth daily, Disp: 90 tablet, Rfl: 0    triamcinolone (KENALOG) 0.1 % ointment, Apply 1 application. topically 2 (two) times a day, Disp: , Rfl:   Allergies   Allergen Reactions    Penicillins Hives    Latex Rash       Objective   /80 (BP Location: Left arm, Patient Position: Sitting, Cuff Size: Large)   Pulse 72   Temp (!) 97.2 °F (36.2 °C) (Temporal)   Ht 6' 1" (1.854 m)   Wt 116 kg (256 lb)   SpO2 99%   BMI 33.78 kg/m²   Physical Exam  Constitutional:       Appearance: He is not ill-appearing. HENT:      Head: Normocephalic and atraumatic. Eyes:      General: No scleral icterus. Conjunctiva/sclera: Conjunctivae normal.   Cardiovascular:      Rate and Rhythm: Normal rate and regular rhythm. Heart sounds: No murmur heard. Pulmonary:      Effort: Pulmonary effort is normal. No respiratory distress. Abdominal:      General: Bowel sounds are normal.      Palpations: Abdomen is soft. There is no hepatomegaly or splenomegaly. Tenderness: There is no abdominal tenderness. Musculoskeletal:      Right lower leg: No edema. Left lower leg: No edema. Skin:     General: Skin is warm. Coloration: Skin is not jaundiced or pale. Neurological:      General: No focal deficit present. Mental Status: He is alert and oriented to person, place, and time. Psychiatric:         Mood and Affect: Mood normal.         Result Review  Labs:  Appointment on 09/18/2023   Component Date Value Ref Range Status    TSH 3RD GENERATON 09/18/2023 4.520 (H)  0.450 - 4.500 uIU/mL Final    Adult TSH (3rd generation) reference range follows the recommended guidelines of the American Thyroid Association, January, 2020. Free T4 09/18/2023 1.01  0.61 - 1.12 ng/dL Final    Specimens with biotin concentrations > 10 ng/mL can lead to significant (> 10%) positive bias in result.     Vit D, 25-Hydroxy 09/18/2023 17.3 (L)  30.0 - 100.0 ng/mL Final    Vitamin D guidelines established by Clinical Guidelines Subcommittee  of the Endocrine Society Task Force, 2011    Deficiency <20ng/ml   Insufficiency 20-30ng/ml   Sufficient  ng/ml     WBC 09/18/2023 7.13  4.31 - 10.16 Thousand/uL Final    RBC 09/18/2023 6.07 (H)  3.88 - 5.62 Million/uL Final    Hemoglobin 09/18/2023 17.9 (H)  12.0 - 17.0 g/dL Final    Hematocrit 09/18/2023 53.5 (H)  36.5 - 49.3 % Final    MCV 09/18/2023 88  82 - 98 fL Final    MCH 09/18/2023 29.5  26.8 - 34.3 pg Final    MCHC 09/18/2023 33.5  31.4 - 37.4 g/dL Final    RDW 09/18/2023 12.1  11.6 - 15.1 % Final    MPV 09/18/2023 9.6  8.9 - 12.7 fL Final    Platelets 88/30/1940 264  149 - 390 Thousands/uL Final    nRBC 09/18/2023 0  /100 WBCs Final    Neutrophils Relative 09/18/2023 49  43 - 75 % Final    Immat GRANS % 09/18/2023 0  0 - 2 % Final    Lymphocytes Relative 09/18/2023 38  14 - 44 % Final    Monocytes Relative 09/18/2023 9  4 - 12 % Final    Eosinophils Relative 09/18/2023 3  0 - 6 % Final    Basophils Relative 09/18/2023 1  0 - 1 % Final    Neutrophils Absolute 09/18/2023 3.53  1.85 - 7.62 Thousands/µL Final    Immature Grans Absolute 09/18/2023 0.02  0.00 - 0.20 Thousand/uL Final    Lymphocytes Absolute 09/18/2023 2.68  0.60 - 4.47 Thousands/µL Final    Monocytes Absolute 09/18/2023 0.66  0.17 - 1.22 Thousand/µL Final    Eosinophils Absolute 09/18/2023 0.20  0.00 - 0.61 Thousand/µL Final    Basophils Absolute 09/18/2023 0.04  0.00 - 0.10 Thousands/µL Final    Cholesterol 09/18/2023 168  See Comment mg/dL Final    Cholesterol:         Pediatric <18 Years        Desirable          <170 mg/dL      Borderline High    170-199 mg/dL      High               >=200 mg/dL        Adult >=18 Years            Desirable         <200 mg/dL      Borderline High   200-239 mg/dL      High              >239 mg/dL      Triglycerides 09/18/2023 122  See Comment mg/dL Final    Triglyceride:     0-9Y            <75mg/dL     10Y-17Y         <90 mg/dL       >=18Y     Normal          <150 mg/dL     Borderline High 150-199 mg/dL     High            200-499 mg/dL        Very High       >499 mg/dL    Specimen collection should occur prior to Metamizole administration due to the potential for falsely depressed results. HDL, Direct 09/18/2023 41  >=40 mg/dL Final    LDL Calculated 09/18/2023 103 (H)  0 - 100 mg/dL Final    LDL Cholesterol:     Optimal           <100 mg/dl     Near Optimal      100-129 mg/dl     Above Optimal       Borderline High 130-159 mg/dl       High            160-189 mg/dl       Very High       >189 mg/dl         This screening LDL is a calculated result. It does not have the accuracy of the Direct Measured LDL in the monitoring of patients with hyperlipidemia and/or statin therapy. Direct Measure LDL (MHH123) must be ordered separately in these patients. Non-HDL-Chol (CHOL-HDL) 09/18/2023 127  mg/dl Final       Imaging:   MRI brain IAC wo and w contrast  Narrative: MRI BRAIN AND IAC'S -  WITH AND WITHOUT CONTRAST    INDICATION: R51.9: Headache, unspecified  H93.13: Tinnitus, bilateral.    COMPARISON:  November 21, 2022    TECHNIQUE:  Multiplanar, multisequence imaging of the brain and IAC's was performed. Targeted images of the IAC'S were performed requiring additional time at acquisition and interpretation of approximately 25%    IV Contrast:  12 mL of Gadobutrol injection (SINGLE-DOSE)     IMAGE QUALITY:   Diagnostic. FINDINGS:    BRAIN PARENCHYMA:  There is no discrete mass, mass effect or midline shift. Brainstem and cerebellum demonstrate normal signal. There is no intracranial hemorrhage. There is no evidence of acute infarction and diffusion imaging is unremarkable. There   are no white matter changes in the cerebral hemispheres. Normal postcontrast imaging. IAC'S:  No CP angle mass or abnormal enhancement. Normal aeration of the mastoid air cells and middle ear cavity. VENTRICLES:  Normal for the patient's age.     SELLA AND PITUITARY GLAND:  Normal.    ORBITS:  Normal.    PARANASAL SINUSES:  Normal.    VASCULATURE:  Evaluation of the major intracranial vasculature demonstrates appropriate flow voids. CALVARIUM AND SKULL BASE:  Normal.    EXTRACRANIAL SOFT TISSUES:  Normal.  Impression: Normal examination. MRI appearance of the IACs. Workstation performed: [id number]      Please note: This report has been generated by a voice recognition software system. Therefore there may be syntax, spelling, and/or grammatical errors. Please call if you have any questions.

## 2023-10-16 NOTE — PATIENT INSTRUCTIONS
[facility]  ACUITY [facility] AT [place] - [phone]    Your Team Members:  Advanced Practitioner:  Rachael Hurtado PA-C  Oncology Nurse:   Najma Robles RN ([phone]) M-F 8am - 4:30pm    Please answer Private and Unavailable Calls - this may be your team(s) contacting you.   If you have medical questions/concerns/issues - contact us either by (1) My Chart (2) [place]

## 2023-10-17 LAB — EPO SERPL-ACNC: 6.8 MIU/ML (ref 2.6–18.5)

## 2023-10-18 ENCOUNTER — TELEPHONE (OUTPATIENT)
Dept: HEMATOLOGY ONCOLOGY | Facility: CLINIC | Age: 33
End: 2023-10-18

## 2023-10-18 NOTE — TELEPHONE ENCOUNTER
Patient Call    Who are you speaking with? Patient    If it is not the patient, are they listed on an active communication consent form? N/A   What is the reason for this call? Patient calling in regards to lab results in his chart. Patient would like a call back to discuss the results and ask a few questions. Does this require a call back? Yes   If a call back is required, please list best call back number [phone]   If a call back is required, advise that a message will be forwarded to their care team and someone will return their call as soon as possible. Did you relay this information to the patient?  Yes

## 2023-10-18 NOTE — TELEPHONE ENCOUNTER
Returned call to patient. Patient aware lab results are in and inquiring if any additional recommendations from [facility]. Patient aware Meryl Quiroz out of office today and will return/review results tomorrow. Will return call with recommendations.

## 2023-10-19 NOTE — TELEPHONE ENCOUNTER
Yes it is as long as he goes to the ED if the palpations occur. Please send him for Jak2 w/ relfex testing EPO is lower than it should be for elevated hemoglobin.

## 2023-10-20 DIAGNOSIS — D58.2 ELEVATED HEMOGLOBIN (HCC): Primary | ICD-10-CM

## 2023-10-20 NOTE — TELEPHONE ENCOUNTER
Phoned patient with Whitney Meehan PA-C recommendations. Reviewed JAK2 testing and process. Patient aware if scheduling has not reached out in 2 weeks to schedule to keyla the office. Patient aware and agreeable to present to the ED with any palpitations. No additional questions.

## 2023-10-31 DIAGNOSIS — E03.9 ACQUIRED HYPOTHYROIDISM: ICD-10-CM

## 2023-10-31 RX ORDER — LEVOTHYROXINE SODIUM 0.07 MG/1
75 TABLET ORAL DAILY
Qty: 90 TABLET | Refills: 0 | Status: SHIPPED | OUTPATIENT
Start: 2023-10-31 | End: 2023-11-01 | Stop reason: SDUPTHER

## 2023-11-01 DIAGNOSIS — E03.9 ACQUIRED HYPOTHYROIDISM: ICD-10-CM

## 2023-11-01 RX ORDER — LEVOTHYROXINE SODIUM 0.07 MG/1
75 TABLET ORAL DAILY
Qty: 90 TABLET | Refills: 0 | Status: SHIPPED | OUTPATIENT
Start: 2023-11-01

## 2023-11-01 NOTE — TELEPHONE ENCOUNTER
Patient comment: Can i get another? I lost the prescription during my move yesterday. Patient originally  had the script sent to [facility] but they are refusing to refill because it is too soon. Reason for call:   [x] Refill   [] Prior Auth  [] Other:     Office:   [x] PCP/Provider - Madie Gallegos   [] Specialty/Provider -     Medication: Levothyroxine     Dose/Frequency: 75mcg QD     Quantity: 90    Pharmacy[facility]    Does the patient have enough for 3 days?    [] Yes   [x] No - Send as HP to POD

## 2023-11-03 ENCOUNTER — APPOINTMENT (OUTPATIENT)
Dept: LAB | Facility: HOSPITAL | Age: 33
End: 2023-11-03
Payer: COMMERCIAL

## 2023-11-03 DIAGNOSIS — D58.2 ELEVATED HEMOGLOBIN (HCC): ICD-10-CM

## 2023-11-03 PROCEDURE — 81338 MPL GENE COMMON VARIANTS: CPT

## 2023-11-03 PROCEDURE — 81279 JAK2 GENE TRGT SEQUENCE ALYS: CPT

## 2023-11-03 PROCEDURE — 81270 JAK2 GENE: CPT

## 2023-11-03 PROCEDURE — 36415 COLL VENOUS BLD VENIPUNCTURE: CPT

## 2023-11-10 LAB
CALR RESULT: NORMAL
CITATION REF LAB TEST: NORMAL
E12-15 RESULT: NORMAL
JAK2 P.V617F BLD/T QL: NORMAL
LAB DIRECTOR NAME PROVIDER: NORMAL
MPL RESULT: NORMAL
REF LAB TEST METHOD: NORMAL
SL AMB REFLEX: NORMAL
TEST PERFORMANCE INFO SPEC: NORMAL

## 2023-11-22 ENCOUNTER — OFFICE VISIT (OUTPATIENT)
Dept: HEMATOLOGY ONCOLOGY | Facility: CLINIC | Age: 33
End: 2023-11-22
Payer: COMMERCIAL

## 2023-11-22 ENCOUNTER — APPOINTMENT (OUTPATIENT)
Dept: LAB | Age: 33
End: 2023-11-22
Payer: COMMERCIAL

## 2023-11-22 VITALS
TEMPERATURE: 97.6 F | RESPIRATION RATE: 16 BRPM | HEART RATE: 81 BPM | WEIGHT: 257 LBS | BODY MASS INDEX: 34.06 KG/M2 | OXYGEN SATURATION: 98 % | HEIGHT: 73 IN | SYSTOLIC BLOOD PRESSURE: 134 MMHG | DIASTOLIC BLOOD PRESSURE: 86 MMHG

## 2023-11-22 DIAGNOSIS — D58.2 ELEVATED HEMOGLOBIN (HCC): ICD-10-CM

## 2023-11-22 DIAGNOSIS — R00.2 PALPITATIONS: ICD-10-CM

## 2023-11-22 DIAGNOSIS — D58.2 ELEVATED HEMOGLOBIN (HCC): Primary | ICD-10-CM

## 2023-11-22 LAB
BASOPHILS # BLD AUTO: 0.04 THOUSANDS/ÂΜL (ref 0–0.1)
BASOPHILS NFR BLD AUTO: 1 % (ref 0–1)
BILIRUB UR QL STRIP: NEGATIVE
CLARITY UR: CLEAR
COLOR UR: NORMAL
EOSINOPHIL # BLD AUTO: 0.14 THOUSAND/ÂΜL (ref 0–0.61)
EOSINOPHIL NFR BLD AUTO: 2 % (ref 0–6)
ERYTHROCYTE [DISTWIDTH] IN BLOOD BY AUTOMATED COUNT: 11.9 % (ref 11.6–15.1)
GLUCOSE UR STRIP-MCNC: NEGATIVE MG/DL
HCT VFR BLD AUTO: 50.8 % (ref 36.5–49.3)
HGB BLD-MCNC: 17 G/DL (ref 12–17)
HGB UR QL STRIP.AUTO: NEGATIVE
IMM GRANULOCYTES # BLD AUTO: 0.02 THOUSAND/UL (ref 0–0.2)
IMM GRANULOCYTES NFR BLD AUTO: 0 % (ref 0–2)
KETONES UR STRIP-MCNC: NEGATIVE MG/DL
LEUKOCYTE ESTERASE UR QL STRIP: NEGATIVE
LYMPHOCYTES # BLD AUTO: 2 THOUSANDS/ÂΜL (ref 0.6–4.47)
LYMPHOCYTES NFR BLD AUTO: 28 % (ref 14–44)
MCH RBC QN AUTO: 28.6 PG (ref 26.8–34.3)
MCHC RBC AUTO-ENTMCNC: 33.5 G/DL (ref 31.4–37.4)
MCV RBC AUTO: 86 FL (ref 82–98)
MONOCYTES # BLD AUTO: 0.53 THOUSAND/ÂΜL (ref 0.17–1.22)
MONOCYTES NFR BLD AUTO: 7 % (ref 4–12)
NEUTROPHILS # BLD AUTO: 4.49 THOUSANDS/ÂΜL (ref 1.85–7.62)
NEUTS SEG NFR BLD AUTO: 62 % (ref 43–75)
NITRITE UR QL STRIP: NEGATIVE
NRBC BLD AUTO-RTO: 0 /100 WBCS
PH UR STRIP.AUTO: 6 [PH]
PLATELET # BLD AUTO: 280 THOUSANDS/UL (ref 149–390)
PMV BLD AUTO: 9.6 FL (ref 8.9–12.7)
PROT UR STRIP-MCNC: NEGATIVE MG/DL
RBC # BLD AUTO: 5.94 MILLION/UL (ref 3.88–5.62)
SP GR UR STRIP.AUTO: 1.01 (ref 1–1.03)
UROBILINOGEN UR STRIP-ACNC: <2 MG/DL
WBC # BLD AUTO: 7.22 THOUSAND/UL (ref 4.31–10.16)

## 2023-11-22 PROCEDURE — 85025 COMPLETE CBC W/AUTO DIFF WBC: CPT

## 2023-11-22 PROCEDURE — 36415 COLL VENOUS BLD VENIPUNCTURE: CPT

## 2023-11-22 PROCEDURE — 99215 OFFICE O/P EST HI 40 MIN: CPT | Performed by: PHYSICIAN ASSISTANT

## 2023-11-22 RX ORDER — MELATONIN
1000 DAILY
COMMUNITY

## 2023-11-22 NOTE — PATIENT INSTRUCTIONS
[facility]  ACUITY [facility] AT [place] - [phone]    Your Team Members:  Advanced Practitioner:  Ricardo Zavaleta PA-C  Oncology Nurse:   Amy Vargas RN ([phone]) M-F 8am - 4:30pm    Please answer Private and Unavailable Calls - this may be your team(s) contacting you.   If you have medical questions/concerns/issues - contact us either by (1) My Chart (2) [address] for [employer]  [phone]

## 2023-11-22 NOTE — PROGRESS NOTES
[address] [facility] [id number]  Hematology [address] [facility] [place], 1990, [id number]  11/22/2023      Assessment and Plan   1. Elevated hemoglobin (HCC)  Baseline hemoglobin and hematocrit going back to 2018 demonstrated a hematocrit above 50%    Patient is presenting with a hemogram between 52 and 53% with symptoms including elevated blood pressure dizziness and palpitations. Patient was referred to cardiology appointment is pending. Patient notes symptoms are mildly improved compared to last visit. No major improvement in hemoglobin. Patient is not on any testosterone supplements replacement however given his age and curious about his natural fluctuations within testosterone this will be checked a day. Primary hematologic disorder/myeloproliferative neoplasm work-up was negative. I again discussed with the patient that secondary polycythemia differential [facility] diagnosis is very wide. I discussed doing an evaluation of the patient's chest with a CT scan to rule out pulmonary dysfunction from a structural perspective as well as to rule out mediastinal masses. CT of the abdomen pelvis was not considered necessary since urinalysis was within normal limits. [place] patient was talking about his travels across country. Patient is a  and often times is above 4000 feet in the ER especially well over the [facility]. Patient has been at least 8 weeks from his last trip. We will retest blood work to see if there is any change without intervention. Given the patient's concerns regarding concentration, a one-time order for phlebotomy was given. Pt will follow up with labs now and again in 2 months after evaluation with Cardiology and Sleep medicine.     - Testosterone;  Future  - CBC and differential; Future  - CT chest w contrast; Future  - CBC and differential; Future  - Comprehensive metabolic panel; Future  - Ambulatory Referral to Sleep Medicine; Future    2. Palpitations  Pending evaluation with cardiology  - Testosterone; Future  - CBC and differential; Future      The patient is scheduled for follow-up in approximately 2 months. Patient voiced agreement and understanding to the above. Patient advised to call the Hematology/Oncology office with any questions and concerns regarding the above. Barrier(s) to care: None  The patient is able to self care. Nicolasa Hernandez PA-C  Medical Oncology/Hematology  [address] [facility]    Subjective     Chief Complaint   Patient presents with    Follow-up     Patient being seen for f/u. Last labs 10/16/2023. History of present illness: This is a 27-year-old male with past medical history of hypertension, altitude sickness with frequent trips to [place], hypertension, hypothyroidism on supplement, hyperlipidemia, varicosities of the lower extremity, palpitations who presents to the hematology clinic for evaluation of erythrocytosis and polycythemia. Patient notes that he has been told that his hemoglobin has been elevated in the past.  However, most the time this was discovered it was blamed on recent travel since the patient had been making frequent trips to [place]. Patient notes that at his first visit to [place] he had a significant episode of altitude sickness which he states hypertension has stemmed from. This episode occurred in January 2022 patient notes that he was given drops that he ended up overdosing on and having to go to the hospital for additional attention and steroids. She has long history of palpitations dating back to over 5 years ago. Timeline is somewhat skewed patient does not deliver history in the sense of dates that he can relate to me however, it seems that the patient has had on and off palpitations throughout the past 5 years.   Previous Holter monitoring has been collected to assess for this.  Patient does not have a specific diagnosis for arrhythmia. Patient notes that when he does experience this he does not always go to the hospital.  We discussed today that that is imperative to diagnosis. 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 Patient does note that in the past he has been on testosterone supplements but this was over 5 years ago. Patient does not know if the preworkout has any testosterone builder in it which also can cause elevation of hemoglobin . Patient denies pains in the legs however he does note that varicose veins of the lower extremities seem to have worsened over the past several months. No history of MI, CVA or DVT/PE. Hematologic trends:  8/18/2018 RBC 5.9, hemoglobin 17.9, hematocrit 50.2, MCV 84, platelet count 481, WBC 8.5  TSH 7.3  6/23/2021 TSH 5.3  1/18/2022 RBC 5.8, hemoglobin 17.5, hematocrit 49.6, MCV 85, RDW 12.8  1/27/2022 RBC 6.3, hemoglobin 18.9, hematocrit 54.5, MCV 85, platelet 790, WBC 4.5  10/6/2022 RBC 5.8, hemoglobin 17.1, hematocrit 50.0, MCV 85, platelet count 344, WBC 6.9     9/18/2023 RBC 6.0, hemoglobin 17.9, hematocrit 53.5, MCV 88, platelet count 444, WBC 7.1. TSH 4.5     10/16/2023: WBC 7.7, hemoglobin 18.2, HCT 52.4, MCV 85, platelet count 147  Erythropoietin 6.8, BUN and creatinine within normal limits with EGFR of 73, LFTs WNL, negative urinalysis no occult blood, carboxyhemoglobin at 2.1%  TSH 2.89    11/3/2023: JAK2 V6 17 F-, Shan exon mutations 12 through 15 negative, MPL negative, JOVAN are negative    Interval history:  feeling better than before, not making cross country. Review of Systems   Constitutional:  Positive for fatigue. Negative for activity change, appetite change and fever. HENT:  Negative for nosebleeds. Respiratory:  Negative for cough, choking and shortness of breath.     Cardiovascular: Positive for palpitations. Negative for chest pain and leg swelling. Gastrointestinal:  Negative for abdominal distention, abdominal pain, anal bleeding, blood in stool, constipation, diarrhea, nausea and vomiting. Endocrine: Negative for cold intolerance. Genitourinary:  Negative for hematuria. Musculoskeletal:  Negative for myalgias. Skin:  Negative for color change, pallor and rash. Allergic/Immunologic: Negative for immunocompromised state. Neurological:  Negative for headaches. Hematological:  Negative for adenopathy. Does not bruise/bleed easily. All other systems reviewed and are negative.       Patient Active Problem List   Diagnosis    Hypothyroidism    Gastroesophageal reflux disease without esophagitis    Palpitations    Tinnitus of both ears    Mixed hyperlipidemia    Vitamin D deficiency    Hypercalcemia    Insomnia    Chronic nonintractable headache    Polycythemia    Varicose veins of both lower extremities    GAGANDEEP (generalized anxiety disorder)    Encounter for annual physical exam    Class 2 obesity due to excess calories without serious comorbidity with body mass index (BMI) of 35.0 to 35.9 in adult    Frothy urine     Past Medical History:   Diagnosis Date    GERD (gastroesophageal reflux disease)     Headache(784.0) 12/27/2021    Headachs    Hyperlipidemia     Hypertension     Hypothyroid     Irregular heart beat     Thyroid disorder     Varicose vein of leg     Vertigo      Past Surgical History:   Procedure Laterality Date    PILONIDAL CYST EXCISION      WISDOM TOOTH EXTRACTION       Family History   Problem Relation Age of Onset    Hypothyroidism Mother     BARBARA disease Mother     Hypertension Father     Heart disease Maternal Grandfather     Heart disease Paternal Grandfather     Arthritis Family     Heart disease Family     Hypertension Family      Social History     Socioeconomic History    Marital status: /Civil Union     Spouse name: None    Number of children: None Years of education: None    Highest education level: None   Occupational History    None   Tobacco Use    Smoking status: Never    Smokeless tobacco: Never   Vaping Use    Vaping Use: Never used   Substance and Sexual Activity    Alcohol use: Not Currently     Alcohol/week: 0.0 standard drinks of alcohol     Comment: socially    Drug use: Not Currently    Sexual activity: Yes     Partners: Male   Other Topics Concern    None   Social History Narrative    None     Social Determinants of Health     Financial Resource Strain: Not on file   Food Insecurity: Not on file   Transportation Needs: Not on file   Physical Activity: Not on file   Stress: Not on file   Social Connections: Not on file   Intimate Partner Violence: Not on file   Housing Stability: Not on file       Current Outpatient Medications:     cholecalciferol (VITAMIN D3) 1,000 units tablet, Take 1,000 Units by mouth daily, Disp: , Rfl:     levothyroxine 75 mcg tablet, Take 1 tablet (75 mcg total) by mouth daily, Disp: 90 tablet, Rfl: 0    triamcinolone (KENALOG) 0.1 % ointment, Apply 1 application. topically 2 (two) times a day, Disp: , Rfl:   Allergies   Allergen Reactions    Penicillins Hives    Latex Rash       Objective   /86 (BP Location: Right arm, Patient Position: Sitting, Cuff Size: Large)   Pulse 81   Temp 97.6 °F (36.4 °C) (Temporal)   Resp 16   Ht 6' 1" (1.854 m)   Wt 117 kg (257 lb)   SpO2 98%   BMI 33.91 kg/m²    Physical Exam  Constitutional:       General: He is not in acute distress. Appearance: He is well-developed. HENT:      Head: Normocephalic and atraumatic. Right Ear: External ear normal.      Left Ear: External ear normal.      Nose: Nose normal.   Eyes:      General: No scleral icterus. Conjunctiva/sclera: Conjunctivae normal.   Cardiovascular:      Rate and Rhythm: Normal rate. Pulmonary:      Effort: No respiratory distress. Abdominal:      General: There is no distension.       Palpations: Abdomen is soft. Skin:     Findings: No rash (on exposed skin. ). Neurological:      Mental Status: He is alert and oriented to person, place, and time. Psychiatric:         Thought Content: Thought content normal.         Result Review  Labs:  Appointment on 11/03/2023   Component Date Value Ref Range Status    JAK2 V617F Mutation Detection 11/03/2023 Comment   Final    NEGATIVE  The JAK2 V617F mutation is not detected in the provided specimen  of this individual. Results should be interpreted in conjunction  with clinical and other laboratory findings for the most accurate  interpretation. Reflex 11/03/2023 Comment   Final    Reflex to CALR Mutation Analysis, JAK2 Exon 12-15 Mutation Analysis,  and MPL Mutation Analysis is indicated. PERFORMANCE 11/03/2023 Comment   Final    Comment: Molecular testing of blood or bone marrow is useful in the  evaluation of suspected myeloproliferative neoplasms (MPN). Mutations in the JAK2, MPL, and CALR genes are present in  virtually all MPNs and their presence help distinguish benign  reactive processes from clonal neoplasms. These mutations are  generally considered mutually exclusive, although concurrent  clones have been reported in rare patients. This test will assess  for the [id number] (exon 14) mutation first and will reflex to  CALR mutation analysis, MPL mutation analysis, and JAK2 exon 12  to 15 mutation analysis if the [id number] mutation is negative. The JAK2 (Janus kinase 2) gene encodes for a non-receptor protein  tyrosine kinase that activates cytokine and growth factor  signaling. The V617F (c.1849 G>T) mutation results in  constitutive activation of JAK2 and downstream STAT5 and ERK  signaling. The V617F mutation is observed in approximately 95% of  polycythemia vera (PV), 60% of essential thrombocythemia (ET)                            and  primary myelofibrosis (PMF).  It is also infrequently present  (3-5%) in myelodysplastic syndrome, chronic myelomonocytic  leukemia, and other atypical chronic myeloid disorders. A small  percentage of JAK2 mutation positive patients (3.3%) contain  other non-V617F mutations within exons 12 to 15. In particular,  mutations in exon 12 of JAK2 have been described in approximately  3% of patients with PV. JAK2 allele burden correlates with  clinical phenotype, with low levels of mutant allele  characterized by thrombocytosis, intermediate levels with  erythrocytosis, and high mutant allele burden correlating with  enhanced myelopoiesis of the BM, leukocytosis, increasing spleen  size, and circulating CD34-positive cells. The CALR (Calreticulin) gene encodes for a multifunctional  calcium-binding protein involved in many cellular activities such  as growth, proliferation, adhesion, and programmed cell death. Among patients with JAK2 negative MPNs, CALR are found in  approximately 70%                            of patients with JAK2-negative essential  thrombocythemia (ET) and 60-88% of patients with JAK2-negative  primary myelofibrosis(PMF). Only a minority of patients  (approximately 8%) with myelodysplasia have mutations in the CALR  gene. CALR mutations are rarely detected in patients with de obie  acute myeloid leukemia, chronic myelogenous leukemia, lymphoid  leukemia, or solid tumors. CALR mutations are not detected in  polycythemia and generally appear to be mutually exclusive with  JAK2 mutations and MPL mutations. The majority of mutational  changes involve a variety of insertion deletion mutations in exon  9 of the calreticulin gene: approximately 53% of all CALR  mutations are a 52 bp deletion (type-1) while the second most  prevalent mutation (approximately 32%) contains a 5 bp insertion  (type-2). Other mutations (non-type 1 or type 2) are seen in a  small minority of cases.  CALR mutations in PMF tend to be  with a favorable prognosis compared to JAK2 V617F mutations,  whereas primary myelofibrosis negative for CALR, JAK2 V617F and  MPL mutations (so-called triple negative) is associated with a  poor prognosis and shorter survival.  The MPL (myeloproliferative leukemia virus oncogene) gene encodes  the thrombopoietin receptor which regulates hematopoiesis and  megakaryopoiesis. Activating MPL mutations are associated with a  subset of myeloproliferative neoplasms and acute megakaryoblastic  leukemia. MPL W515 mutations are present in approximately 5-8% of  patients with primary myelofibrosis (PMF) and 1-4% of patients  with essential thrombocythemia (ET). The S505 mutation is  detected in patients with hereditary thrombocythemia. Limitations  This assay has a sensitivity of approximately 1% VAF for JAK2 V617F, 2.5%  VAF for other mutations in JAK2 exons 12 to 15, CALR mutations, and MPL  mutations. METHODOLOGY 11/03/2023 Comment   Final    Amplicon-based next generation sequencing. REFERENCES: 11/03/2023 Comment   Final    Comment: Teresa Santana, Ricki Alcala. Detection of  mutations in JAK2 exons 12-15 by [employer] sequencing. Int J Lab  Hematol. 2016 Feb;38(1):34-41. doi: 10.1111/ijlh. 02618. Epub 2015  Sep 11. PMID: 53342601. Skip Lacy A, Soraya R, Jessica Duke, Radha MJ, Steve Alva MM, Scott CD, Valentin Vázquez JW. The 2016 revision to  the World Health Organization classification of myeloid neoplasms  and acute leukemia. Blood. 2016 May 19;127(20):2391-405. doi:  10.1182/xhbht-4937-14-715600. Epub 2016 Apr 11. PMID: 25365087. Daniel Baig S,  Ivanna GARCIA. Mutation profile of JAK2 transcripts in patients with  chronic myeloproliferative neoplasias. J Mol Diagn. 2009  Jan;11(1):49-53.doi: 10.2353/jmoldx.2009.336569. Epub 2008 Dec 12. PMID: 54457439; PMCID: PVK0336421. NCCN Clinical Practice Guidelines in Oncology (NCCN Guidelines)  Myeloproliferative Neoplasms Version 3.2022 - August 11, 2022.   Tez Song SH, . WHO classification                            of Tumours of  Haematopoietic and Lymphoid Tissues. 4th edn. Hu Inge, [facility]:  International Agency for [employer]; 2017. Michael DIANA. Primary myelofibrosis: 2021 update on diagnosis,  risk-stratification and management. Am J Hematol. 2021 Jan;96(1):145-162. doi: 10.1002/ajh.49448. Epub 2020 Dec 2. PMID:  67562010. Gonsalo RODRIGUEZ. Genetic basis and molecular  pathophysiology of classical myeloproliferative neoplasms. Blood. 2017 Feb 9;129(6):667-679. doi: 10.1182/jsxje-9339-60-514049. Epub 2016 Dec 27. PMID: 47290125. Director Review 11/03/2023 Comment   Final    Baltazar Zayas, PhD, [facility]  Director, [facility] Oncology  [facility] for [address] [facility] and [address] [facility], [address]  [phone]    CALR RESULT 11/03/2023 Comment   Final    NEGATIVE  No insertions or deletions were detected within the analyzed region  of the calreticulin (CALR) gene. A negative result does not entirely exclude the possibility of a  clonal population carrying CALR gene mutations that are not covered  by this assay. Results should be interpreted in conjunction with  clinical and laboratory findings for the most accurate interpretation. MPL RESULT 11/03/2023 Comment   Final    NEGATIVE  No MPL mutation was identified in the provided specimen of this  individual. Results should be interpreted in conjunction with  clinical and other laboratory findings for the most accurate  interpretation. E12-15 RESULT 11/03/2023 Comment   Final    NEGATIVE  JAK2 mutations were not detected in exons 12, 13, 14 and 15. This  result does not rule out the presence of JAK2 mutation at a level  below the detection sensitivity of this assay, the presence of  other mutations outside the analyzed region of the JAK2 gene, or  the presence of a myeloproliferative or other neoplasm.  Result must  be correlated with other clinical data for the most accurate  diagnosis. Please note: This report has been generated by a voice recognition software system. Therefore there may be syntax, spelling, and/or grammatical errors. Please call if you have any questions.

## 2023-11-27 ENCOUNTER — TELEPHONE (OUTPATIENT)
Dept: HEMATOLOGY ONCOLOGY | Facility: CLINIC | Age: 33
End: 2023-11-27

## 2023-11-27 NOTE — TELEPHONE ENCOUNTER
Received return call from [facility] Calixto. Requesting last office note for review. Faxed note as requested.

## 2023-11-27 NOTE — TELEPHONE ENCOUNTER
Received vm: Teja Luna. My name is Taya Gilman. I'm calling from [facility]. I have a script that's filled out for [facility] [address] [place] [address]. YOB: 1990. If you could give me a call back, I just need to know there. The diagnosis is polycythemia, but do you have a cause or it's filled out by Felicia Vieira? Is there a cause for the polycythemia? Is this person on testosterone? Do they have sleep apnea or are they a smoker? If you could call me back with the cause of the polycythemia for [facility] [address] and date of birth [birth year]. My phone number is [phone]. That is my Direct Line. So when you have that information, if you could just give me a call and you can leave that information on my voicemail. Alright. Thank you so much. Bye, bye. Attempted to return call to Taya Rekhas. Left voice message with possible reason for polycythemia related to cardiac/pulmonary. Appts pending at last office visit. Provided direct call back number for questions.

## 2023-11-28 ENCOUNTER — HOSPITAL ENCOUNTER (OUTPATIENT)
Dept: CT IMAGING | Facility: HOSPITAL | Age: 33
Discharge: HOME | End: 2023-11-28
Payer: COMMERCIAL

## 2023-11-28 ENCOUNTER — TELEPHONE (OUTPATIENT)
Dept: HEMATOLOGY ONCOLOGY | Facility: CLINIC | Age: 33
End: 2023-11-28

## 2023-11-28 DIAGNOSIS — D58.2 ELEVATED HEMOGLOBIN (HCC): ICD-10-CM

## 2023-11-28 PROCEDURE — G1004 CDSM NDSC: HCPCS

## 2023-11-28 PROCEDURE — 71260 CT THORAX DX C+: CPT

## 2023-11-28 RX ADMIN — IOHEXOL 85 ML: 350 INJECTION, SOLUTION INTRAVENOUS at 08:18

## 2023-11-28 NOTE — TELEPHONE ENCOUNTER
Received vm: Teja Luna, this is Leatha from [employer]. I'm calling regarding Virgie Issa date of birth 1990. I just wanted to let you know our medical director did review everything and he did approve him to our medical doctor approved him to come in to be evaluated. So I'll schedule an appointment with [facility]. I'll try to anyway because I just called and I could not leave a message because it said the voicemail was full. So I don't know if you have another phone number for him. So I was not like I said, I wasn't able to leave a message but if you have another phone number, if you could give me a call and let me know. But also too with our medical director One if and when we do make an appointment with [facility], we'll schedule him here in [facility] and then our medical director will just do an evaluation in the day of just to make sure that he meets all the criteria if he has the cardiac issues. And so if there's any issue then I will let know. But if you could call me back and let me know if you have an alternate number for [facility], my number is [phone]. Jacqueline, eddie. Bye. Attempted to return call to Edilberto. Left voice message with direct call back number and provided patients contact number.

## 2023-12-18 ENCOUNTER — CONSULT (OUTPATIENT)
Dept: CARDIOLOGY CLINIC | Facility: CLINIC | Age: 33
End: 2023-12-18
Payer: COMMERCIAL

## 2023-12-18 VITALS
WEIGHT: 253.2 LBS | BODY MASS INDEX: 33.56 KG/M2 | SYSTOLIC BLOOD PRESSURE: 126 MMHG | HEIGHT: 73 IN | DIASTOLIC BLOOD PRESSURE: 86 MMHG | HEART RATE: 67 BPM

## 2023-12-18 DIAGNOSIS — R00.2 PALPITATIONS: ICD-10-CM

## 2023-12-18 DIAGNOSIS — D58.2 ELEVATED HEMOGLOBIN (HCC): ICD-10-CM

## 2023-12-18 DIAGNOSIS — R06.09 DOE (DYSPNEA ON EXERTION): Primary | ICD-10-CM

## 2023-12-18 PROCEDURE — 99204 OFFICE O/P NEW MOD 45 MIN: CPT | Performed by: INTERNAL MEDICINE

## 2023-12-18 PROCEDURE — 93000 ELECTROCARDIOGRAM COMPLETE: CPT | Performed by: INTERNAL MEDICINE

## 2023-12-18 NOTE — PROGRESS NOTES
Cardiology             Kennedy Carter  1990  [id number]              Assessment/Plan:    Palpitations  Elevated hemoglobin        Patient somewhat of a suboptimal historian, has difficulty giving specific details about his palpitations.  He seems to have multiple complaints and types of palpitations.  1 type which makes him cough, another where his heart will beat fast and he may or may not feel lightheaded/dizzy at the time.  He also complains of shortness of breath which has been somewhat chronic.  He had a least 1 episode of palpitations while jogging.  Twelve-lead ECG and cardiac examination unremarkable.  Will schedule II week Zio monitor, treadmill exercise stress test, and echocardiogram for further evaluation.  If no symptoms during monitoring, 30-day event recorder or loop recorder implantation may be reasonable.        Follow-up in 1 month        Interval History:     This is a 33-year-old male with no prior cardiac history.  He was recently seen by hematology for elevated hemoglobin levels.  He had complaints of palpitations, prompting cardiology consultation.    This palpitations started in early 2022 at which time his PCP had ordered a Holter monitor.  The study was unremarkable, revealing rare PVCs and PACs with no evidence of tachyarrhythmia or bradycardia arrhythmia.  His symptom diary was not returned.    He tells me he has been having palpitations since 2022.  His initial episode occurred while he was jogging.  Thereafter he has had a couple of episodes with exercise although cannot give me any details.  He also feels an occasional palpitations while simply at rest which makes him cough.  Other times he has felt his heart racing which may be associated with lightheadedness or dizziness during those times which she calls vertigo.  He is a suboptimal historian, unable to tell me the precise frequency of his palpitations.  He states his last episode was about 2 weeks ago when he was driving out  "of [place], with no associated symptoms at that time.  Shortness of breath with exertion or exercise which she states has been ongoing for some time although cannot give me any further details.  He denies chest discomfort.  He denies any family history of premature CAD, sudden death, or congenital cardiac abnormalities.               Social History:    Non-smoker, no significant alcohol use, no drug use          Vitals:  Vitals:    12/18/23 1355   BP: 126/86   BP Location: Right arm   Patient Position: Sitting   Cuff Size: Large   Pulse: 67   Weight: 115 kg (253 lb 3.2 oz)   Height: 6' 1\" (1.854 m)         Past Medical History:   Diagnosis Date   • GERD (gastroesophageal reflux disease)    • Headache(784.0) 12/27/2021    Headachs   • Hyperlipidemia    • Hypertension    • Hypothyroid    • Irregular heart beat    • Thyroid disorder    • Varicose vein of leg    • Vertigo      Social History     Socioeconomic History   • Marital status: /Civil Union     Spouse name: Not on file   • Number of children: Not on file   • Years of education: Not on file   • Highest education level: Not on file   Occupational History   • Not on file   Tobacco Use   • Smoking status: Never   • Smokeless tobacco: Never   Vaping Use   • Vaping status: Never Used   Substance and Sexual Activity   • Alcohol use: Not Currently     Alcohol/week: 0.0 standard drinks of alcohol     Comment: socially   • Drug use: Not Currently   • Sexual activity: Yes     Partners: Male   Other Topics Concern   • Not on file   Social History Narrative   • Not on file     Social Determinants of Health     Financial Resource Strain: Not on file   Food Insecurity: Not on file   Transportation Needs: Not on file   Physical Activity: Not on file   Stress: Not on file   Social Connections: Not on file   Intimate Partner Violence: Not on file   Housing Stability: Not on file      Family History   Problem Relation Age of Onset   • Hypothyroidism Mother    • BARBARA " disease Mother    • Hypertension Father    • Heart disease Maternal Grandfather    • Heart disease Paternal Grandfather    • Arthritis Family    • Heart disease Family    • Hypertension Family      Past Surgical History:   Procedure Laterality Date   • PILONIDAL CYST EXCISION     • WISDOM TOOTH EXTRACTION         Current Outpatient Medications:   •  cholecalciferol (VITAMIN D3) 1,000 units tablet, Take 1,000 Units by mouth daily, Disp: , Rfl:   •  levothyroxine 75 mcg tablet, Take 1 tablet (75 mcg total) by mouth daily, Disp: 90 tablet, Rfl: 0  •  triamcinolone (KENALOG) 0.1 % ointment, Apply 1 application. topically 2 (two) times a day As needed, Disp: , Rfl:         Review of Systems:  Review of Systems   Respiratory:  Positive for shortness of breath.    Cardiovascular:  Positive for palpitations.   All other systems reviewed and are negative.        Physical Exam:  Physical Exam  Constitutional:       General: He is not in acute distress.     Appearance: He is well-developed. He is not diaphoretic.   HENT:      Head: Normocephalic and atraumatic.   Eyes:      General: No scleral icterus.        Right eye: No discharge.      Pupils: Pupils are equal, round, and reactive to light.   Neck:      Thyroid: No thyromegaly.   Cardiovascular:      Rate and Rhythm: Normal rate and regular rhythm.      Heart sounds: Normal heart sounds. No murmur heard.     No friction rub. No gallop.   Pulmonary:      Effort: Pulmonary effort is normal.      Breath sounds: Normal breath sounds.   Abdominal:      General: There is no distension.      Tenderness: There is no abdominal tenderness. There is no guarding or rebound.   Musculoskeletal:         General: Normal range of motion.      Cervical back: Normal range of motion and neck supple.   Skin:     General: Skin is warm and dry.      Coloration: Skin is not pale.      Findings: No erythema or rash.   Neurological:      Mental Status: He is alert and oriented to person, place, and  time.      Coordination: Coordination normal.      Gait: Gait abnormal.   Psychiatric:         Behavior: Behavior normal.         Thought Content: Thought content normal.         Judgment: Judgment normal.         This note was completed in part utilizing M-Modal Fluency Direct Software.  Grammatical errors, random word insertions, spelling mistakes, and incomplete sentences can be an occasional consequence of this system secondary to software limitations, ambient noise, and hardware issues.  If you have any questions or concerns about the content, text, or information contained within the body of this dictation, please contact the provider for clarification.

## 2024-01-08 ENCOUNTER — HOSPITAL ENCOUNTER (OUTPATIENT)
Dept: NON INVASIVE DIAGNOSTICS | Facility: CLINIC | Age: 34
Discharge: HOME | End: 2024-01-08
Payer: COMMERCIAL

## 2024-01-08 VITALS
BODY MASS INDEX: 34.27 KG/M2 | SYSTOLIC BLOOD PRESSURE: 142 MMHG | WEIGHT: 253 LBS | OXYGEN SATURATION: 100 % | DIASTOLIC BLOOD PRESSURE: 88 MMHG | HEART RATE: 73 BPM | HEIGHT: 72 IN

## 2024-01-08 VITALS
SYSTOLIC BLOOD PRESSURE: 126 MMHG | BODY MASS INDEX: 33.53 KG/M2 | HEART RATE: 67 BPM | WEIGHT: 253 LBS | DIASTOLIC BLOOD PRESSURE: 86 MMHG | HEIGHT: 73 IN

## 2024-01-08 DIAGNOSIS — R00.2 PALPITATIONS: ICD-10-CM

## 2024-01-08 DIAGNOSIS — R06.09 DOE (DYSPNEA ON EXERTION): ICD-10-CM

## 2024-01-08 LAB
AORTIC ROOT: 3.2 CM
AORTIC VALVE MEAN VELOCITY: 6.5 M/S
APICAL FOUR CHAMBER EJECTION FRACTION: 54 %
ASCENDING AORTA: 2.6 CM
AV LVOT MEAN GRADIENT: 1 MMHG
AV LVOT PEAK GRADIENT: 3 MMHG
AV MEAN GRADIENT: 2 MMHG
AV PEAK GRADIENT: 4 MMHG
AV VELOCITY RATIO: 0.82
DOP CALC AO PEAK VEL: 0.98 M/S
DOP CALC AO VTI: 19.4 CM
DOP CALC LVOT PEAK VEL VTI: 16.64 CM
DOP CALC LVOT PEAK VEL: 0.8 M/S
E WAVE DECELERATION TIME: 126 MS
E/A RATIO: 1.46
FRACTIONAL SHORTENING: 30 (ref 28–44)
INTERVENTRICULAR SEPTUM IN DIASTOLE (PARASTERNAL SHORT AXIS VIEW): 0.9 CM
INTERVENTRICULAR SEPTUM: 0.9 CM (ref 0.6–1.1)
IVC: 16 MM
LAAS-AP2: 13.3 CM2
LAAS-AP4: 13.2 CM2
LEFT ATRIUM SIZE: 4.1 CM
LEFT ATRIUM VOLUME (MOD BIPLANE): 34 ML
LEFT ATRIUM VOLUME INDEX (MOD BIPLANE): 14.3 ML/M2
LEFT INTERNAL DIMENSION IN SYSTOLE: 3.5 CM (ref 2.1–4)
LEFT VENTRICULAR INTERNAL DIMENSION IN DIASTOLE: 5 CM (ref 3.5–6)
LEFT VENTRICULAR POSTERIOR WALL IN END DIASTOLE: 0.9 CM
LEFT VENTRICULAR STROKE VOLUME: 67 ML
LVSV (TEICH): 67 ML
MAX HR PERCENT: 98 %
MAX HR: 184 BPM
MV E'TISSUE VEL-SEP: 10 CM/S
MV PEAK A VEL: 0.61 M/S
MV PEAK E VEL: 89 CM/S
MV STENOSIS PRESSURE HALF TIME: 37 MS
MV VALVE AREA P 1/2 METHOD: 5.95
RA PRESSURE ESTIMATED: 3 MMHG
RATE PRESSURE PRODUCT: NORMAL
RIGHT VENTRICLE ID DIMENSION: 3.3 CM
RV PSP: 22 MMHG
SL CV LEFT ATRIUM LENGTH A2C: 4 CM
SL CV LV EF: 55
SL CV PED ECHO LEFT VENTRICLE DIASTOLIC VOLUME (MOD BIPLANE) 2D: 117 ML
SL CV PED ECHO LEFT VENTRICLE SYSTOLIC VOLUME (MOD BIPLANE) 2D: 50 ML
SL CV STRESS RECOVERY BP: NORMAL MMHG
SL CV STRESS RECOVERY HR: 109 BPM
SL CV STRESS RECOVERY O2 SAT: 99 %
SL CV STRESS STAGE REACHED: 4
STRESS ANGINA INDEX: 1
STRESS BASELINE BP: NORMAL MMHG
STRESS BASELINE HR: 73 BPM
STRESS O2 SAT REST: 100 %
STRESS PEAK HR: 181 BPM
STRESS POST ESTIMATED WORKLOAD: 11.7 METS
STRESS POST EXERCISE DUR MIN: 9 MIN
STRESS POST EXERCISE DUR SEC: 31 SEC
STRESS POST O2 SAT PEAK: 98 %
STRESS POST PEAK BP: 158 MMHG
TR MAX PG: 19 MMHG
TR PEAK VELOCITY: 2.2 M/S
TRICUSPID ANNULAR PLANE SYSTOLIC EXCURSION: 2 CM
TRICUSPID VALVE PEAK REGURGITATION VELOCITY: 2.19 M/S

## 2024-01-08 PROCEDURE — 93306 TTE W/DOPPLER COMPLETE: CPT

## 2024-01-08 PROCEDURE — 93306 TTE W/DOPPLER COMPLETE: CPT | Performed by: INTERNAL MEDICINE

## 2024-01-08 PROCEDURE — 93017 CV STRESS TEST TRACING ONLY: CPT

## 2024-01-08 PROCEDURE — 93018 CV STRESS TEST I&R ONLY: CPT | Performed by: INTERNAL MEDICINE

## 2024-01-08 PROCEDURE — 93016 CV STRESS TEST SUPVJ ONLY: CPT | Performed by: INTERNAL MEDICINE

## 2024-01-09 LAB
CHEST PAIN STATEMENT: NORMAL
MAX DIASTOLIC BP: 100 MMHG
MAX PREDICTED HEART RATE: 186 BPM
PROTOCOL NAME: NORMAL
REASON FOR TERMINATION: NORMAL
STRESS POST EXERCISE DUR MIN: 9 MIN
STRESS POST EXERCISE DUR SEC: 31 SEC
STRESS POST PEAK HR: 184 BPM
STRESS POST PEAK SYSTOLIC BP: 160 MMHG
TARGET HR FORMULA: NORMAL
TEST INDICATION: NORMAL

## 2024-01-12 DIAGNOSIS — E03.9 ACQUIRED HYPOTHYROIDISM: ICD-10-CM

## 2024-01-12 RX ORDER — LEVOTHYROXINE SODIUM 0.07 MG/1
75 TABLET ORAL DAILY
Qty: 90 TABLET | Refills: 1 | Status: SHIPPED | OUTPATIENT
Start: 2024-01-12

## 2024-01-16 ENCOUNTER — CLINICAL SUPPORT (OUTPATIENT)
Dept: CARDIOLOGY CLINIC | Facility: CLINIC | Age: 34
End: 2024-01-16
Payer: COMMERCIAL

## 2024-01-16 DIAGNOSIS — R00.2 PALPITATIONS: ICD-10-CM

## 2024-01-16 PROCEDURE — 93244 EXT ECG>48HR<7D REV&INTERPJ: CPT | Performed by: INTERNAL MEDICINE

## 2024-02-02 ENCOUNTER — TELEPHONE (OUTPATIENT)
Dept: HEMATOLOGY ONCOLOGY | Facility: CLINIC | Age: 34
End: 2024-02-02

## 2024-02-02 DIAGNOSIS — R00.2 PALPITATIONS: ICD-10-CM

## 2024-02-02 DIAGNOSIS — E03.9 ACQUIRED HYPOTHYROIDISM: ICD-10-CM

## 2024-02-02 DIAGNOSIS — D58.2 ELEVATED HEMOGLOBIN (HCC): Primary | ICD-10-CM

## 2024-02-02 NOTE — TELEPHONE ENCOUNTER
LVM with labs and appointment reminder.    Advised patient that blood to check testosterone levels must be collected between the hours of 7AM-9AM for accuracy.    Lab orders on file, lab hours and [facility] number provided [phone].

## 2024-02-03 ENCOUNTER — APPOINTMENT (OUTPATIENT)
Dept: LAB | Age: 34
End: 2024-02-03
Payer: COMMERCIAL

## 2024-02-03 DIAGNOSIS — E03.9 ACQUIRED HYPOTHYROIDISM: ICD-10-CM

## 2024-02-03 DIAGNOSIS — R00.2 PALPITATIONS: ICD-10-CM

## 2024-02-03 DIAGNOSIS — D58.2 ELEVATED HEMOGLOBIN (HCC): ICD-10-CM

## 2024-02-03 LAB
ALBUMIN SERPL BCP-MCNC: 4.3 G/DL (ref 3.5–5)
ALP SERPL-CCNC: 81 U/L (ref 34–104)
ALT SERPL W P-5'-P-CCNC: 31 U/L (ref 7–52)
ANION GAP SERPL CALCULATED.3IONS-SCNC: 7 MMOL/L
AST SERPL W P-5'-P-CCNC: 19 U/L (ref 13–39)
BASOPHILS # BLD AUTO: 0.05 THOUSANDS/ÂΜL (ref 0–0.1)
BASOPHILS NFR BLD AUTO: 1 % (ref 0–1)
BILIRUB SERPL-MCNC: 0.96 MG/DL (ref 0.2–1)
BUN SERPL-MCNC: 10 MG/DL (ref 5–25)
CALCIUM SERPL-MCNC: 9.5 MG/DL (ref 8.4–10.2)
CHLORIDE SERPL-SCNC: 101 MMOL/L (ref 96–108)
CO2 SERPL-SCNC: 31 MMOL/L (ref 21–32)
CREAT SERPL-MCNC: 1.17 MG/DL (ref 0.6–1.3)
EOSINOPHIL # BLD AUTO: 0.2 THOUSAND/ÂΜL (ref 0–0.61)
EOSINOPHIL NFR BLD AUTO: 2 % (ref 0–6)
ERYTHROCYTE [DISTWIDTH] IN BLOOD BY AUTOMATED COUNT: 11.9 % (ref 11.6–15.1)
GFR SERPL CREATININE-BSD FRML MDRD: 80 ML/MIN/1.73SQ M
GLUCOSE P FAST SERPL-MCNC: 86 MG/DL (ref 65–99)
HCT VFR BLD AUTO: 51.2 % (ref 36.5–49.3)
HGB BLD-MCNC: 17.6 G/DL (ref 12–17)
IMM GRANULOCYTES # BLD AUTO: 0.03 THOUSAND/UL (ref 0–0.2)
IMM GRANULOCYTES NFR BLD AUTO: 0 % (ref 0–2)
LYMPHOCYTES # BLD AUTO: 3.84 THOUSANDS/ÂΜL (ref 0.6–4.47)
LYMPHOCYTES NFR BLD AUTO: 46 % (ref 14–44)
MCH RBC QN AUTO: 29 PG (ref 26.8–34.3)
MCHC RBC AUTO-ENTMCNC: 34.4 G/DL (ref 31.4–37.4)
MCV RBC AUTO: 85 FL (ref 82–98)
MONOCYTES # BLD AUTO: 0.68 THOUSAND/ÂΜL (ref 0.17–1.22)
MONOCYTES NFR BLD AUTO: 8 % (ref 4–12)
NEUTROPHILS # BLD AUTO: 3.55 THOUSANDS/ÂΜL (ref 1.85–7.62)
NEUTS SEG NFR BLD AUTO: 43 % (ref 43–75)
NRBC BLD AUTO-RTO: 0 /100 WBCS
PLATELET # BLD AUTO: 303 THOUSANDS/UL (ref 149–390)
PMV BLD AUTO: 9.4 FL (ref 8.9–12.7)
POTASSIUM SERPL-SCNC: 4.1 MMOL/L (ref 3.5–5.3)
PROT SERPL-MCNC: 7.2 G/DL (ref 6.4–8.4)
RBC # BLD AUTO: 6.06 MILLION/UL (ref 3.88–5.62)
SODIUM SERPL-SCNC: 139 MMOL/L (ref 135–147)
TESTOST SERPL-MSCNC: 398 NG/DL
WBC # BLD AUTO: 8.35 THOUSAND/UL (ref 4.31–10.16)

## 2024-02-05 ENCOUNTER — OFFICE VISIT (OUTPATIENT)
Dept: HEMATOLOGY ONCOLOGY | Facility: CLINIC | Age: 34
End: 2024-02-05
Payer: COMMERCIAL

## 2024-02-05 VITALS
TEMPERATURE: 97.2 F | RESPIRATION RATE: 16 BRPM | SYSTOLIC BLOOD PRESSURE: 124 MMHG | DIASTOLIC BLOOD PRESSURE: 72 MMHG | HEIGHT: 73 IN | OXYGEN SATURATION: 98 % | HEART RATE: 90 BPM | BODY MASS INDEX: 34.19 KG/M2 | WEIGHT: 258 LBS

## 2024-02-05 DIAGNOSIS — R00.2 PALPITATIONS: ICD-10-CM

## 2024-02-05 DIAGNOSIS — D58.2 ELEVATED HEMOGLOBIN (HCC): Primary | ICD-10-CM

## 2024-02-05 PROCEDURE — 99215 OFFICE O/P EST HI 40 MIN: CPT | Performed by: PHYSICIAN ASSISTANT

## 2024-02-05 NOTE — PATIENT INSTRUCTIONS
[facility] Medical Oncology and Hematology Team  [place] - [phone]    Your Team Members:  Advanced Practitioner:  Nicolasa Hernandez PA-C  Oncology Nurse:   DANIELLE Luna ([phone]) M-F 8am - 4:30pm    Please answer Private and Unavailable Calls - this may be your team(s) contacting you.  If you have medical questions/concerns/issues - contact us either by (1) My Chart (2) [facility]

## 2024-02-05 NOTE — PROGRESS NOTES
[address]  [facility] [place]  [address]  [facility] [address]  Hematology Ambulatory Follow-Up  Kennedy Carter, 1990, [id number]  2/5/2024      Assessment and Plan   1. Elevated hemoglobin (HCC)  Baseline hemoglobin and hematocrit going back to 2018 demonstrated a hematocrit above 50%    Patient is presenting with a hemogram between 52 and 53% with symptoms including elevated blood pressure, dizziness and palpitations.    Patient was referred to cardiology-no arrhythmias found, no abnormalities on echocardiogram stress test was within normal limits however chest pain was reproducible and resolved spontaneously.    His testosterone was checked and is in the acceptable range for normal supplementation for 29-year-old male.  JAK2 mutational testing, CALR, JAK2 exon mutation and MPL were negative.  Carboxyhemoglobin was mildly elevated at 2.1%, patient does not smoke/vape.  Patient does not have gas heat in his house.   November 2023 CT scan of the chest was benign no mediastinal masses or pulmonary masses.  Abdominal ultrasounds completed previously did not demonstrate any abnormalities of the right upper quadrant, urinalysis was negative.  MRI of brain without and with contrast did not demonstrate any vascular abnormalities this was completed in November and December 2022.    I reviewed the patient's diagnostic workup previously.  Patient's hematocrit still remains moderately elevated.  Patient will undergo bone marrow biopsy to rule out bone marrow abnormalities that could be is causing the above change however, if bone marrow biopsy is negative this is not a hematologic condition and is likely secondary to something else.  Sleep study is still pending.  Patient is no longer spending time traveling in the [place] and has not been to [place] and now approximately 2 years.    The patient is scheduled for follow-up in approximately 5-6 weeks.     Patient  voiced agreement and understanding to the above.   Patient advised to call the Hematology/Oncology office with any questions and concerns regarding the above.    Barrier(s) to care: None  The patient is able to self care.    Nicolasa Hernandez PA-C  Medical Oncology/Hematology  [facility]    Subjective     Chief Complaint   Patient presents with    Follow-up       History of present illness:   This is a 33-year-old male with past medical history of hypertension, altitude sickness with frequent trips to [facility], hypertension, hypothyroidism on supplement, hyperlipidemia, varicosities of the lower extremity, palpitations who presents to the hematology clinic for evaluation of erythrocytosis and polycythemia.     Patient notes that he has been told that his hemoglobin has been elevated in the past.  However, most the time this was discovered it was blamed on recent travel since the patient had been making frequent trips to [facility].  Patient notes that at his first visit to [facility] he had a significant episode of altitude sickness which he states hypertension has stemmed from.  This episode occurred in January 2022 patient notes that he was given drops that he ended up overdosing on and having to go to the hospital for additional attention and steroids.     She has long history of palpitations dating back to over 5 years ago.  Timeline is somewhat skewed patient does not deliver history in the sense of dates that he can relate to me however, it seems that the patient has had on and off palpitations throughout the past 5 years.  Previous Holter monitoring has been collected to assess for this.  Patient does not have a specific diagnosis for arrhythmia.  Patient notes that when he does experience this he does not always go to the hospital.  We discussed today that that is imperative to diagnosis.     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  Patient does note that in the past he has been on testosterone supplements but this was over 5 years ago.  Patient does not know if the preworkout has any testosterone builder in it which also can cause elevation of hemoglobin .  Patient denies pains in the legs however he does note that varicose veins of the lower extremities seem to have worsened over the past several months.     No history of MI, CVA or DVT/PE.     Hematologic trends:  8/18/2018 RBC 5.9, hemoglobin 17.9, hematocrit 50.2, MCV 84, platelet count 241, WBC 8.5  TSH 7.3  6/23/2021 TSH 5.3  1/18/2022 RBC 5.8, hemoglobin 17.5, hematocrit 49.6, MCV 85, RDW 12.8  1/27/2022 RBC 6.3, hemoglobin 18.9, hematocrit 54.5, MCV 85, platelet 254, WBC 4.5  10/6/2022 RBC 5.8, hemoglobin 17.1, hematocrit 50.0, MCV 85, platelet count 275, WBC 6.9     9/18/2023 RBC 6.0, hemoglobin 17.9, hematocrit 53.5, MCV 88, platelet count 264, WBC 7.1.  TSH 4.5     10/16/2023: WBC 7.7, hemoglobin 18.2, HCT 52.4, MCV 85, platelet count 264  Erythropoietin 6.8, BUN and creatinine within normal limits with EGFR of 73, LFTs WNL, negative urinalysis no occult blood, carboxyhemoglobin at 2.1%  TSH 2.89    11/3/2023: JAK2 V6 17 F-, Shan exon mutations 12 through 15 negative, MPL negative, JOVAN are negative    11/28/2023: CT of the chest did not demonstrate acute cardiopulmonary processes and no mediastinal masses.    2/3/2024: WBC 8.3, hemoglobin 17.6, hematocrit 51.2%, MCV 85, platelets 303.   Creatinine 1.17, BUN 10, AST and ALT WNL, calcium 9.5   Testosterone 398    Interval history:  feeling better than before, not making cross country trips.    Review of Systems   Constitutional:  Positive for fatigue. Negative for activity change, appetite change and fever.   HENT:  Negative for nosebleeds.    Respiratory:  Negative for cough, choking and shortness of breath.    Cardiovascular:  Positive for palpitations. Negative for chest pain  and leg swelling.   Gastrointestinal:  Negative for abdominal distention, abdominal pain, anal bleeding, blood in stool, constipation, diarrhea, nausea and vomiting.   Endocrine: Negative for cold intolerance.   Genitourinary:  Negative for hematuria.   Musculoskeletal:  Negative for myalgias.   Skin:  Negative for color change, pallor and rash.   Allergic/Immunologic: Negative for immunocompromised state.   Neurological:  Negative for headaches.   Hematological:  Negative for adenopathy. Does not bruise/bleed easily.   All other systems reviewed and are negative.      Patient Active Problem List   Diagnosis    Hypothyroidism    Gastroesophageal reflux disease without esophagitis    Palpitations    Tinnitus of both ears    Mixed hyperlipidemia    Vitamin D deficiency    Hypercalcemia    Insomnia    Chronic nonintractable headache    Polycythemia    Varicose veins of both lower extremities    GAGANDEEP (generalized anxiety disorder)    Encounter for annual physical exam    Class 2 obesity due to excess calories without serious comorbidity with body mass index (BMI) of 35.0 to 35.9 in adult    Frothy urine     Past Medical History:   Diagnosis Date    GERD (gastroesophageal reflux disease)     Headache(784.0) 12/27/2021    Headachs    Hyperlipidemia     Hypertension     Hypothyroid     Irregular heart beat     Thyroid disorder     Varicose vein of leg     Vertigo      Past Surgical History:   Procedure Laterality Date    PILONIDAL CYST EXCISION      WISDOM TOOTH EXTRACTION       Family History   Problem Relation Age of Onset    Hypothyroidism Mother     BARBARA disease Mother     Hypertension Father     Heart disease Maternal Grandfather     Heart disease Paternal Grandfather     Arthritis Family     Heart disease Family     Hypertension Family      Social History     Socioeconomic History    Marital status: /Civil Union     Spouse name: None    Number of children: None    Years of education: None    Highest education  "level: None   Occupational History    None   Tobacco Use    Smoking status: Never    Smokeless tobacco: Never   Vaping Use    Vaping status: Never Used   Substance and Sexual Activity    Alcohol use: Not Currently     Alcohol/week: 0.0 standard drinks of alcohol     Comment: socially    Drug use: Not Currently    Sexual activity: Yes     Partners: Male   Other Topics Concern    None   Social History Narrative    None     Social Determinants of Health     Financial Resource Strain: Not on file   Food Insecurity: Not on file   Transportation Needs: Not on file   Physical Activity: Not on file   Stress: Not on file   Social Connections: Not on file   Intimate Partner Violence: Not on file   Housing Stability: Not on file       Current Outpatient Medications:     cholecalciferol (VITAMIN D3) 1,000 units tablet, Take 1,000 Units by mouth daily, Disp: , Rfl:     levothyroxine 75 mcg tablet, Take 1 tablet (75 mcg total) by mouth daily, Disp: 90 tablet, Rfl: 1    triamcinolone (KENALOG) 0.1 % ointment, Apply 1 application. topically 2 (two) times a day As needed (Patient not taking: Reported on 2/5/2024), Disp: , Rfl:   Allergies   Allergen Reactions    Penicillins Hives    Latex Rash       Objective   /72 (BP Location: Right arm, Patient Position: Sitting, Cuff Size: Adult)   Pulse 90   Temp (!) 97.2 °F (36.2 °C)   Resp 16   Ht 6' 1\" (1.854 m)   Wt 117 kg (258 lb)   SpO2 98%   BMI 34.04 kg/m²    Physical Exam  Constitutional:       General: He is not in acute distress.     Appearance: He is well-developed.   HENT:      Head: Normocephalic and atraumatic.      Right Ear: External ear normal.      Left Ear: External ear normal.      Nose: Nose normal.   Eyes:      General: No scleral icterus.     Conjunctiva/sclera: Conjunctivae normal.   Cardiovascular:      Rate and Rhythm: Normal rate.   Pulmonary:      Effort: No respiratory distress.   Abdominal:      General: There is no distension.      Palpations: " Abdomen is soft.   Skin:     Findings: No rash (on exposed skin.).   Neurological:      Mental Status: He is alert and oriented to person, place, and time.   Psychiatric:         Thought Content: Thought content normal.         Result Review  Labs:  No visits with results within 3 Week(s) from this visit.   Latest known visit with results is:   Hospital Outpatient Visit on 01/08/2024   Component Date Value Ref Range Status    Baseline HR 01/08/2024 73  bpm Final    Baseline BP 01/08/2024 142/88  mmHg Final    O2 sat rest 01/08/2024 100  % Final    Stress peak HR 01/08/2024 181  bpm Final    Post peak BP 01/08/2024 158  mmHg Final    O2 sat peak 01/08/2024 98  % Final    Recovery HR 01/08/2024 109  bpm Final    Recovery BP 01/08/2024 138/70  mmHg Final    O2 sat recovery 01/08/2024 99  % Final    Max HR 01/08/2024 184  bpm Final    Max HR Percent 01/08/2024 98  % Final    Exercise duration (min) 01/08/2024 9  min Final    Exercise duration (sec) 01/08/2024 31  sec Final    Estimated workload 01/08/2024 11.7  METS Final    Rate Pressure Product 01/08/2024 28,598.0   Final    Angina Index 01/08/2024 1   Final    Stress Stage Reached 01/08/2024 4.0   Final    Protocol Name 01/08/2024 LILIANA   Final    Exercise duration (min) 01/08/2024 9  min Final    Exercise duration (sec) 01/08/2024 31  sec Final    Post Peak Systolic BP 01/08/2024 160  mmHg Final    Max Diastolic Bp 01/08/2024 100  mmHg Final    Peak HR 01/08/2024 184  BPM Final    Max Predicted Heart Rate 01/08/2024 186  BPM Final    Reason for Termination 01/08/2024 Fatigue   Final    Test Indication 01/08/2024 Screening for CAD   Final    Target Hr Formular 01/08/2024 (220 - Age)*100%   Final    Chest Pain Statement 01/08/2024 none   Final       Please note:  This report has been generated by a voice recognition software system. Therefore there may be syntax, spelling, and/or grammatical errors. Please call if you have any questions.

## 2024-02-13 RX ORDER — SODIUM CHLORIDE 9 MG/ML
75 INJECTION, SOLUTION INTRAVENOUS CONTINUOUS
Status: CANCELLED | OUTPATIENT
Start: 2024-02-13

## 2024-02-15 ENCOUNTER — TELEPHONE (OUTPATIENT)
Dept: RADIOLOGY | Facility: HOSPITAL | Age: 34
End: 2024-02-15

## 2024-02-27 ENCOUNTER — TELEPHONE (OUTPATIENT)
Dept: NEUROLOGY | Facility: CLINIC | Age: 34
End: 2024-02-27

## 2024-03-06 ENCOUNTER — HOSPITAL ENCOUNTER (OUTPATIENT)
Dept: INTERVENTIONAL RADIOLOGY/VASCULAR | Facility: HOSPITAL | Age: 34
Discharge: HOME | End: 2024-03-06
Payer: COMMERCIAL

## 2024-03-06 VITALS
OXYGEN SATURATION: 97 % | DIASTOLIC BLOOD PRESSURE: 78 MMHG | HEART RATE: 75 BPM | SYSTOLIC BLOOD PRESSURE: 120 MMHG | RESPIRATION RATE: 18 BRPM | TEMPERATURE: 98 F

## 2024-03-06 DIAGNOSIS — D58.2 ELEVATED HEMOGLOBIN (HCC): ICD-10-CM

## 2024-03-06 LAB
ERYTHROCYTE [DISTWIDTH] IN BLOOD BY AUTOMATED COUNT: 12.2 % (ref 11.6–15.1)
HCT VFR BLD AUTO: 51.7 % (ref 36.5–49.3)
HGB BLD-MCNC: 18.1 G/DL (ref 12–17)
MCH RBC QN AUTO: 29.3 PG (ref 26.8–34.3)
MCHC RBC AUTO-ENTMCNC: 35 G/DL (ref 31.4–37.4)
MCV RBC AUTO: 84 FL (ref 82–98)
NRBC BLD AUTO-RTO: 0 /100 WBCS
PLATELET # BLD AUTO: 329 THOUSANDS/UL (ref 149–390)
PMV BLD AUTO: 8.6 FL (ref 8.9–12.7)
RBC # BLD AUTO: 6.17 MILLION/UL (ref 3.88–5.62)
WBC # BLD AUTO: 6.62 THOUSAND/UL (ref 4.31–10.16)

## 2024-03-06 PROCEDURE — 38222 DX BONE MARROW BX & ASPIR: CPT

## 2024-03-06 PROCEDURE — 99152 MOD SED SAME PHYS/QHP 5/>YRS: CPT

## 2024-03-06 PROCEDURE — 88237 TISSUE CULTURE BONE MARROW: CPT | Performed by: PHYSICIAN ASSISTANT

## 2024-03-06 PROCEDURE — 88189 FLOWCYTOMETRY/READ 16 & >: CPT

## 2024-03-06 PROCEDURE — 38220 DX BONE MARROW ASPIRATIONS: CPT

## 2024-03-06 PROCEDURE — 88264 CHROMOSOME ANALYSIS 20-25: CPT

## 2024-03-06 PROCEDURE — 88184 FLOWCYTOMETRY/ TC 1 MARKER: CPT | Performed by: PHYSICIAN ASSISTANT

## 2024-03-06 PROCEDURE — 88374 M/PHMTRC ALYS ISHQUANT/SEMIQ: CPT | Performed by: PHYSICIAN ASSISTANT

## 2024-03-06 PROCEDURE — 88185 FLOWCYTOMETRY/TC ADD-ON: CPT

## 2024-03-06 PROCEDURE — 88291 CYTO/MOLECULAR REPORT: CPT

## 2024-03-06 PROCEDURE — 99153 MOD SED SAME PHYS/QHP EA: CPT

## 2024-03-06 RX ORDER — SODIUM CHLORIDE 9 MG/ML
75 INJECTION, SOLUTION INTRAVENOUS CONTINUOUS
Status: DISCONTINUED | OUTPATIENT
Start: 2024-03-06 | End: 2024-03-07 | Stop reason: HOSPADM

## 2024-03-06 RX ORDER — MIDAZOLAM HYDROCHLORIDE 2 MG/2ML
INJECTION, SOLUTION INTRAMUSCULAR; INTRAVENOUS AS NEEDED
Status: COMPLETED | OUTPATIENT
Start: 2024-03-06 | End: 2024-03-06

## 2024-03-06 RX ORDER — HYDROMORPHONE HCL/PF 1 MG/ML
0.5 SYRINGE (ML) INJECTION EVERY 2 HOUR PRN
Status: CANCELLED | OUTPATIENT
Start: 2024-03-06 | End: 2024-03-08

## 2024-03-06 RX ORDER — FENTANYL CITRATE 50 UG/ML
INJECTION, SOLUTION INTRAMUSCULAR; INTRAVENOUS AS NEEDED
Status: COMPLETED | OUTPATIENT
Start: 2024-03-06 | End: 2024-03-06

## 2024-03-06 RX ORDER — OXYCODONE HYDROCHLORIDE 10 MG/1
10 TABLET ORAL EVERY 4 HOURS PRN
Status: DISCONTINUED | OUTPATIENT
Start: 2024-03-06 | End: 2024-03-07 | Stop reason: HOSPADM

## 2024-03-06 RX ORDER — OXYCODONE HYDROCHLORIDE 10 MG/1
5 TABLET ORAL EVERY 4 HOURS PRN
Status: DISCONTINUED | OUTPATIENT
Start: 2024-03-06 | End: 2024-03-07 | Stop reason: HOSPADM

## 2024-03-06 RX ORDER — LIDOCAINE WITH 8.4% SOD BICARB 0.9%(10ML)
SYRINGE (ML) INJECTION AS NEEDED
Status: COMPLETED | OUTPATIENT
Start: 2024-03-06 | End: 2024-03-06

## 2024-03-06 RX ORDER — ACETAMINOPHEN 325 MG/1
650 TABLET ORAL EVERY 4 HOURS PRN
Status: DISCONTINUED | OUTPATIENT
Start: 2024-03-06 | End: 2024-03-07 | Stop reason: HOSPADM

## 2024-03-06 RX ADMIN — FENTANYL CITRATE 25 MCG: 50 INJECTION, SOLUTION INTRAMUSCULAR; INTRAVENOUS at 11:27

## 2024-03-06 RX ADMIN — SODIUM CHLORIDE 75 ML/HR: 0.9 INJECTION, SOLUTION INTRAVENOUS at 10:03

## 2024-03-06 RX ADMIN — MIDAZOLAM 1 MG: 1 INJECTION INTRAMUSCULAR; INTRAVENOUS at 11:14

## 2024-03-06 RX ADMIN — FENTANYL CITRATE 25 MCG: 50 INJECTION, SOLUTION INTRAMUSCULAR; INTRAVENOUS at 11:25

## 2024-03-06 RX ADMIN — MIDAZOLAM 0.5 MG: 1 INJECTION INTRAMUSCULAR; INTRAVENOUS at 11:20

## 2024-03-06 RX ADMIN — MIDAZOLAM 0.5 MG: 1 INJECTION INTRAMUSCULAR; INTRAVENOUS at 11:27

## 2024-03-06 RX ADMIN — FENTANYL CITRATE 25 MCG: 50 INJECTION, SOLUTION INTRAMUSCULAR; INTRAVENOUS at 11:20

## 2024-03-06 RX ADMIN — FENTANYL CITRATE 50 MCG: 50 INJECTION, SOLUTION INTRAMUSCULAR; INTRAVENOUS at 11:14

## 2024-03-06 RX ADMIN — MIDAZOLAM 0.5 MG: 1 INJECTION INTRAMUSCULAR; INTRAVENOUS at 11:25

## 2024-03-06 RX ADMIN — Medication 20 ML: at 11:21

## 2024-03-06 NOTE — BRIEF OP NOTE (RAD/CATH)
INTERVENTIONAL RADIOLOGY PROCEDURE NOTE    Date: 3/6/2024    Procedure: IR BIOPSY BONE MARROW     Preoperative diagnosis:   1. Elevated hemoglobin (HCC)       Postoperative diagnosis: Same.    Surgeon: Prateek Campuzano MD     Assistant: None. No qualified resident was available.    Blood loss: minimal    Specimens: core and aspirate    Findings: Successful bone marrow biopsy.    Complications: None immediate.    Anesthesia: conscious sedation

## 2024-03-06 NOTE — DISCHARGE INSTRUCTIONS
Bone Marrow Biopsy     WHAT YOU NEED TO KNOW:   A bone marrow biopsy is a procedure to remove a small amount of bone marrow from your bone. Bone marrow is the soft tissue inside your bone that helps to make blood cells. The sample is tested for disease or infection.    DISCHARGE INSTRUCTIONS:     1. Limit your activities day of biopsy as directed by your doctor.    2. Use medication as ordered.    3. Return to your normal diet.Small sips of flat soda will help with nausea.    4. Remove band-aid or dressing 24 hours after procedure.    Contact Interventional Radiology at [phone] ([facility] PATIENTS: Contact Interventional Radiology at [phone]) ([facility] PATIENTS: Contact Interventional Radiology at [phone]) if:    1. Difficulty breathing, nausea or vomiting.    2. Chills or fever above 101 F.    3. Pain at biopsy site not relieved by medication.    4. Develop any redness, swelling, heat, unusual drainage, heavy bruising or bleeding from biopsy site.        Moderate Sedation   WHAT YOU NEED TO KNOW:   Moderate sedation, or conscious sedation, is medicine used during procedures to help you feel relaxed and calm. You will be awake and able to follow directions without anxiety or pain. You will remember little to none of the procedure. You may feel tired, weak, or unsteady on your feet after you get sedation. You may also have trouble concentrating or short-term memory loss. These symptoms should go away in 24 hours or less.   DISCHARGE INSTRUCTIONS:   Call 911 or have someone else call for any of the following:   You have sudden trouble breathing.     You cannot be woken.  Seek care immediately if:   You have a severe headache or dizziness.     Your heart is beating faster than usual.  Contact your healthcare provider if:   You have a fever.     You have nausea or are vomiting for more than 8 hours after the procedure.      Your skin is itchy, swollen, or you have a rash.     You have questions or concerns  about your condition or care.  Self-care:   Have someone stay with you for 24 hours. This person can drive you to errands and help you do things around the house. This person can also watch for problems.      Rest and do quiet activities for 24 hours. Do not exercise, ride a bike, or play sports. Stand up slowly to prevent dizziness and falls. Take short walks around the house with another person. Slowly return to your usual activities the next day.      Do not drive or use dangerous machines or tools for 24 hours. You may injure yourself or others. Examples include a lawnmower, saw, or drill. Do not return to work for 24 hours if you use dangerous machines or tools for work.      Do not make important decisions for 24 hours. For example, do not sign important papers or invest money.      Drink liquids as directed. Liquids help flush the sedation medicine out of your body. Ask how much liquid to drink each day and which liquids are best for you.      Eat small, frequent meals to prevent nausea and vomiting. Start with clear liquids such as juice or broth. If you do not vomit after clear liquids, you can eat your usual foods.      Do not drink alcohol or take medicines that make you drowsy. This includes medicines that help you sleep and anxiety medicines. Ask your healthcare provider if it is safe for you to take pain medicine.  Follow up with your healthcare provider as directed: Write down your questions so you remember to ask them during your visits.   © 2017 [employer] Information is for End User's use only and may not be sold, redistributed or otherwise used for commercial purposes. All illustrations and images included in CareNotes® are the copyrighted property of [employer] or [employer].  The above information is an  only. It is not intended as medical advice for individual conditions or treatments. Talk to your doctor, nurse or pharmacist before following any  medical regimen to see if it is safe and effective for you.

## 2024-03-08 ENCOUNTER — TELEPHONE (OUTPATIENT)
Dept: HEMATOLOGY ONCOLOGY | Facility: CLINIC | Age: 34
End: 2024-03-08

## 2024-03-08 LAB — MISCELLANEOUS LAB TEST RESULT: NORMAL

## 2024-03-08 NOTE — TELEPHONE ENCOUNTER
Patient Call    Who are you speaking with? Patient    If it is not the patient, are they listed on an active communication consent form? N/A   What is the reason for this call? Pt is calling in regards to a return to work note that he needs to have completed. Pt had a bone marrow biopsy done and he was to return to work today but they will not allow him to return to work without this note. Pt would like a call back regarding this today please as his employer will not allow him to return to work without the note.    Does this require a call back? Yes   If a call back is required, please list best call back number [phone]   If a call back is required, advise that a message will be forwarded to their care team and someone will return their call as soon as possible.   Did you relay this information to the patient? Yes

## 2024-03-08 NOTE — TELEPHONE ENCOUNTER
"Patient Call    Who are you speaking with? Patient    If it is not the patient, are they listed on an active communication consent form? Yes   What is the reason for this call? Need a \"return to work form\" completed for employer   Does this require a call back? Yes   If a call back is required, please list [facility] call back number [phone]    If a call back is required, advise that a message will be forwarded to their care team and someone will return their call as soon as possible.   Did you relay this information to the patient? Yes     "

## 2024-03-11 ENCOUNTER — TELEPHONE (OUTPATIENT)
Dept: HEMATOLOGY ONCOLOGY | Facility: CLINIC | Age: 34
End: 2024-03-11

## 2024-03-11 NOTE — TELEPHONE ENCOUNTER
Patient Call    Who are you speaking with? Patient    If it is not the patient, are they listed on an active communication consent form? N/A   What is the reason for this call? Pt asked for back to work note put in his my chart    Does this require a call back? Yes   If a call back is required, please list best call back number [phone]    If a call back is required, advise that a message will be forwarded to their care team and someone will return their call as soon as possible.   Did you relay this information to the patient? Yes

## 2024-03-12 LAB
MISCELLANEOUS LAB TEST RESULT: NORMAL
MISCELLANEOUS LAB TEST RESULT: NORMAL

## 2024-03-13 ENCOUNTER — DOCUMENTATION (OUTPATIENT)
Dept: HEMATOLOGY ONCOLOGY | Facility: CLINIC | Age: 34
End: 2024-03-13

## 2024-03-13 LAB — MISCELLANEOUS LAB TEST RESULT: NORMAL

## 2024-03-13 NOTE — PROGRESS NOTES
Received Paperwork   What type of form Other (Enter type in comments)   Scanned blank form into patient's Epic chart Yes   Method received form  In Person drop off   Provider responsible for form    Informed patient our office turn around time for completing patient forms is 5-7 business days. Yes, informed patient of turn around time     Comments DOT MEDICAL EXAMINER LETTER    PATIENT NEEDS THIS FILLED OUT AND FAXED IN ASAP, CAN NOT GO BACK TO WORK WITHOUT THIS. PLEASE CALL PATIENT WHEN FAXED IN.

## 2024-03-14 ENCOUNTER — RA CDI HCC (OUTPATIENT)
Dept: OTHER | Facility: HOSPITAL | Age: 34
End: 2024-03-14

## 2024-03-14 ENCOUNTER — TELEPHONE (OUTPATIENT)
Dept: HEMATOLOGY ONCOLOGY | Facility: CLINIC | Age: 34
End: 2024-03-14

## 2024-03-14 NOTE — TELEPHONE ENCOUNTER
Patient Call    Who are you speaking with? Patient    If it is not the patient, are they listed on an active communication consent form? N/A   What is the reason for this call? Patient calling in regards to the DOT medical form that he dropped off at the office yesterday which needs to be filled out by Nicolasa Hernandez.  Patient would like to know if the paper has been filled out yet.  Patient needs the paperwork filled out before he can return to work. Patient was advised that paperwork can take 5-7 business days to be filled out.     Patient would like a call back to discuss the status of his paperwork.    Does this require a call back? Yes   If a call back is required, please list [facility] call back number [phone]   If a call back is required, advise that a message will be forwarded to their care team and someone will return their call as soon as possible.   Did you relay this information to the patient? Yes

## 2024-03-14 NOTE — PROGRESS NOTES
Paperwork Update   What type of form Other (Enter type in comments)   Method of returning completed form Fax   FMLA/Disability requested start date N/A   Date need completed form(s) by ASAP   Intermittent or Continuous FMLA?  Does not apply   If returning via fax, enter fax number See Form   Additional Comments       Employee Health Medical Clearance Form

## 2024-03-14 NOTE — TELEPHONE ENCOUNTER
Advised patient that the result of his bone marrow biopsy is still pending. The expected turnaround time will been about 1 more week per the pathologist Dr. Conte.    Nicolasa Hernandez PA-C will be able to provide a diagnosis and complete the requested from once the pathology report is final.    Patient verbalized understanding and agreement, then stated he will provide his employer with an update.

## 2024-03-19 ENCOUNTER — TELEPHONE (OUTPATIENT)
Dept: HEMATOLOGY ONCOLOGY | Facility: CLINIC | Age: 34
End: 2024-03-19

## 2024-03-19 LAB — MISCELLANEOUS LAB TEST RESULT: NORMAL

## 2024-03-19 NOTE — TELEPHONE ENCOUNTER
Patient Call    Who are you speaking with? Patient    If it is not the patient, are they listed on an active communication consent form? N/A   What is the reason for this call? 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 Yes   If a call back is required, please list [facility] call back number [phone]   If a call back is required, advise that a message will be forwarded to their care team and someone will return their call as soon as possible.   Did you relay this information to the patient? Yes

## 2024-03-19 NOTE — TELEPHONE ENCOUNTER
Attempted to phone patient. Left voice message that all results would be reviewed in detail at appt on 3/21.  Provided hope line number for call back.

## 2024-03-20 ENCOUNTER — TELEPHONE (OUTPATIENT)
Dept: HEMATOLOGY ONCOLOGY | Facility: CLINIC | Age: 34
End: 2024-03-20

## 2024-03-20 ENCOUNTER — APPOINTMENT (OUTPATIENT)
Dept: LAB | Facility: MEDICAL CENTER | Age: 34
End: 2024-03-20
Payer: COMMERCIAL

## 2024-03-20 DIAGNOSIS — D58.2 ELEVATED HEMOGLOBIN (HCC): ICD-10-CM

## 2024-03-20 LAB
BASOPHILS # BLD AUTO: 0.06 THOUSANDS/ÂΜL (ref 0–0.1)
BASOPHILS NFR BLD AUTO: 1 % (ref 0–1)
EOSINOPHIL # BLD AUTO: 0.33 THOUSAND/ÂΜL (ref 0–0.61)
EOSINOPHIL NFR BLD AUTO: 5 % (ref 0–6)
ERYTHROCYTE [DISTWIDTH] IN BLOOD BY AUTOMATED COUNT: 12.2 % (ref 11.6–15.1)
FERRITIN SERPL-MCNC: 156 NG/ML (ref 24–336)
HCT VFR BLD AUTO: 50.9 % (ref 36.5–49.3)
HGB BLD-MCNC: 17.3 G/DL (ref 12–17)
IMM GRANULOCYTES # BLD AUTO: 0.04 THOUSAND/UL (ref 0–0.2)
IMM GRANULOCYTES NFR BLD AUTO: 1 % (ref 0–2)
IRON SATN MFR SERPL: 26 % (ref 15–50)
IRON SERPL-MCNC: 93 UG/DL (ref 50–212)
LYMPHOCYTES # BLD AUTO: 2.52 THOUSANDS/ÂΜL (ref 0.6–4.47)
LYMPHOCYTES NFR BLD AUTO: 35 % (ref 14–44)
MCH RBC QN AUTO: 29.2 PG (ref 26.8–34.3)
MCHC RBC AUTO-ENTMCNC: 34 G/DL (ref 31.4–37.4)
MCV RBC AUTO: 86 FL (ref 82–98)
MONOCYTES # BLD AUTO: 0.58 THOUSAND/ÂΜL (ref 0.17–1.22)
MONOCYTES NFR BLD AUTO: 8 % (ref 4–12)
NEUTROPHILS # BLD AUTO: 3.69 THOUSANDS/ÂΜL (ref 1.85–7.62)
NEUTS SEG NFR BLD AUTO: 50 % (ref 43–75)
NRBC BLD AUTO-RTO: 0 /100 WBCS
PLATELET # BLD AUTO: 300 THOUSANDS/UL (ref 149–390)
PMV BLD AUTO: 9.2 FL (ref 8.9–12.7)
RBC # BLD AUTO: 5.92 MILLION/UL (ref 3.88–5.62)
TIBC SERPL-MCNC: 359 UG/DL (ref 250–450)
UIBC SERPL-MCNC: 266 UG/DL (ref 155–355)
WBC # BLD AUTO: 7.22 THOUSAND/UL (ref 4.31–10.16)

## 2024-03-20 PROCEDURE — 82728 ASSAY OF FERRITIN: CPT

## 2024-03-20 PROCEDURE — 36415 COLL VENOUS BLD VENIPUNCTURE: CPT

## 2024-03-20 PROCEDURE — 83540 ASSAY OF IRON: CPT

## 2024-03-20 PROCEDURE — 85025 COMPLETE CBC W/AUTO DIFF WBC: CPT

## 2024-03-20 PROCEDURE — 83550 IRON BINDING TEST: CPT

## 2024-03-20 NOTE — TELEPHONE ENCOUNTER
Call out to patient, voicemail was left regarding labs that are needed prior to follow up 3/21 with Nicolasa Hernandez PA-C.   Patient was instructed to return call to [facility] number if there were questions or patient was unable to complete labs.

## 2024-03-21 ENCOUNTER — OFFICE VISIT (OUTPATIENT)
Dept: HEMATOLOGY ONCOLOGY | Facility: CLINIC | Age: 34
End: 2024-03-21

## 2024-03-21 VITALS
DIASTOLIC BLOOD PRESSURE: 78 MMHG | SYSTOLIC BLOOD PRESSURE: 126 MMHG | HEIGHT: 73 IN | TEMPERATURE: 97.2 F | WEIGHT: 268 LBS | OXYGEN SATURATION: 100 % | HEART RATE: 84 BPM | BODY MASS INDEX: 35.52 KG/M2 | RESPIRATION RATE: 17 BRPM

## 2024-03-21 DIAGNOSIS — D58.2 ELEVATED HEMOGLOBIN (HCC): Primary | ICD-10-CM

## 2024-03-21 PROBLEM — E66.812 CLASS 2 OBESITY DUE TO EXCESS CALORIES WITHOUT SERIOUS COMORBIDITY WITH BODY MASS INDEX (BMI) OF 35.0 TO 35.9 IN ADULT: Status: RESOLVED | Noted: 2023-03-13 | Resolved: 2024-03-21

## 2024-03-21 PROBLEM — R82.998 FROTHY URINE: Status: RESOLVED | Noted: 2023-03-13 | Resolved: 2024-03-21

## 2024-03-21 PROBLEM — E66.09 CLASS 2 OBESITY DUE TO EXCESS CALORIES WITHOUT SERIOUS COMORBIDITY WITH BODY MASS INDEX (BMI) OF 35.0 TO 35.9 IN ADULT: Status: RESOLVED | Noted: 2023-03-13 | Resolved: 2024-03-21

## 2024-03-21 PROCEDURE — RECHECK: Performed by: PHYSICIAN ASSISTANT

## 2024-03-21 NOTE — PATIENT INSTRUCTIONS
[facility] Medical Oncology and Hematology Team  Hope Line - [phone]    Your Team Members:  Advanced Practitioner:  Nicolasa Hernandez PA-C  Oncology Nurse:   DANIELLE Luna ([phone]) M-F 8am - 4:30pm    Please answer Private and Unavailable Calls - this may be your team(s) contacting you.  If you have medical questions/concerns/issues - contact us either by (1) My Chart (2) Hope Line

## 2024-03-22 ENCOUNTER — TELEPHONE (OUTPATIENT)
Dept: HEMATOLOGY ONCOLOGY | Facility: CLINIC | Age: 34
End: 2024-03-22

## 2024-03-22 LAB
IMM EOSINOPHIL NFR BLD MANUAL: 3 % (ref 0–6)
LYMPHOCYTES NFR BLD: 33 % (ref 14–44)
MONOCYTES NFR BLD AUTO: 4 % (ref 4–12)
NEUTS SEG NFR BLD AUTO: 52 % (ref 45–77)
PATHOLOGY REVIEW: YES
PLATELET BLD QL SMEAR: ADEQUATE
RBC MORPH BLD: NORMAL
TOTAL CELLS COUNTED SPEC: 100
VARIANT LYMPHS BLD QL SMEAR: 8 % (ref 0–0)

## 2024-03-22 NOTE — TELEPHONE ENCOUNTER
Patient Call    Who are you speaking with? Patient    If it is not the patient, are they listed on an active communication consent form? N/A   What is the reason for this call? Patient received his biopsy results and would like them to be reviewed so his paperwork can be filled out for work   Does this require a call back? Yes   If a call back is required, please list [facility] call back number [phone]   If a call back is required, advise that a message will be forwarded to their care team and someone will return their call as soon as possible.   Did you relay this information to the patient? Yes

## 2024-03-22 NOTE — PROGRESS NOTES
"[address]  [facility] [place]  [address]  [facility] [place] [address]  Hematology Ambulatory Follow-Up  Kennedy Carter, 1990, [id number]  3/21/2024    3/22/2024 Addendum:  This is a 33-year-old male who is in the office on 3/21.  Bone marrow biopsy report has returned demonstrates no abnormalities within the bone marrow that could explain elevated hemoglobin.  See note below about subsequent referrals.    \"A -C.  Bone marrow,  right iliac crest,  biopsy, clot, peripheral blood, and aspirate:  -  No morphologic or immunophenotypic evidence of a myeloid neoplasm, see comment.  -  Normocellular marrow (~70% cellularity) with mild erythroid predominant trilineage maturing hematopoiesis with no increase in blasts.  -  Adequate iron stores  -  No increase in reticulin fibrosis.\"     Assessment and Plan   1. Elevated hemoglobin (HCC)  Baseline hemoglobin and hematocrit going back to 2018 demonstrated a hematocrit above 50%.     Patient is presenting with a hemoglobin between 52 and 53% with symptoms including elevated blood pressure, dizziness and palpitations in September and October 2023, with continued elevation around 51%.    Work up review:  Patient was referred to cardiology-no arrhythmias found, no abnormalities on echocardiogram stress test was within normal limits however chest pain was reproducible and resolved spontaneously.    His testosterone was checked and is in the acceptable range for normal supplementation for 29-year-old male.  JAK2 mutational testing, CALR, JAK2 exon mutation and MPL were negative.  Carboxyhemoglobin was mildly elevated at 2.1%, patient does not smoke/vape.  Patient does not have gas heat in his house.   November 2023 CT scan of the chest was benign no mediastinal masses or pulmonary masses.  Abdominal ultrasounds completed previously did not demonstrate any abnormalities of the right upper quadrant, urinalysis was negative.  MRI of brain without " and with contrast did not demonstrate any vascular abnormalities this was completed in November and December 2022.  Bone Marrow Biopsy completed on 3/6/2024 without results.   Sleep Evaluation is pending.     Most recent hematocrit has been at 50.9%.  I am awaiting the results of the bone marrow biopsy because at this point if bone marrow biopsy is truly negative then this is not a hematologic issue.  Patient should still follow-up with sleep study and evaluation.  There is no new symptomatology.    Once bone marrow biopsy returns -I will provide a letter for the patient's return to work.  This will be prepped as I will be out of the office next week.    The patient is scheduled for follow-up in approximately 6 month follow up AFTER sleep study.     Patient voiced agreement and understanding to the above.   Patient advised to call the Hematology/Oncology office with any questions and concerns regarding the above.    Barrier(s) to care: None  The patient is able to self care.    Nicolasa Hernandez PA-C  Medical Oncology/Hematology  [facility]    Subjective     Chief Complaint   Patient presents with    Follow-up       History of present illness:   This is a 33-year-old male with past medical history of hypertension, altitude sickness with frequent trips to [facility], hypertension, hypothyroidism on supplement, hyperlipidemia, varicosities of the lower extremity, palpitations who presents to the hematology clinic for evaluation of erythrocytosis and polycythemia.     Patient notes that he has been told that his hemoglobin has been elevated in the past.  However, most the time this was discovered it was blamed on recent travel since the patient had been making frequent trips to [facility].  Patient notes that at his first visit to [facility] he had a significant episode of altitude sickness which he states hypertension has stemmed from.  This episode occurred in January 2022 patient notes that he was  given drops that he ended up overdosing on and having to go to the hospital for additional attention and steroids.     She has long history of palpitations dating back to over 5 years ago.  Timeline is somewhat skewed patient does not deliver history in the sense of dates that he can relate to me however, it seems that the patient has had on and off palpitations throughout the past 5 years.  Previous Holter monitoring has been collected to assess for this.  Patient does not have a specific diagnosis for arrhythmia.  Patient notes that when he does experience this he does not always go to the hospital.  We discussed today that that is imperative to diagnosis.     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  Patient does note that in the past he has been on testosterone supplements but this was over 5 years ago.  Patient does not know if the preworkout has any testosterone builder in it which also can cause elevation of hemoglobin .  Patient denies pains in the legs however he does note that varicose veins of the lower extremities seem to have worsened over the past several months.     No history of MI, CVA or DVT/PE.     Hematologic trends:  8/18/2018 RBC 5.9, hemoglobin 17.9, hematocrit 50.2, MCV 84, platelet count 241, WBC 8.5  TSH 7.3  6/23/2021 TSH 5.3  1/18/2022 RBC 5.8, hemoglobin 17.5, hematocrit 49.6, MCV 85, RDW 12.8  1/27/2022 RBC 6.3, hemoglobin 18.9, hematocrit 54.5, MCV 85, platelet 254, WBC 4.5  10/6/2022 RBC 5.8, hemoglobin 17.1, hematocrit 50.0, MCV 85, platelet count 275, WBC 6.9     9/18/2023 RBC 6.0, hemoglobin 17.9, hematocrit 53.5, MCV 88, platelet count 264, WBC 7.1.  TSH 4.5     10/16/2023: WBC 7.7, hemoglobin 18.2, HCT 52.4, MCV 85, platelet count 264  Erythropoietin 6.8, BUN and creatinine within normal limits with EGFR of 73, LFTs WNL, negative urinalysis no occult blood,  carboxyhemoglobin at 2.1%  TSH 2.89    11/3/2023: JAK2 V6 17 F-, Shan exon mutations 12 through 15 negative, MPL negative, JOVAN are negative    11/28/2023: CT of the chest did not demonstrate acute cardiopulmonary processes and no mediastinal masses.    2/3/2024: WBC 8.3, hemoglobin 17.6, hematocrit 51.2%, MCV 85, platelets 303.   Creatinine 1.17, BUN 10, AST and ALT WNL, calcium 9.5   Testosterone 398    3/20/2024: WBC 7.2, hemoglobin 17.3, hematocrit 50.9%, MCV 86, platelet count 300   Ferritin 156, TIBC 359, iron saturation 26    Interval history: Patient is out of work secondary to the necessity for bone marrow biopsy.    Review of Systems   Constitutional:  Positive for fatigue. Negative for activity change, appetite change and fever.   HENT:  Negative for nosebleeds.    Respiratory:  Negative for cough, choking and shortness of breath.    Cardiovascular:  Positive for palpitations. Negative for chest pain and leg swelling.   Gastrointestinal:  Negative for abdominal distention, abdominal pain, anal bleeding, blood in stool, constipation, diarrhea, nausea and vomiting.   Endocrine: Negative for cold intolerance.   Genitourinary:  Negative for hematuria.   Musculoskeletal:  Negative for myalgias.   Skin:  Negative for color change, pallor and rash.   Allergic/Immunologic: Negative for immunocompromised state.   Neurological:  Negative for headaches.   Hematological:  Negative for adenopathy. Does not bruise/bleed easily.   All other systems reviewed and are negative.      Patient Active Problem List   Diagnosis    Hypothyroidism    Gastroesophageal reflux disease without esophagitis    Palpitations    Tinnitus of both ears    Mixed hyperlipidemia    Vitamin D deficiency    Hypercalcemia    Insomnia    Chronic nonintractable headache    Polycythemia    Varicose veins of both lower extremities    GAGANDEEP (generalized anxiety disorder)    Encounter for annual physical exam     Past Medical History:   Diagnosis Date     GERD (gastroesophageal reflux disease)     Headache(784.0) 12/27/2021    Headachs    Hyperlipidemia     Hypertension     Hypothyroid     Irregular heart beat     Thyroid disorder     Varicose vein of leg     Vertigo      Past Surgical History:   Procedure Laterality Date    IR BIOPSY BONE MARROW  3/6/2024    PILONIDAL CYST EXCISION      WISDOM TOOTH EXTRACTION       Family History   Problem Relation Age of Onset    Hypothyroidism Mother     BARBARA disease Mother     Hypertension Father     Heart disease Maternal Grandfather     Heart disease Paternal Grandfather     Arthritis Family     Heart disease Family     Hypertension Family      Social History     Socioeconomic History    Marital status: /Civil Union     Spouse name: Not on file    Number of children: Not on file    Years of education: Not on file    Highest education level: Not on file   Occupational History    Not on file   Tobacco Use    Smoking status: Never    Smokeless tobacco: Never   Vaping Use    Vaping status: Never Used   Substance and Sexual Activity    Alcohol use: Not Currently     Alcohol/week: 0.0 standard drinks of alcohol     Comment: socially    Drug use: Not Currently    Sexual activity: Yes     Partners: Male   Other Topics Concern    Not on file   Social History Narrative    Not on file     Social Determinants of Health     Financial Resource Strain: Not on file   Food Insecurity: Not on file   Transportation Needs: Not on file   Physical Activity: Not on file   Stress: Not on file   Social Connections: Not on file   Intimate Partner Violence: Not on file   Housing Stability: Not on file       Current Outpatient Medications:     cholecalciferol (VITAMIN D3) 1,000 units tablet, Take 1,000 Units by mouth daily, Disp: , Rfl:     levothyroxine 75 mcg tablet, Take 1 tablet (75 mcg total) by mouth daily, Disp: 90 tablet, Rfl: 1    triamcinolone (KENALOG) 0.1 % ointment, Apply 1 application. topically 2 (two) times a day As needed (Patient  "not taking: Reported on 2/5/2024), Disp: , Rfl:   Allergies   Allergen Reactions    Penicillins Hives    Latex Rash       Objective   /78 (BP Location: Right arm, Patient Position: Sitting, Cuff Size: Adult)   Pulse 84   Temp (!) 97.2 °F (36.2 °C)   Resp 17   Ht 6' 1\" (1.854 m)   Wt 122 kg (268 lb)   SpO2 100%   BMI 35.36 kg/m²    Physical Exam  Constitutional:       General: He is not in acute distress.     Appearance: He is well-developed.   HENT:      Head: Normocephalic and atraumatic.      Right Ear: External ear normal.      Left Ear: External ear normal.      Nose: Nose normal.   Eyes:      General: No scleral icterus.     Conjunctiva/sclera: Conjunctivae normal.   Cardiovascular:      Rate and Rhythm: Normal rate.   Pulmonary:      Effort: No respiratory distress.   Abdominal:      General: There is no distension.      Palpations: Abdomen is soft.   Skin:     Findings: No rash (on exposed skin.).   Neurological:      Mental Status: He is alert and oriented to person, place, and time.   Psychiatric:         Thought Content: Thought content normal.         Result Review  Labs:  Appointment on 03/20/2024   Component Date Value Ref Range Status    WBC 03/20/2024 7.22  4.31 - 10.16 Thousand/uL Final    RBC 03/20/2024 5.92 (H)  3.88 - 5.62 Million/uL Final    Hemoglobin 03/20/2024 17.3 (H)  12.0 - 17.0 g/dL Final    Hematocrit 03/20/2024 50.9 (H)  36.5 - 49.3 % Final    MCV 03/20/2024 86  82 - 98 fL Final    MCH 03/20/2024 29.2  26.8 - 34.3 pg Final    MCHC 03/20/2024 34.0  31.4 - 37.4 g/dL Final    RDW 03/20/2024 12.2  11.6 - 15.1 % Final    MPV 03/20/2024 9.2  8.9 - 12.7 fL Final    Platelets 03/20/2024 300  149 - 390 Thousands/uL Final    nRBC 03/20/2024 0  /100 WBCs Final    Neutrophils Relative 03/20/2024 50  43 - 75 % Final    Immature Grans % 03/20/2024 1  0 - 2 % Final    Lymphocytes Relative 03/20/2024 35  14 - 44 % Final    Monocytes Relative 03/20/2024 8  4 - 12 % Final    Eosinophils " Relative 03/20/2024 5  0 - 6 % Final    Basophils Relative 03/20/2024 1  0 - 1 % Final    Neutrophils Absolute 03/20/2024 3.69  1.85 - 7.62 Thousands/µL Final    Absolute Immature Grans 03/20/2024 0.04  0.00 - 0.20 Thousand/uL Final    Absolute Lymphocytes 03/20/2024 2.52  0.60 - 4.47 Thousands/µL Final    Absolute Monocytes 03/20/2024 0.58  0.17 - 1.22 Thousand/µL Final    Eosinophils Absolute 03/20/2024 0.33  0.00 - 0.61 Thousand/µL Final    Basophils Absolute 03/20/2024 0.06  0.00 - 0.10 Thousands/µL Final    Iron Saturation 03/20/2024 26  15 - 50 % Final    TIBC 03/20/2024 359  250 - 450 ug/dL Final    Iron 03/20/2024 93  50 - 212 ug/dL Final    Patients treated with metal-binding drugs (ie. Deferoxamine) may have depressed iron values.    UIBC 03/20/2024 266  155 - 355 ug/dL Final    Ferritin 03/20/2024 156  24 - 336 ng/mL Final   Hospital Outpatient Visit on 03/06/2024   Component Date Value Ref Range Status    WBC 03/06/2024 6.62  4.31 - 10.16 Thousand/uL Final    RBC 03/06/2024 6.17 (H)  3.88 - 5.62 Million/uL Final    Hemoglobin 03/06/2024 18.1 (H)  12.0 - 17.0 g/dL Final    Hematocrit 03/06/2024 51.7 (H)  36.5 - 49.3 % Final    MCV 03/06/2024 84  82 - 98 fL Final    MCH 03/06/2024 29.3  26.8 - 34.3 pg Final    MCHC 03/06/2024 35.0  31.4 - 37.4 g/dL Final    RDW 03/06/2024 12.2  11.6 - 15.1 % Final    MPV 03/06/2024 8.6 (L)  8.9 - 12.7 fL Final    Platelets 03/06/2024 329  149 - 390 Thousands/uL Final    nRBC 03/06/2024 0  /100 WBCs Final    Miscellaneous Lab Test Result 03/06/2024 SEE WRITTEN REPORT   Final    Miscellaneous Lab Test Result 03/06/2024 SEE WRITTEN REPORT   Final    Miscellaneous Lab Test Result 03/06/2024 SEE WRITTEN REPORT   Final    Miscellaneous Lab Test Result 03/06/2024 SEE WRITTEN REPORT   Final    Miscellaneous Lab Test Result 03/06/2024 SEE WRITTEN REPORT   Final       Please note:  This report has been generated by a voice recognition software system. Therefore there may be syntax,  spelling, and/or grammatical errors. Please call if you have any questions.

## 2024-03-22 NOTE — TELEPHONE ENCOUNTER
Phoned pt to let him know I have tried to fax his RTW form 3 times unsuccessfully, phoned the office and got a recording and no one ever picked up the phone. I e mailed forms to pt's e mail so he can print out and take in. Forms then scanned into Epic.
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
## 2024-03-22 NOTE — TELEPHONE ENCOUNTER
I don't have it- It is in [place] - Lucille had It holding it for Enma.  I don't see that it was scanned in.  Can the patient send us new forms?

## 2024-06-06 PROBLEM — E78.2 MIXED HYPERLIPIDEMIA: Status: RESOLVED | Noted: 2022-02-02 | Resolved: 2024-06-06

## 2024-06-06 PROBLEM — E83.52 HYPERCALCEMIA: Status: RESOLVED | Noted: 2022-03-28 | Resolved: 2024-06-06

## 2024-06-18 ENCOUNTER — OFFICE VISIT (OUTPATIENT)
Dept: SLEEP CENTER | Facility: CLINIC | Age: 34
End: 2024-06-18
Payer: COMMERCIAL

## 2024-06-18 VITALS
HEIGHT: 73 IN | DIASTOLIC BLOOD PRESSURE: 80 MMHG | WEIGHT: 261 LBS | RESPIRATION RATE: 16 BRPM | HEART RATE: 86 BPM | OXYGEN SATURATION: 99 % | SYSTOLIC BLOOD PRESSURE: 118 MMHG | BODY MASS INDEX: 34.59 KG/M2

## 2024-06-18 DIAGNOSIS — D75.1 POLYCYTHEMIA: ICD-10-CM

## 2024-06-18 DIAGNOSIS — D58.2 ELEVATED HEMOGLOBIN (HCC): ICD-10-CM

## 2024-06-18 DIAGNOSIS — R06.83 SNORING: ICD-10-CM

## 2024-06-18 DIAGNOSIS — G47.19 EXCESSIVE DAYTIME SLEEPINESS: Primary | ICD-10-CM

## 2024-06-18 PROCEDURE — 99244 OFF/OP CNSLTJ NEW/EST MOD 40: CPT | Performed by: PHYSICIAN ASSISTANT

## 2024-06-18 NOTE — PATIENT INSTRUCTIONS
I placed an order for a home sleep study today.  Please schedule this test as soon as possible.  Once your study is completed, your results will be available within 1 to 2 weeks.  A nurse will call and review them with you.  If you are found to have sleep apnea, I would recommend starting CPAP treatment and following up in 31 to 90 days.    It is very important to avoid driving while drowsy, this can be very dangerous or even cause serious injury or death.  If sleepy, it is not safe to get behind the wheel.  If you are driving and feels sleepy, it is very important to pull over right away.  Even losing control of the car for a split second can be deadly.  If you feel you cannot control when sleepiness occurs and cannot prevented, it is important to not drive at all until this improves.  Please let me know if you experience this as it is very important.     Nursing Support:  When: Monday through Friday 7A-5PM except holidays  Where: Our direct line is [phone].    If you are having a true emergency please call 911.  In the event that the line is busy or it is after hours please leave a voice message and we will return your call.  Please speak clearly, leaving your full name, birth date, best number to reach you and the reason for your call.   Medication refills: We will need the name of the medication, the dosage, the ordering provider, whether you get a 30 or 90 day refill, and the pharmacy name and address.  Medications will be ordered by the provider only.  Nurses cannot call in prescriptions.  Please allow 7 days for medication refills.  Physician requested updates: If your provider requested that you call with an update after starting medication, please be ready to provide us the medication and dosage, what time you take your medication, the time you attempt to fall asleep, time you fall asleep, when you wake up, and what time you get out of bed.  Sleep Study Results: We will contact you with sleep study  results and/or next steps after the physician has reviewed your testing.

## 2024-06-18 NOTE — PROGRESS NOTES
Consultation - [facility]  Kennedy Carter, 1990, MRN: [id number]    6/18/2024        Reason for Consult / Principal Problem:    Suspected Obstructive Sleep Apnea  Obesity       Thank you for the opportunity of participating in the evaluation and care of this patient in the Sleep Clinic at [facility].      Subjective:     HPI: Kennedy Carter is a 34 y.o. year old male who presents today upon referral of his hematologist for evaluation of suspected obstructive sleep apnea.  Patient has been following with hematology due to an elevated hemoglobin and hematocrit level.  He most recently had a bone marrow biopsy which was normal.  Carboxyhemoglobin was mildly elevated at 2.1%.  He completed cardiac testing all of which was normal.  Since his bone marrow biopsy was normal, hematology feels this is not a hematological issue.  He is experiencing symptoms that could be consistent with obstructive sleep apnea including loud snoring, choking, and daytime sleepiness.  Previously his doctor ordered a home sleep study, but he did not complete the test.  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  His work schedule is from 6 to 7 PM to 6 to 8 AM, 5 days a week.  Starting next week he will be switching to daylight hours.  He states working night shift is difficult for him.  He states when he is able to sleep during the night and be awake during the day he feels he sleeps better and is more rested.  Patient states since no other cause of his elevated hemoglobin hematocrit was found, he is willing to have a sleep study and use CPAP if needed.    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  He will be switching to daylight hours next week.    Sleep Schedule:       Bedtime:  7 AM, will be going to day light hours next week       Latency:  within minutes       Wakeup time:  2-3 PM     Awakenings:       Frequency:  3-4 times per night       Causes:  urination, kids       Duration:  brief, not in deep sleep during the day     Daytime Sleepiness / Inappropriate Sleep:       Most severe:  afternoons or if working nights 10-11 PM        Naps :  no naps due to no time       Inappropriate drowsiness / sleep:  no dozing     Snoring:  loud snoring, choking     Apnea:  not witnessed     Change in Weight:  gained 30 pounds over last 2 years     Restless Leg Syndrome:  no clinical symptoms consistent with this diagnosis     Other Complaints:   No reports of sleep walking, sleep talking, sleep paralysis or hallucinations surrounding sleep.  Denies waking up with headaches, bruxism, and dry mouth.     Social History:      Caffeine:  coffee 1-2 cups per day       Tobacco:   reports that he has never smoked. He has never used smokeless tobacco.     E-cig/Vaping:    E-Cigarette/Vaping    E-Cigarette Use Never User       E-Cigarette/Vaping Substances    Nicotine No     THC No     CBD No     Flavoring No     Other No     Unknown No          Alcohol:   reports that he does not currently use alcohol.       Drugs:   reports that he does not currently use drugs.       The review of systems and following portions of the patient's history were reviewed and updated as appropriate: allergies, current medications, past family history, past medical history, past social history, past surgical history and problem list.        Objective:       Vitals:    06/18/24 1008   BP: 118/80   Pulse: 86   Resp: 16   SpO2: 99%   Weight: 118 kg (261 lb)   Height: 6' 1\" (1.854 m)     Body mass index is 34.43 kg/m².  Neck Circumference: 15.5  [place] Sleepiness Scale: Total score: 6      Current " Outpatient Medications:     cholecalciferol (VITAMIN D3) 1,000 units tablet, Take 1,000 Units by mouth daily, Disp: , Rfl:     levothyroxine 75 mcg tablet, Take 1 tablet (75 mcg total) by mouth daily, Disp: 90 tablet, Rfl: 1    triamcinolone (KENALOG) 0.1 % ointment, Apply 1 application. topically 2 (two) times a day As needed (Patient not taking: Reported on 2/5/2024), Disp: , Rfl:     Physical Exam  General Appearance:   Alert, cooperative, no distress, appears stated age, obese     Head:   Normocephalic, without obvious abnormality, atraumatic     Eyes:   PERRL, conjunctiva/corneas clear          Nose:  Nares normal, septum midline, mucosa normal, no drainage or sinus tenderness           Throat:  Lips, teeth and gums normal; tongue thick in size with scalloping and midline in position; mucosa moist, uvula normal, tonsils 2+, Mallampati class 3       Neck:  Supple, symmetrical, trachea midline, no adenopathy; no thyromegaly noted     Lungs:      Clear to auscultation bilaterally, respirations unlabored     Heart:   Regular rate and rhythm, no murmur       Extremities:  Extremities normal, atraumatic, no cyanosis or edema       Skin:  Skin color, texture, turgor normal, no rashes or lesions       Neurologic:  No focal deficits noted.          ASSESSMENT / PLAN     1. Excessive daytime sleepiness  Assessment & Plan:  Patient has symptoms consistent with obstructive sleep apnea including loud snoring, gasping, choking, and sleepiness.  We discussed the importance of not driving while drowsy.  Patient is aware that drowsy driving can lead to accidents, injury, or death for himself or others.  He is aware if he becomes drowsy he should pull over immediately.  This is especially important given the fact he has a 's license.  He is switching to daylight hours next week as it is easier for him to be more wakeful during the day.  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  Patient will complete his sleep study as soon as possible.  If he is found to have sleep apnea, auto CPAP will be ordered and he will follow-up in 31 to 90 days.  Orders:  -     Home Study; Future; Expected date: 06/18/2024  2. Elevated hemoglobin (HCC)  -     Ambulatory Referral to Sleep Medicine  3. Snoring  -     Home Study; Future; Expected date: 06/18/2024  4. Polycythemia  Assessment & Plan:  We discussed that his elevated hemoglobin could be a result of untreated obstructive sleep apnea.         Counseling / Coordination of Care    I have spent a total time of 55 minutes on 06/18/24 in caring for this patient including Diagnostic results, Prognosis, Risks and benefits of tx options, Instructions for management, Patient and family education, Importance of tx compliance, Risk factor reductions, Impressions, Counseling / Coordination of care, Documenting in the medical record, Reviewing / ordering tests, medicine, procedures  , and Obtaining or reviewing history  .    The following instructions have been given to the patient today:    Patient Instructions   I placed an order for a home sleep study today.  Please schedule this test as soon as possible.  Once your study is completed, your results will be available within 1 to 2 weeks.  A nurse will call and review them with you.  If you are found to have sleep apnea, I would recommend starting CPAP treatment and following up in 31 to 90 days.    It is very important to avoid driving while drowsy, this can be very dangerous or even cause serious injury or death.  If sleepy, it is not safe to get behind the wheel.  If you are driving and feels sleepy, it is very important to pull over right away.  Even losing control of the car for a split second can be deadly.  If you feel you cannot control when sleepiness occurs and cannot prevented, it is important to not drive at all until this improves.  Please let me  know if you experience this as it is very important.     Nursing Support:  When: Monday through Friday 7A-5PM except holidays  Where: Our direct line is [phone].    If you are having a true emergency please call 911.  In the event that the line is busy or it is after hours please leave a voice message and we will return your call.  Please speak clearly, leaving your full name, birth date, best number to reach you and the reason for your call.   Medication refills: We will need the name of the medication, the dosage, the ordering provider, whether you get a 30 or 90 day refill, and the pharmacy name and address.  Medications will be ordered by the provider only.  Nurses cannot call in prescriptions.  Please allow 7 days for medication refills.  Physician requested updates: If your provider requested that you call with an update after starting medication, please be ready to provide us the medication and dosage, what time you take your medication, the time you attempt to fall asleep, time you fall asleep, when you wake up, and what time you get out of bed.  Sleep Study Results: We will contact you with sleep study results and/or next steps after the physician has reviewed your testing.           Helga Beal PA-C  [facility]

## 2024-06-18 NOTE — ASSESSMENT & PLAN NOTE
Patient has symptoms consistent with obstructive sleep apnea including loud snoring, gasping, choking, and sleepiness.  We discussed the importance of not driving while drowsy.  Patient is aware that drowsy driving can lead to accidents, injury, or death for himself or others.  He is aware if he becomes drowsy he should pull over immediately.  This is especially important given the fact he has a 's license.  He is switching to daylight hours next week as it is easier for him to be more wakeful during the day.  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  Patient will complete his sleep study as soon as possible.  If he is found to have sleep apnea, auto CPAP will be ordered and he will follow-up in 31 to 90 days.

## 2024-06-20 ENCOUNTER — HOSPITAL ENCOUNTER (OUTPATIENT)
Dept: SLEEP CENTER | Facility: CLINIC | Age: 34
Discharge: HOME | End: 2024-06-20

## 2024-08-11 ENCOUNTER — HOSPITAL ENCOUNTER (OUTPATIENT)
Dept: SLEEP CENTER | Facility: CLINIC | Age: 34
Discharge: HOME | End: 2024-08-11
Payer: COMMERCIAL

## 2024-08-11 DIAGNOSIS — R06.83 SNORING: ICD-10-CM

## 2024-08-11 DIAGNOSIS — G47.19 EXCESSIVE DAYTIME SLEEPINESS: ICD-10-CM

## 2024-08-11 PROCEDURE — G0399 HOME SLEEP TEST/TYPE 3 PORTA: HCPCS

## 2024-08-12 NOTE — PROGRESS NOTES
Home Sleep Study Documentation    HOME STUDY DEVICE: Noxturnal no                                           María G3 yes      Pre-Sleep Home Study:    Set-up and instructions performed by: Radha Sena    Technician performed demonstration for Patient: yes    Return demonstration performed by Patient: yes    Written instructions provided to Patient: yes    Patient signed consent form: yes        Post-Sleep Home Study:    Additional comments by Patient:      Home Sleep Study Failed:no:    Failure reason: N/A    Reported or Detected: N/A    Scored by: SNUITHA Mendez

## 2024-08-13 ENCOUNTER — OFFICE VISIT (OUTPATIENT)
Dept: FAMILY MEDICINE CLINIC | Facility: CLINIC | Age: 34
End: 2024-08-13
Payer: COMMERCIAL

## 2024-08-13 VITALS
WEIGHT: 269 LBS | HEIGHT: 73 IN | RESPIRATION RATE: 16 BRPM | HEART RATE: 86 BPM | SYSTOLIC BLOOD PRESSURE: 120 MMHG | OXYGEN SATURATION: 98 % | DIASTOLIC BLOOD PRESSURE: 78 MMHG | BODY MASS INDEX: 35.65 KG/M2

## 2024-08-13 DIAGNOSIS — D75.1 POLYCYTHEMIA: ICD-10-CM

## 2024-08-13 DIAGNOSIS — M77.11 LATERAL EPICONDYLITIS OF RIGHT ELBOW: ICD-10-CM

## 2024-08-13 DIAGNOSIS — M25.511 CHRONIC RIGHT SHOULDER PAIN: ICD-10-CM

## 2024-08-13 DIAGNOSIS — E55.9 VITAMIN D DEFICIENCY: ICD-10-CM

## 2024-08-13 DIAGNOSIS — Z00.00 ENCOUNTER FOR ANNUAL PHYSICAL EXAM: ICD-10-CM

## 2024-08-13 DIAGNOSIS — E03.9 ACQUIRED HYPOTHYROIDISM: Primary | ICD-10-CM

## 2024-08-13 DIAGNOSIS — N52.9 ERECTILE DYSFUNCTION, UNSPECIFIED ERECTILE DYSFUNCTION TYPE: ICD-10-CM

## 2024-08-13 DIAGNOSIS — G89.29 CHRONIC RIGHT SHOULDER PAIN: ICD-10-CM

## 2024-08-13 PROBLEM — E66.812 CLASS 2 OBESITY DUE TO EXCESS CALORIES WITH BODY MASS INDEX (BMI) OF 35.0 TO 35.9 IN ADULT: Status: RESOLVED | Noted: 2023-03-13 | Resolved: 2024-08-13

## 2024-08-13 PROBLEM — G47.33 OSA (OBSTRUCTIVE SLEEP APNEA): Status: ACTIVE | Noted: 2024-08-13

## 2024-08-13 PROBLEM — E66.09 CLASS 2 OBESITY DUE TO EXCESS CALORIES WITH BODY MASS INDEX (BMI) OF 35.0 TO 35.9 IN ADULT: Status: RESOLVED | Noted: 2023-03-13 | Resolved: 2024-08-13

## 2024-08-13 PROCEDURE — 99214 OFFICE O/P EST MOD 30 MIN: CPT | Performed by: FAMILY MEDICINE

## 2024-08-13 PROCEDURE — 99395 PREV VISIT EST AGE 18-39: CPT | Performed by: FAMILY MEDICINE

## 2024-08-13 RX ORDER — SILDENAFIL 50 MG/1
50 TABLET, FILM COATED ORAL DAILY PRN
Qty: 30 TABLET | Refills: 3 | Status: SHIPPED | OUTPATIENT
Start: 2024-08-13

## 2024-08-13 RX ORDER — LEVOTHYROXINE SODIUM 75 UG/1
75 TABLET ORAL DAILY
Qty: 90 TABLET | Refills: 2 | Status: SHIPPED | OUTPATIENT
Start: 2024-08-13

## 2024-08-13 NOTE — PROGRESS NOTES
Ambulatory Visit  Name: Kennedy Carter      : 1990      MRN: [id number]  Encounter Provider: Eugenio Ridley MD  Encounter Date: 2024   Encounter department: [facility]    Assessment & Plan   1. Acquired hypothyroidism  Assessment & Plan:  Recheck TSH and Free T4. Continue levothyroxine 75 mcg qd.   Orders:  -     TSH, 3rd generation; Future  -     T4, free; Future  -     levothyroxine 75 mcg tablet; Take 1 tablet (75 mcg total) by mouth daily  2. Encounter for annual physical exam  Assessment & Plan:  CPE done. Last Tdap was in 2018. Had COVID vaccines. Pt advised to follow a well balanced, heart healthy diet and to exercise on a regular basis. Not a smoker. Blood pressure ok.   3. Polycythemia  Assessment & Plan:  Recheck CBC. Patient saw Hematology in 2024. Pt advised to donate blood on a regular basis.   Orders:  -     CBC and differential; Future  4. Vitamin D deficiency  Assessment & Plan:  Recheck level. Continue 2000 U qd.   Orders:  -     Vitamin D 25 hydroxy; Future  5. Chronic right shoulder pain  Assessment & Plan:  Patient has had it for months and likely has tendonitis or rotator cuff injury. Will refer to Orthopedics. Patient to take advil as needed.   Orders:  -     Ambulatory Referral to Orthopedic Surgery; Future  6. Lateral epicondylitis of right elbow  Assessment & Plan:  Patient has had it for 2 weeks. Patient to rest it and use elbow strap. Patient to ice it and take advil as needed. Will refer to Orthopedics   Orders:  -     Ambulatory Referral to Orthopedic Surgery; Future  7. Erectile dysfunction, unspecified erectile dysfunction type  Assessment & Plan:  Testosterone level ok. Will try viagra 50 mg.   Orders:  -     sildenafil (VIAGRA) 50 MG tablet; Take 1 tablet (50 mg total) by mouth daily as needed for erectile dysfunction         History of Present Illness     Patient here for physical and follow-up Hypothyroidism, Polycythemia, Vitamin D  deficiency. Patient doing well. No chest pain or shortness of breath. No headaches. No abdominal pain. Last Tdap was in 2020. Had COVID vaccine. Not a smoker. Occasional ETOH. Has right shoulder pain for past few months. No injury. Hurts to lift things over head. Patient also has right elbow pain for past few weeks. Started after throwing a ball. Not taking any medications for it. Wants to see Orthopedics. Patient also has problems with Erectile Dysfunction and wants to try viagra.       Review of Systems   Constitutional:  Negative for activity change, appetite change, fatigue and unexpected weight change.   HENT:  Negative for congestion, ear pain, rhinorrhea, sore throat and trouble swallowing.    Eyes:  Negative for pain, discharge and visual disturbance.   Respiratory:  Negative for cough, shortness of breath and wheezing.    Cardiovascular:  Negative for chest pain, palpitations and leg swelling.   Gastrointestinal:  Negative for abdominal pain, constipation, diarrhea, nausea and vomiting.   Endocrine: Negative for polydipsia, polyphagia and polyuria.   Genitourinary:  Negative for difficulty urinating and hematuria.   Musculoskeletal:  Positive for arthralgias. Negative for back pain, gait problem, myalgias and neck pain.   Skin:  Negative for color change and rash.   Neurological:  Negative for dizziness, weakness and headaches.   Hematological:  Negative for adenopathy. Does not bruise/bleed easily.   Psychiatric/Behavioral:  Negative for dysphoric mood and sleep disturbance. The patient is not nervous/anxious.      Past Medical History:   Diagnosis Date   • GERD (gastroesophageal reflux disease)    • Headache(784.0) 12/27/2021    Headachs   • Hyperlipidemia    • Hypertension    • Hypothyroid    • Irregular heart beat    • Thyroid disorder    • Varicose vein of leg    • Vertigo      Past Surgical History:   Procedure Laterality Date   • IR BIOPSY BONE MARROW  3/6/2024   • PILONIDAL CYST EXCISION     •  "WISDOM TOOTH EXTRACTION       Family History   Problem Relation Age of Onset   • Hypothyroidism Mother    • BARBARA disease Mother    • Hypertension Father    • Heart disease Maternal Grandfather    • Heart disease Paternal Grandfather    • Arthritis Family    • Heart disease Family    • Hypertension Family      Social History     Tobacco Use   • Smoking status: Never   • Smokeless tobacco: Never   Vaping Use   • Vaping status: Never Used   Substance and Sexual Activity   • Alcohol use: Yes     Comment: socially   • Drug use: Not Currently   • Sexual activity: Yes     Partners: Male     Current Outpatient Medications on File Prior to Visit   Medication Sig   • [DISCONTINUED] levothyroxine 75 mcg tablet Take 1 tablet (75 mcg total) by mouth daily   • cholecalciferol (VITAMIN D3) 1,000 units tablet Take 1,000 Units by mouth daily (Patient not taking: Reported on 8/13/2024)   • [DISCONTINUED] triamcinolone (KENALOG) 0.1 % ointment Apply 1 application. topically 2 (two) times a day As needed (Patient not taking: Reported on 2/5/2024)     Allergies   Allergen Reactions   • Penicillins Hives   • Latex Rash     Immunization History   Administered Date(s) Administered   • COVID-19 PFIZER VACCINE 0.3 ML IM 05/22/2021   • Influenza Quadrivalent Preservative Free 3 years and older IM 12/17/2018   • Tdap 09/23/2008, 12/17/2018, 11/07/2020     Objective     /78 (BP Location: Left arm, Patient Position: Sitting, Cuff Size: Large)   Pulse 86   Resp 16   Ht 6' 1\" (1.854 m)   Wt 122 kg (269 lb)   SpO2 98%   BMI 35.49 kg/m²     Physical Exam  Vitals and nursing note reviewed.   Constitutional:       General: He is not in acute distress.     Appearance: Normal appearance. He is well-developed. He is obese. He is not ill-appearing.   HENT:      Head: Normocephalic and atraumatic.      Right Ear: Tympanic membrane, ear canal and external ear normal.      Left Ear: Tympanic membrane, ear canal and external ear normal.      Nose: " Nose normal. No congestion or rhinorrhea.      Mouth/Throat:      Mouth: Oropharynx is clear and moist. Mucous membranes are moist.      Pharynx: Oropharynx is clear. No posterior oropharyngeal erythema.   Eyes:      General:         Right eye: No discharge.      Extraocular Movements: EOM normal.      Conjunctiva/sclera: Conjunctivae normal.      Pupils: Pupils are equal, round, and reactive to light.   Neck:      Thyroid: No thyromegaly.   Cardiovascular:      Rate and Rhythm: Normal rate and regular rhythm.      Heart sounds: No murmur heard.  Pulmonary:      Effort: Pulmonary effort is normal. No respiratory distress.      Breath sounds: Normal breath sounds. No wheezing.   Abdominal:      General: Bowel sounds are normal.      Palpations: Abdomen is soft. There is no mass.      Tenderness: There is no abdominal tenderness.   Musculoskeletal:         General: No swelling, deformity or edema. Normal range of motion.      Cervical back: Normal range of motion and neck supple. No tenderness.      Right lower leg: No edema.      Left lower leg: No edema.      Comments: Tenderness to palpation right lateral shoulder and has pain when lifts over 90 degrees, tenderness to palpation right lateral elbow   Lymphadenopathy:      Cervical: No cervical adenopathy.   Skin:     General: Skin is warm and dry.      Capillary Refill: Capillary refill takes less than 2 seconds.      Findings: No erythema or rash.   Neurological:      General: No focal deficit present.      Mental Status: He is alert and oriented to person, place, and time.      Sensory: No sensory deficit.   Psychiatric:         Mood and Affect: Mood and affect and mood normal.         Behavior: Behavior normal.         Thought Content: Thought content normal.         Judgment: Judgment normal.

## 2024-08-13 NOTE — ASSESSMENT & PLAN NOTE
Patient has had it for 2 weeks. Patient to rest it and use elbow strap. Patient to ice it and take advil as needed. Will refer to Orthopedics

## 2024-08-13 NOTE — ASSESSMENT & PLAN NOTE
Recheck CBC. Patient saw Hematology in March 2024. Pt advised to donate blood on a regular basis.

## 2024-08-13 NOTE — ASSESSMENT & PLAN NOTE
Patient has had it for months and likely has tendonitis or rotator cuff injury. Will refer to Orthopedics. Patient to take advil as needed.

## 2024-08-13 NOTE — PATIENT INSTRUCTIONS
"Patient Education     Routine physical for adults   The Basics   Written by the doctors and editors at [facility]   What is a physical? -- A physical is a routine visit, or \"check-up,\" with your doctor. You might also hear it called a \"wellness visit\" or \"preventive visit.\"  During each visit, the doctor will:   Ask about your physical and mental health   Ask about your habits, behaviors, and lifestyle   Do an exam   Give you vaccines if needed   Talk to you about any medicines you take   Give advice about your health   Answer your questions  Getting regular check-ups is an important part of taking care of your health. It can help your doctor find and treat any problems you have. But it's also important for preventing health problems.  A routine physical is different from a \"sick visit.\" A sick visit is when you see a doctor because of a health concern or problem. Since physicals are scheduled ahead of time, you can think about what you want to ask the doctor.  How often should I get a physical? -- It depends on your age and health. In general, for people age 21 years and older:   If you are younger than 50 years, you might be able to get a physical every 3 years.   If you are 50 years or older, your doctor might recommend a physical every year.  If you have an ongoing health condition, like diabetes or high blood pressure, your doctor will probably want to see you more often.  What happens during a physical? -- In general, each visit will include:   Physical exam - The doctor or nurse will check your height, weight, heart rate, and blood pressure. They will also look at your eyes and ears. They will ask about how you are feeling and whether you have any symptoms that bother you.   Medicines - It's a good idea to bring a list of all the medicines you take to each doctor visit. Your doctor will talk to you about your medicines and answer any questions. Tell them if you are having any side effects that bother you. You " "should also tell them if you are having trouble paying for any of your medicines.   Habits and behaviors - This includes:   Your diet   Your exercise habits   Whether you smoke, drink alcohol, or use drugs   Whether you are sexually active   Whether you feel safe at home  Your doctor will talk to you about things you can do to improve your health and lower your risk of health problems. They will also offer help and support. For example, if you want to quit smoking, they can give you advice and might prescribe medicines. If you want to improve your diet or get more physical activity, they can help you with this, too.   Lab tests, if needed - The tests you get will depend on your age and situation. For example, your doctor might want to check your:   Cholesterol   Blood sugar   Iron level   Vaccines - The recommended vaccines will depend on your age, health, and what vaccines you already had. Vaccines are very important because they can prevent certain serious or deadly infections.   Discussion of screening - \"Screening\" means checking for diseases or other health problems before they cause symptoms. Your doctor can recommend screening based on your age, risk, and preferences. This might include tests to check for:   Cancer, such as breast, prostate, cervical, ovarian, colorectal, prostate, lung, or skin cancer   Sexually transmitted infections, such as chlamydia and gonorrhea   Mental health conditions like depression and anxiety  Your doctor will talk to you about the different types of screening tests. They can help you decide which screenings to have. They can also explain what the results might mean.   Answering questions - The physical is a good time to ask the doctor or nurse questions about your health. If needed, they can refer you to other doctors or specialists, too.  Adults older than 65 years often need other care, too. As you get older, your doctor will talk to you about:   How to prevent falling at " home   Hearing or vision tests   Memory testing   How to take your medicines safely   Making sure that you have the help and support you need at home  All topics are updated as new evidence becomes available and our peer review process is complete.  This topic retrieved from [employer] on: May 02, 2024.  Topic 339304 Version 1.0  Release: 32.4.3 - C32.122  © 2024 UpToDate, Inc. and/or its affiliates. All rights reserved.  Consumer Information Use and Disclaimer   Disclaimer: This generalized information is a limited summary of diagnosis, treatment, and/or medication information. It is not meant to be comprehensive and should be used as a tool to help the user understand and/or assess potential diagnostic and treatment options. It does NOT include all information about conditions, treatments, medications, side effects, or risks that may apply to a specific patient. It is not intended to be medical advice or a substitute for the medical advice, diagnosis, or treatment of a health care provider based on the health care provider's examination and assessment of a patient's specific and unique circumstances. Patients must speak with a health care provider for complete information about their health, medical questions, and treatment options, including any risks or benefits regarding use of medications. This information does not endorse any treatments or medications as safe, effective, or approved for treating a specific patient. UpToDate, Inc. and its affiliates disclaim any warranty or liability relating to this information or the use thereof.The use of this information is governed by the Terms of Use, available at https://www.woltersIptiviauwer.com/en/know/clinical-effectiveness-terms. 2024© UpToDate, Inc. and its affiliates and/or licensors. All rights reserved.  Copyright   © 2024 UpToDate, Inc. and/or its affiliates. All rights reserved.

## 2024-08-13 NOTE — ASSESSMENT & PLAN NOTE
CPE done. Last Tdap was in 2018. Had COVID vaccines. Pt advised to follow a well balanced, heart healthy diet and to exercise on a regular basis. Not a smoker. Blood pressure ok.

## 2024-08-16 PROCEDURE — G0399 HOME SLEEP TEST/TYPE 3 PORTA: HCPCS | Performed by: PSYCHIATRY & NEUROLOGY

## 2024-08-20 DIAGNOSIS — G47.33 OSA (OBSTRUCTIVE SLEEP APNEA): Primary | ICD-10-CM

## 2024-08-21 ENCOUNTER — TELEPHONE (OUTPATIENT)
Dept: HEMATOLOGY ONCOLOGY | Facility: CLINIC | Age: 34
End: 2024-08-21

## 2024-08-21 NOTE — TELEPHONE ENCOUNTER
Let message to make patient aware his appointment with negra has been rescheduled from 09/23/2024 to 09/03/2024 @ 9:40. Hopeline provided

## 2024-08-22 ENCOUNTER — TELEPHONE (OUTPATIENT)
Dept: SLEEP CENTER | Facility: CLINIC | Age: 34
End: 2024-08-22

## 2024-08-22 NOTE — TELEPHONE ENCOUNTER
[facility]  Home study consistent with at least moderate sleep apnea. Auto CPAP ordered. 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 Follow up 31-90 days after beginning treatment.  ----------------------------------------------------------------    Patient left message requesting results of sleep study.    Returned keyla butt advised of results and order for CPAP.     Patient agreeable to use [facility] as DME provider.  Advised patient that the CPAP Rx will be sent to [facility] and one of their representatives should reach out in 7-10 days to schedule a set up appointment in the office.  Provided patient with the phone number to [facility]([phone]) to call to follow up if not contacted by [facility].      Notified office staff via Teams DME chat to process CPAP Rx.     Scheduled compliance follow up 10/8/24.

## 2024-08-23 ENCOUNTER — TELEPHONE (OUTPATIENT)
Dept: SLEEP CENTER | Facility: CLINIC | Age: 34
End: 2024-08-23

## 2024-08-27 ENCOUNTER — TELEPHONE (OUTPATIENT)
Dept: HEMATOLOGY ONCOLOGY | Facility: CLINIC | Age: 34
End: 2024-08-27

## 2024-08-27 DIAGNOSIS — D58.2 ELEVATED HEMOGLOBIN (HCC): Primary | ICD-10-CM

## 2024-08-27 NOTE — TELEPHONE ENCOUNTER
Called and LVM for patient to have labs collected prior to his 9/3 appointment with RHONDA Hernandez.    Mentioned that the labs are already in the system and that as long as pt goes to a [facility]'s lab there is no paper work needed.    Mentioned that if patient is unable to have labs collected please give the office a call back.

## 2024-08-29 ENCOUNTER — TELEPHONE (OUTPATIENT)
Dept: SLEEP CENTER | Facility: CLINIC | Age: 34
End: 2024-08-29

## 2024-08-29 LAB
DME PARACHUTE DELIVERY DATE ACTUAL: NORMAL
DME PARACHUTE DELIVERY DATE EXPECTED: NORMAL
DME PARACHUTE DELIVERY DATE REQUESTED: NORMAL
DME PARACHUTE ITEM DESCRIPTION: NORMAL
DME PARACHUTE ORDER STATUS: NORMAL
DME PARACHUTE SUPPLIER NAME: NORMAL
DME PARACHUTE SUPPLIER PHONE: NORMAL

## 2024-09-07 ENCOUNTER — APPOINTMENT (OUTPATIENT)
Dept: LAB | Facility: MEDICAL CENTER | Age: 34
End: 2024-09-07
Payer: COMMERCIAL

## 2024-09-07 ENCOUNTER — OFFICE VISIT (OUTPATIENT)
Dept: OBGYN CLINIC | Facility: CLINIC | Age: 34
End: 2024-09-07
Payer: COMMERCIAL

## 2024-09-07 DIAGNOSIS — M25.511 CHRONIC RIGHT SHOULDER PAIN: Primary | ICD-10-CM

## 2024-09-07 DIAGNOSIS — D75.1 POLYCYTHEMIA: ICD-10-CM

## 2024-09-07 DIAGNOSIS — M75.21 BICEPS TENDINITIS OF RIGHT UPPER EXTREMITY: ICD-10-CM

## 2024-09-07 DIAGNOSIS — E55.9 VITAMIN D DEFICIENCY: ICD-10-CM

## 2024-09-07 DIAGNOSIS — M75.81 ROTATOR CUFF TENDINITIS, RIGHT: ICD-10-CM

## 2024-09-07 DIAGNOSIS — E03.9 ACQUIRED HYPOTHYROIDISM: ICD-10-CM

## 2024-09-07 DIAGNOSIS — M77.11 LATERAL EPICONDYLITIS OF RIGHT ELBOW: ICD-10-CM

## 2024-09-07 DIAGNOSIS — G89.29 CHRONIC RIGHT SHOULDER PAIN: Primary | ICD-10-CM

## 2024-09-07 LAB
25(OH)D3 SERPL-MCNC: 20.3 NG/ML (ref 30–100)
BASOPHILS # BLD AUTO: 0.03 THOUSANDS/ÂΜL (ref 0–0.1)
BASOPHILS NFR BLD AUTO: 1 % (ref 0–1)
EOSINOPHIL # BLD AUTO: 0.23 THOUSAND/ÂΜL (ref 0–0.61)
EOSINOPHIL NFR BLD AUTO: 4 % (ref 0–6)
ERYTHROCYTE [DISTWIDTH] IN BLOOD BY AUTOMATED COUNT: 12.3 % (ref 11.6–15.1)
HCT VFR BLD AUTO: 49.4 % (ref 36.5–49.3)
HGB BLD-MCNC: 17.2 G/DL (ref 12–17)
IMM GRANULOCYTES # BLD AUTO: 0.03 THOUSAND/UL (ref 0–0.2)
IMM GRANULOCYTES NFR BLD AUTO: 1 % (ref 0–2)
LYMPHOCYTES # BLD AUTO: 2.45 THOUSANDS/ÂΜL (ref 0.6–4.47)
LYMPHOCYTES NFR BLD AUTO: 38 % (ref 14–44)
MCH RBC QN AUTO: 29.8 PG (ref 26.8–34.3)
MCHC RBC AUTO-ENTMCNC: 34.8 G/DL (ref 31.4–37.4)
MCV RBC AUTO: 86 FL (ref 82–98)
MONOCYTES # BLD AUTO: 0.6 THOUSAND/ÂΜL (ref 0.17–1.22)
MONOCYTES NFR BLD AUTO: 9 % (ref 4–12)
NEUTROPHILS # BLD AUTO: 3.04 THOUSANDS/ÂΜL (ref 1.85–7.62)
NEUTS SEG NFR BLD AUTO: 47 % (ref 43–75)
NRBC BLD AUTO-RTO: 0 /100 WBCS
PLATELET # BLD AUTO: 270 THOUSANDS/UL (ref 149–390)
PMV BLD AUTO: 9.4 FL (ref 8.9–12.7)
RBC # BLD AUTO: 5.77 MILLION/UL (ref 3.88–5.62)
T4 FREE SERPL-MCNC: 0.84 NG/DL (ref 0.61–1.12)
TSH SERPL DL<=0.05 MIU/L-ACNC: 4.42 UIU/ML (ref 0.45–4.5)
WBC # BLD AUTO: 6.38 THOUSAND/UL (ref 4.31–10.16)

## 2024-09-07 PROCEDURE — 99244 OFF/OP CNSLTJ NEW/EST MOD 40: CPT | Performed by: FAMILY MEDICINE

## 2024-09-07 PROCEDURE — 84443 ASSAY THYROID STIM HORMONE: CPT

## 2024-09-07 PROCEDURE — 85025 COMPLETE CBC W/AUTO DIFF WBC: CPT

## 2024-09-07 PROCEDURE — 36415 COLL VENOUS BLD VENIPUNCTURE: CPT

## 2024-09-07 PROCEDURE — 82306 VITAMIN D 25 HYDROXY: CPT

## 2024-09-07 PROCEDURE — 84439 ASSAY OF FREE THYROXINE: CPT

## 2024-09-07 RX ORDER — MELOXICAM 7.5 MG/1
7.5 TABLET ORAL DAILY
Qty: 10 TABLET | Refills: 0 | Status: SHIPPED | OUTPATIENT
Start: 2024-09-07

## 2024-09-07 NOTE — LETTER
September 7, 2024     Eugenio Ridley MD  [address]  [address]  [place] [address]    Patient: Kennedy Carter   YOB: 1990   Date of Visit: 9/7/2024       Dear Dr. Ridley:    Thank you for referring Kennedy Carter to me for evaluation. Below are the relevant portions of my assessment and plan of care.         If you have questions, please do not hesitate to call me. I look forward to following Kennedy along with you.         Sincerely,        Bailey Early,         CC: No Recipients

## 2024-09-07 NOTE — PROGRESS NOTES
Assessment:     1. Chronic right shoulder pain  Ambulatory Referral to Orthopedic Surgery    meloxicam (Mobic) 7.5 mg tablet      2. Lateral epicondylitis of right elbow  Ambulatory Referral to Orthopedic Surgery    Brace    Brace    meloxicam (Mobic) 7.5 mg tablet    Ambulatory Referral to Physical Therapy      3. Biceps tendinitis of right upper extremity  meloxicam (Mobic) 7.5 mg tablet    Ambulatory Referral to Physical Therapy      4. Rotator cuff tendinitis, right  Ambulatory Referral to Physical Therapy        Orders Placed This Encounter   Procedures    Brace    Brace    Ambulatory Referral to Physical Therapy        Impression:   Right shoulder pain likely secondary to rotator cuff tendinitis/biceps tendinitis  Right elbow pain likely secondary to tennis elbow..      Conservative Management   We discussed different treatment options:  Reviewed PCP documentation completed on 08/13/2024.  PCP referred patient to orthopedics/sports medicine.  Rotator cuff tendinitis/biceps tendinitis  Ice or Heat Therapy as needed 1-2 times daily for 10-20 minutes. As tolerated.   Over the counter Tylenol and/or NSAIDs  as needed based off your Past Medical Hx. Please follow product label for dosing and maximum limits.    Provided Mobic 7.5 mg daily x 10 days.  Discussed not to take over-the-counter NSAIDs during the period of Mobic.  May take an additional Tylenol/acetaminophen as needed.  Formal Handout provided on General Information of shoulder exercises.  Please range joint through gentle range of motion as tolerated.   Initiate Home Exercise Program for Stretching and Strengthening affected area.    Initiate Formal Physical Therapy at any preferred location.  Prescription provided.      Lateral epicondylitis  Overuse injury from the forearm/wrist extensor muscles  It is also commonly known as tennis elbow    Conservative management  First-line management  Ice or Heat Therapy as needed 1-2 times daily for 10-20 minutes.  As tolerated.   Over the counter Tylenol and/or NSAIDs  as needed based off your Past Medical Hx. Please follow product label for dosing and maximum limits.  If you have concerns about utilizing Tylenol/NSAIDs please discuss with your primary care physician.  Trial of over the counter Topical Analgesics such as Lidocaine cream or Voltaren Gel, as tolerated. If skin becomes irritated, discontinue use.   Activity modification.  Ergonomic application to daily life.  Daytime bracing.  Nighttime bracing.  Daytime with counterforce bracing.  Nighttime bracing with cock up wrist splint.  Initiate Home Exercise Program for Stretching and Strengthening affected area.    Initiate Formal Physical Therapy at any preferred location.     Additional management  May consider interarticular glucocorticoid steroid if needing short-term pain relief   May consider PRP injections  Ultrasound-guided percutaneous tenotomy.  Referral to an orthopedic surgeon to discuss potential surgical management            Imaging   No imaging was available for review today.    Procedure  No Injection performed today. May consider future corticosteroid injection depending on clinical exam/imaging.      Shared decision making, patient agreeable to plan.      Return for Follow up as needed or if symptoms do NOT improve after formal physical therapy .    HPI:   Kennedy Carter is a right-hand-dominant 34 y.o. male  who presents for evaluation of   Chief Complaint   Patient presents with    Right Shoulder - Pain, Numbness     Goes down to hand (numbness) pain goes for shoulder to elbow   Shoulder and elbow goes up to 10      Referred by PCP Eugenio Ridley MD  for right shoulder pain and right elbow pain.  Occupation:   Injury Related: No     Onset/Mechanism: Right shoulder pain started 3 months ago..  Denies any direct trauma to the right shoulder.  Approximately 6 weeks to 8 weeks ago right elbow started hurting.  Location: Right shoulder: Anterior  superior..  Severity: Right shoulder: Current severity: 2-10/10.  Right elbow current severity: 3-10/10.  Pain described as: Shoulder: Sharp, burning.  Elbow: Ache, burning, temperature changes  Radiation: Elbow pain will radiate down to the hand.  Shoulder pain stays within the shoulder.  Provocative: Elbow discomfort with elbow flexion, .  Shoulder abduction, shoulder flexion, shoulder extension,, push-up  Alleviated by elbow extension.  Associated symptoms: Subjective weakness to pain.  Numbness and tingling will go down to the hand..    Denies any hx of fracture of affected limb.   Denies any surgical history of affected limb.      Summary of treatment to-date:   Topical gels      Following History Reviewed and Updated     Past Medical History:   Diagnosis Date    GERD (gastroesophageal reflux disease)     Headache(784.0) 12/27/2021    Headachs    Hyperlipidemia     Hypertension     Hypothyroid     Irregular heart beat     Thyroid disorder     Varicose vein of leg     Vertigo      Past Surgical History:   Procedure Laterality Date    IR BIOPSY BONE MARROW  3/6/2024    PILONIDAL CYST EXCISION      WISDOM TOOTH EXTRACTION       Family History   Problem Relation Age of Onset    Hypothyroidism Mother     BARBARA disease Mother     Hypertension Father     Heart disease Maternal Grandfather     Heart disease Paternal Grandfather     Arthritis Family     Heart disease Family     Hypertension Family        Social History     Substance and Sexual Activity   Alcohol Use Yes    Comment: socially     Social History     Substance and Sexual Activity   Drug Use Never     Social History     Tobacco Use   Smoking Status Never   Smokeless Tobacco Never       Social Determinants of Health     Tobacco Use: Low Risk  (9/7/2024)    Patient History     Smoking Tobacco Use: Never     Smokeless Tobacco Use: Never     Passive Exposure: Not on file   Recent Concern: Tobacco Use - Medium Risk (7/25/2024)    [facility] from [facility]    Patient History     Smoking Tobacco Use: Former     Smokeless Tobacco Use: Never     Passive Exposure: Not on file   Alcohol Use: Not At Risk (8/13/2024)    AUDIT-C     Frequency of Alcohol Consumption: Monthly or less     Average Number of Drinks: 1 or 2     Frequency of Binge Drinking: Never   Financial Resource Strain: Not on file   Food Insecurity: Not on file   Transportation Needs: Not on file   Physical Activity: Not on file   Stress: Not on file   Social Connections: Not on file   Intimate Partner Violence: Not on file   Depression: Not at risk (8/13/2024)    PHQ-2     PHQ-2 Score: 0   Housing Stability: Not on file   Utilities: Not on file   Health Literacy: Not on file        Allergies   Allergen Reactions    Penicillins Hives    Latex Rash       Review of Systems      Review of Systems     Review of Systems   Constitutional: Negative for chills and fever.   HENT: Negative for drooling and sneezing.    Eyes: Negative for redness.   Respiratory: Negative for cough and wheezing.    Gastrointestinal: Negative for vomiting.   Psychiatric/Behavioral: Negative for behavioral problems. The patient is not nervous/anxious.      All other systems negative.   Physical Exam   Physical Exam    Vitals and nursing note reviewed.  Constitutional:   Appearance. Normal Appearance.  There were no vitals taken for this visit.    There is no height or weight on file to calculate BMI.   HENT:  Head: Atraumatic.  Nose: Nose normal  Eyes: Conjunctiva/sclera: Conjunctivae normal.  Cardiovascular:   Rate and Rhythm: Bilateral equal distal pulses  Pulmonary:   Effort: Pulmonary effort is normal  Skin:   General: Skin is warm and dry.  Neurological:   General: No focal deficit present.  Mental Status: Alert and oriented to person, place, and time.   Psychiatric:   Mood and Affect: mood normal.  Behavior: Behavior normal     Musculoskeletal Exam     Ortho Exam      Right  Shoulder:     INSPECTION:  Gross deformity  Swelling Ecchymosis Increased warmth   Negative Neg.       PALPATION/TENDERNESS:  Acromion Clavicle Scapula/spine of scapula AC joint   Negative Neg. Neg. Neg.     Subacromial bursa Long head of the biceps Coracoid process Trapezius/periscapular region   Neg. Neg. Neg. Neg.     RANGE OF MOTION:  C-Spine Flexion C-Spine Extension C-Spine Sidebending C-Spine Rotation    intact intact intact intact     Internal rotation in 90 degrees Abduction External rotation in 90 degrees Abduction Internal rotation in Adduction External rotation in Adduction     lumbar spine intact      Forward Flexion Abduction   intact intact     STRENGTH:    Okay Sign Finger abduction Thumb extension   Intact, bilaterally Intact, bilaterally Intact, bilaterally       ROTATOR CUFF:  Rotator cuff tear  Supraspinatus  Infraspinatus  Subscapularis    (Drop-Arm) (Empty can) (External rotation against resistance) (Belly press)   negative Discomfort without weakness Discomfort without weakness    negative     IMPINGEMENT:  Neer's Philip-Pradip   negative negative     BICEPS TENDINOPATHY:  Resisted forward flexion  Resisted supination   (Speed's) (Yergason's):    Positive   N/a      AC JOINT:  Forced cross body adduction (Scarf cross-arm) Job's AC compression   Negative N/a      LABRUM:  Berg's: Labral Crank Test:    Negative N/a        Special test:  Spurlings    N/A         Distal Sensation  Radial Pulse   Intact Bilaterally  Present and Equal Bilaterally      Right ELBOW    INSPECTION:  Erythema Swelling Ecchymosis Increased warmth   no no no no     PASSIVE ROM:  Elbow Flexion/Extension Wrist Flexion/Extension Supination Pronation   full and symmetrical full and symmetrical intact and symmetrical intact and symmetrical     PALPATION/TENDERNESS  Medial epicondyle Lateral epicondyle Olecranon Olecranon fossa   Negative positve.  Reproducing chief complaint Neg. Neg.     Radial Head Ulnar collateral ligament (UCL) Valgus stress Varus stress  "  Negative        STRENGTH  Flexion Extension Pronation Supination   5/5 5/5       SPECIAL TEST  Yergason's Test Elbow hook test Tinel's test ulnar nerve Palpable ulnar subluxation      Neg. Neg.          Rogers Test Maudsley's Test   Resisted wrist extension in Full elbow extension Resisted long finger extension in full elbow extension   + reproducing chief complaint of the elbow  + .  Producing chief complaint of the elbow     NEUROVASCULAR:  Sensation to light touch Neurovascularly intact distally   Intact  Yes      (Test not completed if space left blank )           Procedures       Portions of the record may have been created with voice recognition software. Occasional wrong word or \"sound alike\" substitutions may have occurred due to the inherent limitations of voice recognition software. Please review the chart carefully and recognize, using context, where substitutions/typographical errors may have occurred.   "

## 2024-09-09 DIAGNOSIS — E55.9 VITAMIN D DEFICIENCY: Primary | ICD-10-CM

## 2024-10-08 ENCOUNTER — APPOINTMENT (OUTPATIENT)
Dept: LAB | Facility: CLINIC | Age: 34
End: 2024-10-08
Payer: COMMERCIAL

## 2024-10-08 ENCOUNTER — OFFICE VISIT (OUTPATIENT)
Dept: SLEEP CENTER | Facility: CLINIC | Age: 34
End: 2024-10-08
Payer: COMMERCIAL

## 2024-10-08 VITALS
BODY MASS INDEX: 35.52 KG/M2 | DIASTOLIC BLOOD PRESSURE: 80 MMHG | HEIGHT: 73 IN | WEIGHT: 268 LBS | SYSTOLIC BLOOD PRESSURE: 121 MMHG

## 2024-10-08 DIAGNOSIS — D58.2 ELEVATED HEMOGLOBIN (HCC): ICD-10-CM

## 2024-10-08 DIAGNOSIS — G47.33 OSA (OBSTRUCTIVE SLEEP APNEA): Primary | ICD-10-CM

## 2024-10-08 LAB
ALBUMIN SERPL BCG-MCNC: 4.4 G/DL (ref 3.5–5)
ALP SERPL-CCNC: 82 U/L (ref 34–104)
ALT SERPL W P-5'-P-CCNC: 31 U/L (ref 7–52)
ANION GAP SERPL CALCULATED.3IONS-SCNC: 5 MMOL/L (ref 4–13)
AST SERPL W P-5'-P-CCNC: 20 U/L (ref 13–39)
BASOPHILS # BLD AUTO: 0.03 THOUSANDS/ΜL (ref 0–0.1)
BASOPHILS NFR BLD AUTO: 1 % (ref 0–1)
BILIRUB SERPL-MCNC: 1.06 MG/DL (ref 0.2–1)
BUN SERPL-MCNC: 13 MG/DL (ref 5–25)
CALCIUM SERPL-MCNC: 9.4 MG/DL (ref 8.4–10.2)
CHLORIDE SERPL-SCNC: 103 MMOL/L (ref 96–108)
CO2 SERPL-SCNC: 29 MMOL/L (ref 21–32)
CREAT SERPL-MCNC: 1.11 MG/DL (ref 0.6–1.3)
EOSINOPHIL # BLD AUTO: 0.19 THOUSAND/ΜL (ref 0–0.61)
EOSINOPHIL NFR BLD AUTO: 3 % (ref 0–6)
ERYTHROCYTE [DISTWIDTH] IN BLOOD BY AUTOMATED COUNT: 12 % (ref 11.6–15.1)
GFR SERPL CREATININE-BSD FRML MDRD: 85 ML/MIN/1.73SQ M
GLUCOSE SERPL-MCNC: 102 MG/DL (ref 65–140)
HCT VFR BLD AUTO: 48.5 % (ref 36.5–49.3)
HGB BLD-MCNC: 17 G/DL (ref 12–17)
IMM GRANULOCYTES # BLD AUTO: 0.02 THOUSAND/UL (ref 0–0.2)
IMM GRANULOCYTES NFR BLD AUTO: 0 % (ref 0–2)
LYMPHOCYTES # BLD AUTO: 2.28 THOUSANDS/ΜL (ref 0.6–4.47)
LYMPHOCYTES NFR BLD AUTO: 39 % (ref 14–44)
MCH RBC QN AUTO: 29.3 PG (ref 26.8–34.3)
MCHC RBC AUTO-ENTMCNC: 35.1 G/DL (ref 31.4–37.4)
MCV RBC AUTO: 84 FL (ref 82–98)
MONOCYTES # BLD AUTO: 0.47 THOUSAND/ΜL (ref 0.17–1.22)
MONOCYTES NFR BLD AUTO: 8 % (ref 4–12)
NEUTROPHILS # BLD AUTO: 2.83 THOUSANDS/ΜL (ref 1.85–7.62)
NEUTS SEG NFR BLD AUTO: 49 % (ref 43–75)
NRBC BLD AUTO-RTO: 0 /100 WBCS
PLATELET # BLD AUTO: 266 THOUSANDS/UL (ref 149–390)
PMV BLD AUTO: 9.4 FL (ref 8.9–12.7)
POTASSIUM SERPL-SCNC: 3.3 MMOL/L (ref 3.5–5.3)
PROT SERPL-MCNC: 7.1 G/DL (ref 6.4–8.4)
RBC # BLD AUTO: 5.8 MILLION/UL (ref 3.88–5.62)
SODIUM SERPL-SCNC: 137 MMOL/L (ref 135–147)
WBC # BLD AUTO: 5.82 THOUSAND/UL (ref 4.31–10.16)

## 2024-10-08 PROCEDURE — 99213 OFFICE O/P EST LOW 20 MIN: CPT | Performed by: PHYSICIAN ASSISTANT

## 2024-10-08 PROCEDURE — 85025 COMPLETE CBC W/AUTO DIFF WBC: CPT

## 2024-10-08 NOTE — ASSESSMENT & PLAN NOTE
Patient was diagnosed with moderate obstructive sleep apnea.  He is using his CPAP nightly.  He finds it to be beneficial and effective.  He states he is sleeping better and feeling more rested and focused during the day.  Order was placed today for a nasal cradle mask as he would like to try this style versus nasal which he is using currently.  Advised him to continue to use his CPAP nightly and follow-up in 1 year or sooner if he has any concerns.

## 2024-10-08 NOTE — PROGRESS NOTES
Progress Note - [facility]   Kennedy Caretr 34 y.o. male   :1990, MRN: [id number]  10/8/2024          Follow Up Evaluation / Problem:     Obstructive Sleep Apnea      Thank you for the opportunity of participating in the evaluation and care of this patient in the Sleep Clinic at [facility].      HPI: Kennedy Carter is a 34 y.o. year old male.  The patient presents for follow up of obstructive sleep apnea.  He completed a home sleep study on 2024 for symptoms of loud snoring, excessive daytime sleepiness, and an elevated hemoglobin and hematocrit.  Home sleep study demonstrated at least moderate obstructive sleep apnea with an MAHI of 19.7 and oxygen aamir of 79%.  Auto CPAP was recommended and ordered.  He presents today to review compliance and effectiveness of treatment.        Current Outpatient Medications:     cholecalciferol (VITAMIN D3) 1,000 units tablet, Take 1,000 Units by mouth daily, Disp: , Rfl:     levothyroxine 75 mcg tablet, Take 1 tablet (75 mcg total) by mouth daily, Disp: 90 tablet, Rfl: 2    meloxicam (Mobic) 7.5 mg tablet, Take 1 tablet (7.5 mg total) by mouth daily, Disp: 10 tablet, Rfl: 0    sildenafil (VIAGRA) 50 MG tablet, Take 1 tablet (50 mg total) by mouth daily as needed for erectile dysfunction, Disp: 30 tablet, Rfl: 3    How likely are you to doze off or fall asleep in the following situations, in contrast to feeling just tired?  Sitting and reading: Slight chance of dozing  Watching TV: Slight chance of dozing  Sitting, inactive in a public place (e.g. a theatre or a meeting): Would never doze  As a passenger in a car for an hour without a break: Would never doze  Lying down to rest in the afternoon when circumstances permit: Moderate chance of dozing  Sitting and talking to someone: Would never doze  Sitting quietly after a lunch without alcohol: Moderate chance of dozing  In a car, while stopped for a few minutes  "in traffic: Would never doze  Total score: 6              Vitals:    10/08/24 1245   BP: 121/80   BP Location: Left arm   Patient Position: Sitting   Cuff Size: Adult   Weight: 122 kg (268 lb)   Height: 6' 1\" (1.854 m)       Body mass index is 35.36 kg/m².            EPWORTH SLEEPINESS SCORE  Total score: 6      Past History Since Last Sleep Center Visit:     Patient states he is using his CPAP nightly.  He has no major concerns or complaints regarding his treatment.  He is currently using a nasal mask but would like to try a nasal cradle style mask.  He states he is feeling more rested and focused during the day.  He is suffering from right shoulder and elbow pain which is interfering with his sleep.  He will be starting physical therapy soon to improve the symptoms.    The patient reports that he cleans the equipment appropriately and changes supplies on a regular basis.    The review of systems and following portions of the patient's history were reviewed and updated as appropriate: allergies, current medications, past family history, past medical history, past social history, past surgical history, and problem list.        OBJECTIVE  Equipment set up date: 8/29/2024, [employer] AirSense 11  PAP Pressure: Nasal AutoPAP using a lower limit of 5 cm and an upper limit of 15 cm of water pressure  Type of mask used: nasal, nasal cradle ordered today   DME Provider: [facility]  Denies aerophagia or AM headaches        Physical Exam:     General Appearance:   Alert, cooperative, no distress, appears stated age                                     ASSESSMENT / PLAN    1. KEVIN (obstructive sleep apnea)  Assessment & Plan:  Patient was diagnosed with moderate obstructive sleep apnea.  He is using his CPAP nightly.  He finds it to be beneficial and effective.  He states he is sleeping better and feeling more rested and focused during the day.  Order was placed today for a nasal cradle mask as he would like to try this style " versus nasal which he is using currently.  Advised him to continue to use his CPAP nightly and follow-up in 1 year or sooner if he has any concerns.  Orders:  -     PAP DME Resupply/Reorder           Counseling / Coordination of Care  I have spent a total time of 25 minutes on 10/08/24 in caring for this patient including Diagnostic results, Instructions for management, Importance of tx compliance, Risk factor reductions, Impressions, Counseling / Coordination of care, Documenting in the medical record, Reviewing / ordering tests, medicine, procedures  , and Obtaining or reviewing history  .      Today I reviewed the patient's compliance data.  he has been able to use the equipment 100% of all days recorded.  Average usage was 4 or more hours 90% of all days recorded.  The patient uses the equipment for an average of 6 hours and 3 minutes per night.  The estimated AHI is 1.2 abnormal breathing events per hour.  The patient feels they benefit from the use of PAP equipment and would like to continue PAP therapy.  Response to treatment has been good.  A prescription for supplies has been provided to last for the next year.    He will continue using this equipment at the settings noted above for the next 12 months.  At that timehe will then return for a routine follow-up evaluation. I have asked the patient to contact the Sleep Disorders Center if he encounters any difficulties prior to that time.    The following instructions have been given to the patient today:    Patient Instructions   Your compliance data looks great.  Please continue to use your CPAP nightly.  I placed an order for a nasal cradle style mask as you would like to try that.  If you are happy with that mask continue using it, if not, I would switch back to the nasal.  Will plan to see you back in 1 year or sooner if you have any concerns    Nursing Support:  When: Monday through Friday 7A-5PM except holidays  Where: Our direct line is [phone].     If you are having a true emergency please call 911.  In the event that the line is busy or it is after hours please leave a voice message and we will return your call.  Please speak clearly, leaving your full name, birth date, best number to reach you and the reason for your call.   Medication refills: We will need the name of the medication, the dosage, the ordering provider, whether you get a 30 or 90 day refill, and the pharmacy name and address.  Medications will be ordered by the provider only.  Nurses cannot call in prescriptions.  Please allow 7 days for medication refills.  Physician requested updates: If your provider requested that you call with an update after starting medication, please be ready to provide us the medication and dosage, what time you take your medication, the time you attempt to fall asleep, time you fall asleep, when you wake up, and what time you get out of bed.  Sleep Study Results: We will contact you with sleep study results and/or next steps after the physician has reviewed your testing.         Helga Beal PA-C  [facility]

## 2024-10-08 NOTE — PATIENT INSTRUCTIONS
Your compliance data looks great.  Please continue to use your CPAP nightly.  I placed an order for a nasal cradle style mask as you would like to try that.  If you are happy with that mask continue using it, if not, I would switch back to the nasal.  Will plan to see you back in 1 year or sooner if you have any concerns    Nursing Support:  When: Monday through Friday 7A-5PM except holidays  Where: Our direct line is [phone].    If you are having a true emergency please call 911.  In the event that the line is busy or it is after hours please leave a voice message and we will return your call.  Please speak clearly, leaving your full name, birth date, best number to reach you and the reason for your call.   Medication refills: We will need the name of the medication, the dosage, the ordering provider, whether you get a 30 or 90 day refill, and the pharmacy name and address.  Medications will be ordered by the provider only.  Nurses cannot call in prescriptions.  Please allow 7 days for medication refills.  Physician requested updates: If your provider requested that you call with an update after starting medication, please be ready to provide us the medication and dosage, what time you take your medication, the time you attempt to fall asleep, time you fall asleep, when you wake up, and what time you get out of bed.  Sleep Study Results: We will contact you with sleep study results and/or next steps after the physician has reviewed your testing.

## 2024-10-11 ENCOUNTER — TELEPHONE (OUTPATIENT)
Dept: SLEEP CENTER | Facility: CLINIC | Age: 34
End: 2024-10-11

## 2024-10-14 LAB

## 2024-10-30 ENCOUNTER — TELEPHONE (OUTPATIENT)
Dept: HEMATOLOGY ONCOLOGY | Facility: CLINIC | Age: 34
End: 2024-10-30

## 2024-10-30 DIAGNOSIS — D58.2 ELEVATED HEMOGLOBIN (HCC): Primary | ICD-10-CM

## 2024-10-30 NOTE — TELEPHONE ENCOUNTER
Left message for patient stating it looks like they only idania 2 out of 3 labs that Nicolasa Hernandez ordered for him to have completed. They missed the Carboxyhemoglobin lab test however they did release the order so I did put another order in for him to have completed. Informed patient in voicemail that this test was missed by the lab and that if he could have it completed prior to his appointment with Nicolasa that would be great.

## 2024-11-01 ENCOUNTER — TELEPHONE (OUTPATIENT)
Dept: HEMATOLOGY ONCOLOGY | Facility: CLINIC | Age: 34
End: 2024-11-01

## 2024-11-01 ENCOUNTER — APPOINTMENT (OUTPATIENT)
Dept: URGENT CARE | Age: 34
End: 2024-11-01

## 2024-11-01 NOTE — TELEPHONE ENCOUNTER
Called patient due to NO SHOW appointment, Pt states he doesn't have insurance right now and wants to schedule another appointment. Scheduled in December for OVL with Nicolasa.

## 2024-12-20 ENCOUNTER — TELEPHONE (OUTPATIENT)
Dept: HEMATOLOGY ONCOLOGY | Facility: CLINIC | Age: 34
End: 2024-12-20

## 2024-12-20 NOTE — TELEPHONE ENCOUNTER
Left msg for pt regarding missed appt today.  Provided [facility] phone# [phone] to reschedule

## 2025-04-07 ENCOUNTER — HOSPITAL ENCOUNTER (EMERGENCY)
Facility: HOSPITAL | Age: 35
Discharge: HOME | End: 2025-04-07
Attending: EMERGENCY MEDICINE
Payer: COMMERCIAL

## 2025-04-07 ENCOUNTER — APPOINTMENT (EMERGENCY)
Dept: RADIOLOGY | Facility: HOSPITAL | Age: 35
End: 2025-04-07

## 2025-04-07 VITALS
HEART RATE: 93 BPM | RESPIRATION RATE: 20 BRPM | WEIGHT: 293.21 LBS | SYSTOLIC BLOOD PRESSURE: 138 MMHG | BODY MASS INDEX: 38.86 KG/M2 | TEMPERATURE: 97.7 F | HEIGHT: 73 IN | DIASTOLIC BLOOD PRESSURE: 82 MMHG | OXYGEN SATURATION: 98 %

## 2025-04-07 DIAGNOSIS — S43.004A DISLOCATION OF RIGHT SHOULDER JOINT, INITIAL ENCOUNTER: Primary | ICD-10-CM
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
## 2025-04-07 PROCEDURE — 73030 X-RAY EXAM OF SHOULDER: CPT

## 2025-04-07 PROCEDURE — 96375 TX/PRO/DX INJ NEW DRUG ADDON: CPT

## 2025-04-07 PROCEDURE — 99152 MOD SED SAME PHYS/QHP 5/>YRS: CPT

## 2025-04-07 PROCEDURE — 99284 EMERGENCY DEPT VISIT MOD MDM: CPT | Performed by: EMERGENCY MEDICINE

## 2025-04-07 PROCEDURE — 23650 CLTX SHO DSLC W/MNPJ WO ANES: CPT | Performed by: EMERGENCY MEDICINE

## 2025-04-07 PROCEDURE — 96374 THER/PROPH/DIAG INJ IV PUSH: CPT

## 2025-04-07 PROCEDURE — 99283 EMERGENCY DEPT VISIT LOW MDM: CPT

## 2025-04-07 RX ORDER — ONDANSETRON 2 MG/ML
4 INJECTION INTRAMUSCULAR; INTRAVENOUS ONCE
Status: COMPLETED | OUTPATIENT
Start: 2025-04-07 | End: 2025-04-07

## 2025-04-07 RX ORDER — HYDROMORPHONE HCL/PF 1 MG/ML
1 SYRINGE (ML) INJECTION ONCE
Refills: 0 | Status: COMPLETED | OUTPATIENT
Start: 2025-04-07 | End: 2025-04-07

## 2025-04-07 RX ORDER — PROPOFOL 10 MG/ML
200 INJECTION, EMULSION INTRAVENOUS ONCE
Status: COMPLETED | OUTPATIENT
Start: 2025-04-07 | End: 2025-04-07

## 2025-04-07 RX ADMIN — ONDANSETRON 4 MG: 2 INJECTION INTRAMUSCULAR; INTRAVENOUS at 20:59

## 2025-04-07 RX ADMIN — PROPOFOL 150 MG: 10 INJECTION, EMULSION INTRAVENOUS at 21:54

## 2025-04-07 RX ADMIN — HYDROMORPHONE HYDROCHLORIDE 1 MG: 1 INJECTION, SOLUTION INTRAMUSCULAR; INTRAVENOUS; SUBCUTANEOUS at 20:56

## 2025-04-07 NOTE — Clinical Note
Kennedy Carter was seen and treated in our emergency department on 4/7/2025.            no using righ arm until cleared by orthopedics    Diagnosis:     Kennedy  may return to work on return date.    He may return on this date: 04/09/2025         If you have any questions or concerns, please don't hesitate to call.      Eugenio Davis MD    ______________________________           _______________          _______________  Hospital Representative                              Date                                Time

## 2025-04-08 ENCOUNTER — APPOINTMENT (OUTPATIENT)
Age: 35
End: 2025-04-08

## 2025-04-08 ENCOUNTER — OFFICE VISIT (OUTPATIENT)
Age: 35
End: 2025-04-08

## 2025-04-08 DIAGNOSIS — G89.29 CHRONIC RIGHT SHOULDER PAIN: ICD-10-CM

## 2025-04-08 DIAGNOSIS — M25.511 CHRONIC RIGHT SHOULDER PAIN: ICD-10-CM

## 2025-04-08 DIAGNOSIS — S43.014A ANTERIOR DISLOCATION OF RIGHT SHOULDER, INITIAL ENCOUNTER: Primary | ICD-10-CM

## 2025-04-08 PROCEDURE — 73020 X-RAY EXAM OF SHOULDER: CPT

## 2025-04-08 NOTE — ED PROVIDER NOTES
Time reflects when diagnosis was documented in both MDM as applicable and the Disposition within this note       Time User Action Codes Description Comment    4/7/2025 10:31 PM Eugenio Davis Add [S43.004A] Dislocation of right shoulder joint, initial encounter     4/7/2025 10:31 PM Eugenio Davis Add [W10.8XXA] Fall down stairs, initial encounter           ED Disposition       ED Disposition   Discharge    Condition   Stable    Date/Time   Mon Apr 7, 2025 10:31 PM    Comment   Kennedy Carter discharge to home/self care.                   Assessment & Plan       Medical Decision Making  Mechanical fall on stairs with shoulder pain.  X-ray was done which revealed shoulder dislocation.  Patient had conscious sedation with joint reduction successfully performed.  Patient be discharged to home.    Amount and/or Complexity of Data Reviewed  Radiology: ordered and independent interpretation performed. Decision-making details documented in ED Course.    Risk  Prescription drug management.        ED Course as of 04/08/25 1618   Mon Apr 07, 2025 2124 XR shoulder 2+ views RIGHT  Anterior shoulder dislocation, will do conscious sedation and joint reduciton       Medications   HYDROmorphone (DILAUDID) injection 1 mg (1 mg Intravenous Given 4/7/25 2056)   ondansetron (ZOFRAN) injection 4 mg (4 mg Intravenous Given 4/7/25 2059)   propofol (DIPRIVAN) 200 MG/20ML bolus injection 200 mg (150 mg Intravenous Given by Other 4/7/25 2154)       ED Risk Strat Scores                    No data recorded        SBIRT 22yo+      Flowsheet Row Most Recent Value   Initial Alcohol Screen: US AUDIT-C     1. How often do you have a drink containing alcohol? 0 Filed at: 04/07/2025 2024   2. How many drinks containing alcohol do you have on a typical day you are drinking?  0 Filed at: 04/07/2025 2024   3a. Male UNDER 65: How often do you have five or more drinks on one occasion? 0 Filed at: 04/07/2025 2024   Audit-C Score 0 Filed at: 04/07/2025 2024  "  AMBROCIO: How many times in the past year have you...    Used an illegal drug or used a prescription medication for non-medical reasons? Never Filed at: 04/07/2025 2024                            History of Present Illness       Chief Complaint   Patient presents with    Fall     Patient sonal from home, fell down wooden steps and tried to stop fall by grabbing onto railing when he felt \"tearing pain\" in RU shoulder. Difficulty moving extremity, sensation in fingers with palpable radial pulse. EMS gave 150mcg fentanyl       Past Medical History:   Diagnosis Date    GERD (gastroesophageal reflux disease)     Headache(784.0) 12/27/2021    Headachs    Hyperlipidemia     Hypertension     Hypothyroid     Irregular heart beat     Thyroid disorder     Varicose vein of leg     Vertigo       Past Surgical History:   Procedure Laterality Date    IR BIOPSY BONE MARROW  3/6/2024    PILONIDAL CYST EXCISION      WISDOM TOOTH EXTRACTION        Family History   Problem Relation Age of Onset    Hypothyroidism Mother     BARBARA disease Mother     Hypertension Father     Heart disease Maternal Grandfather     Heart disease Paternal Grandfather     Arthritis Family     Heart disease Family     Hypertension Family       Social History     Tobacco Use    Smoking status: Never    Smokeless tobacco: Never   Vaping Use    Vaping status: Never Used   Substance Use Topics    Alcohol use: Yes     Comment: socially    Drug use: Never      E-Cigarette/Vaping    E-Cigarette Use Never User       E-Cigarette/Vaping Substances    Nicotine No     THC No     CBD No     Flavoring No     Other No     Unknown No       I have reviewed and agree with the history as documented.     34-year-old male presents for evaluation of fall.  Patient was walking on the stairs when he lost his footing and fell down the cellar's on his buttocks.  Patient not strike his head, no loss of consciousness.  He states that he grabbed onto the railing and felt a pop.       History " provided by:  Patient  Fall  Associated symptoms: no abdominal pain, no back pain, no chest pain, no seizures and no vomiting        Review of Systems   Constitutional:  Negative for chills and fever.   HENT:  Negative for ear pain and sore throat.    Eyes:  Negative for pain and visual disturbance.   Respiratory:  Negative for cough and shortness of breath.    Cardiovascular:  Negative for chest pain and palpitations.   Gastrointestinal:  Negative for abdominal pain and vomiting.   Genitourinary:  Negative for dysuria and hematuria.   Musculoskeletal:  Negative for arthralgias and back pain.   Skin:  Negative for color change and rash.   Neurological:  Negative for seizures and syncope.   All other systems reviewed and are negative.          Objective       ED Triage Vitals   Temperature Pulse Blood Pressure Respirations SpO2 Patient Position - Orthostatic VS   04/07/25 2025 04/07/25 2025 04/07/25 2025 04/07/25 2025 04/07/25 2025 04/07/25 2135   97.7 °F (36.5 °C) 90 147/89 20 95 % Lying      Temp Source Heart Rate Source BP Location FiO2 (%) Pain Score    04/07/25 2025 04/07/25 2025 04/07/25 2025 -- 04/07/25 2025    Oral Monitor Right arm  10 - Worst Possible Pain      Vitals      Date and Time Temp Pulse SpO2 Resp BP Pain Score FACES Pain Rating User   04/07/25 2230 -- 93 98 % 20 138/82 -- -- VF   04/07/25 2216 -- 87 99 % 20 132/75 -- -- VF   04/07/25 2209 -- 85 94 % 20 126/70 -- -- VF   04/07/25 2203 -- 91 95 % 13 151/80 -- -- VF   04/07/25 2200 -- 91 97 % 20 133/72 -- -- VF   04/07/25 2151 -- 92 96 % 20 136/74 -- -- VF   04/07/25 2145 -- 92 98 % 18 143/79 -- -- VF   04/07/25 2135 -- 94 95 % 18 148/77 -- -- VF   04/07/25 2056 -- -- -- -- -- 10 - Worst Possible Pain -- SR   04/07/25 2025 97.7 °F (36.5 °C) 90 95 % 20 147/89 10 - Worst Possible Pain -- VF            Physical Exam    Results Reviewed       None            XR shoulder 2+ views RIGHT   ED Interpretation by Eugenio Davis MD (04/07 6817)   Primary  reviewed: successful shoulder reduction      Final Interpretation by Rey Coffman MD (04/08 0623)      No acute osseous fracture or subluxation. Successful closed reduction of previous right anterior glenohumeral dislocation noted. Small bone indentation along the superolateral right humeral head could represent bony Hill-Sachs lesion.         Computerized Assisted Algorithm (CAA) may have been used to analyze all applicable images.         Workstation performed: [id number]         XR shoulder 2+ views RIGHT   ED Interpretation by Eugenio Davis MD (04/07 2124)   Primary reviewed: anterior shoulder dislocation        Final Interpretation by Rey Coffman MD (04/08 0624)      Right anterior glenohumeral dislocation without acute displaced fracture.         Computerized Assisted Algorithm (CAA) may have been used to analyze all applicable images.         Workstation performed: [id number]             Pre-Procedural Sedation    Performed by: Eugenio Davis MD  Authorized by: Eugenio Davis MD    Consent:     Consent obtained:  Written    Consent given by:  Patient    Risks discussed:  Allergic reaction, dysrhythmia, vomiting, respiratory compromise necessitating ventilatory assistance and intubation, nausea and inadequate sedation    Alternatives discussed:  Analgesia without sedation and anxiolysis  Universal protocol:     Procedure explained and questions answered to patient or proxy's satisfaction: yes      Relevant documents present and verified: yes      Test results available and properly labeled: yes      Patient identity confirmation method:  Verbally with patient and arm band  Indications:     Sedation purpose:  Dislocation reduction    Procedure necessitating sedation performed by:  Physician performing sedation    Intended level of sedation:  Moderate (conscious sedation)  Pre-sedation assessment:     NPO status caution: urgency dictates proceeding with non-ideal NPO status      ASA classification:  "class 1 - normal, healthy patient      Neck mobility: normal      Mouth opening:  3 or more finger widths    Thyromental distance:  3 finger widths    Mallampati score:  I - soft palate, uvula, fauces, pillars visible    Pre-sedation assessments completed and reviewed: airway patency, cardiovascular function, hydration status, mental status, nausea/vomiting, pain level, respiratory function and temperature    Orthopedic injury treatment    Date/Time: 4/7/2025 9:48 PM    Performed by: Eugenio Davis MD  Authorized by: Eugenio Davis MD    Patient Location:  ED  [place] Protocol:  Procedure performed by: (JACKIE Sebastian PA-C)  Risks and benefits: risks, benefits and alternatives were discussed  Consent given by: patient  Time out: Immediately prior to procedure a \"time out\" was called to verify the correct patient, procedure, equipment, support staff and site/side marked as required.  Timeout called at: 4/7/2025 9:50 PM.  Patient understanding: patient states understanding of the procedure being performed  Patient consent: the patient's understanding of the procedure matches consent given  Procedure consent: procedure consent matches procedure scheduled  Relevant documents: relevant documents present and verified  Required items: required blood products, implants, devices, and special equipment available  Patient identity confirmed: verbally with patient, arm band and provided demographic data    Injury location:  Shoulder  Location details:  Right shoulder  Injury type:  Dislocation  Dislocation type: anterior    Chronicity:  New  Hill-Sachs deformity?: No    Neurovascular status: Neurovascularly intact    Sedation type:  Moderate (conscious) sedation (See separate Procedural Sedation form)  Manipulation performed?: Yes    Reduction method: traction and counter traction, external rotation  Skeletal traction used?: Yes    Reduction successful?: Yes    Confirmation: Reduction confirmed by x-ray    Immobilization:  " Sling  Neurovascular status: Neurovascularly intact    Procedural Sedation    Date/Time: 4/7/2025 9:48 PM    Performed by: Eugenio Davis MD  Authorized by: Eugenio Davis MD    Immediate pre-procedure details:     Reassessment: Patient reassessed immediately prior to procedure      Reviewed: vital signs      Verified: bag valve mask available, emergency equipment available, intubation equipment available, IV patency confirmed, oxygen available and suction available    Procedure details (see MAR for exact dosages):     Sedation start time:  4/7/2025 9:50 PM    Preoxygenation:  Room air    Sedation:  Propofol    Analgesia:  Hydromorphone    Intra-procedure monitoring:  Blood pressure monitoring, cardiac monitor, continuous capnometry, continuous pulse oximetry, frequent vital sign checks and frequent LOC assessments    Intra-procedure events: none      Total sedation time (minutes):  25  Post-procedure details:     Patient is stable for discharge or admission: yes      Patient tolerance:  Tolerated well, no immediate complications      ED Medication and Procedure Management   Prior to Admission Medications   Prescriptions Last Dose Informant Patient Reported? Taking?   cholecalciferol (VITAMIN D3) 1,000 units tablet  Self Yes No   Sig: Take 1,000 Units by mouth daily   levothyroxine 75 mcg tablet   No No   Sig: Take 1 tablet (75 mcg total) by mouth daily   meloxicam (Mobic) 7.5 mg tablet   No No   Sig: Take 1 tablet (7.5 mg total) by mouth daily   sildenafil (VIAGRA) 50 MG tablet   No No   Sig: Take 1 tablet (50 mg total) by mouth daily as needed for erectile dysfunction      Facility-Administered Medications: None     Discharge Medication List as of 4/7/2025 10:32 PM        CONTINUE these medications which have NOT CHANGED    Details   cholecalciferol (VITAMIN D3) 1,000 units tablet Take 1,000 Units by mouth daily, Historical Med      levothyroxine 75 mcg tablet Take 1 tablet (75 mcg total) by mouth daily,  Starting Tue 8/13/2024, Normal      meloxicam (Mobic) 7.5 mg tablet Take 1 tablet (7.5 mg total) by mouth daily, Starting Sat 9/7/2024, Normal      sildenafil (VIAGRA) 50 MG tablet Take 1 tablet (50 mg total) by mouth daily as needed for erectile dysfunction, Starting Tue 8/13/2024, Normal           No discharge procedures on file.  ED SEPSIS DOCUMENTATION   Time reflects when diagnosis was documented in both MDM as applicable and the Disposition within this note       Time User Action Codes Description Comment    4/7/2025 10:31 PM Eugenio Davis Add [S43.004A] Dislocation of right shoulder joint, initial encounter     4/7/2025 10:31 PM Eugenio Davis Add [W10.8XXA] Fall down stairs, initial encounter                  Eugenio Davis MD  04/08/25 0421

## 2025-04-08 NOTE — LETTER
April 8, 2025     Patient: Kennedy Carter  YOB: 1990  Date of Visit: 4/8/2025      To Whom it May Concern:    Kennedy Carter is under my professional care. Kennedy was seen in my office on 4/8/2025. Kennedy should remain out of work through 4/13/15, he can return to work on 4/14/25.       If you have any questions or concerns, please don't hesitate to call.         Sincerely,          Bharat Granados MD        CC: No Recipients

## 2025-04-08 NOTE — PROGRESS NOTES
:  Assessment & Plan  Anterior dislocation of right shoulder, initial encounter    1st time RIGHT shoulder dislocation s/p reduction in ED  Unable to lift arm, concern for cuff vs labral pathology   MRI RIGHT shoulder  Follow up after MRI   Can wean sling as tolerated      REASON FOR VISIT  Chief Complaint   Patient presents with    Right Shoulder - Pain       HISTORY OF PRESENT ILLNESS:  Kennedy Carter is a 34 y.o. year old right hand dominant male who presents with right shoulder pain. Patient denies history of diabetes. Patient has history of thyroid disorder. Patient reports he sustained a first time right shoulder dislocation with slip while walking down the stairs to take the trash out with his right hand holding onto the railing and feeling his shoulder pop out. DOI 04/07/2025. He was transported tot he ED where the shoulder was reduced. He presents today with anterior and lateral shoulder pain that radiates to the elbow. Denies distal paresthesias. Is taking tylenol and ibuprofen for pain. Previously saw Dr. Lawson for the right shoulder.         Past Medical and Surgical History:  Past Medical History:   Diagnosis Date    GERD (gastroesophageal reflux disease)     Headache(784.0) 12/27/2021    Headachs    Hyperlipidemia     Hypertension     Hypothyroid     Irregular heart beat     Thyroid disorder     Varicose vein of leg     Vertigo        Past Surgical History:   Procedure Laterality Date    IR BIOPSY BONE MARROW  3/6/2024    PILONIDAL CYST EXCISION      WISDOM TOOTH EXTRACTION         Medications:    Current Outpatient Medications:     cholecalciferol (VITAMIN D3) 1,000 units tablet, Take 1,000 Units by mouth daily, Disp: , Rfl:     levothyroxine 75 mcg tablet, Take 1 tablet (75 mcg total) by mouth daily, Disp: 90 tablet, Rfl: 2    meloxicam (Mobic) 7.5 mg tablet, Take 1 tablet (7.5 mg total) by mouth daily, Disp: 10 tablet, Rfl: 0    sildenafil (VIAGRA) 50 MG tablet, Take 1 tablet (50 mg total) by mouth  daily as needed for erectile dysfunction, Disp: 30 tablet, Rfl: 3  No current facility-administered medications for this visit.    Allergies:  Allergies   Allergen Reactions    Penicillins Hives    Latex Rash         PE:  Pertinent Positive Findings in shoulder exam:    Motion (AROM-PROM):     Shoulder ROM limited due to pain and feeling his shoulder wants to pop out.  Shoulder FE ARM 60, ER 40, IR hip    Sensory: Intact sensation in axillary, lateral antebrachial cutaneous, median, superficial branch radial, and ulnar nerve distributions.  Motor intact r/m/u    Vascular exam: warm and well perfused digits.     Skin: intact, no rashes or lesions.      Radiology: I independently reviewed and interpreted the images.  Radiographs: RIGHT shoulder X-Ray: glenohumeral joint reduced, Hill Sachs lesion        CC:  MD Khai Acosta Mark, MD    Scribe Attestation      I,:  Faina Negro am acting as a scribe while in the presence of the attending physician.:       I,:  Bharat Granados MD personally performed the services described in this documentation    as scribed in my presence.:

## 2025-04-14 ENCOUNTER — TELEPHONE (OUTPATIENT)
Age: 35
End: 2025-04-14

## 2025-04-14 NOTE — TELEPHONE ENCOUNTER
Caller: Kennedy CHILEL    Doctor: Dr. Granados    Reason for call: Patient called in , he unfortunately cannot return to work his arm is bad.  Can you please provide an updated work note.    He will take it form [employer]     Call back#:      Jewelry/Money (specify)/Clothing

## 2025-04-18 ENCOUNTER — HOSPITAL ENCOUNTER (OUTPATIENT)
Dept: MRI IMAGING | Facility: HOSPITAL | Age: 35
Discharge: HOME | End: 2025-04-18
Attending: ORTHOPAEDIC SURGERY

## 2025-04-18 DIAGNOSIS — S43.014A ANTERIOR DISLOCATION OF RIGHT SHOULDER, INITIAL ENCOUNTER: ICD-10-CM

## 2025-04-18 PROCEDURE — 73221 MRI JOINT UPR EXTREM W/O DYE: CPT

## 2025-04-23 NOTE — TELEPHONE ENCOUNTER
Caller: patient    Doctor: Dr. Granados    Reason for call: Patient is Self-Pay and requested the amount for of deposit for his appointment ///Explained Self-Pay Protocol and $30 deposit //Patient did applied for benefits     Call back#: [phone]

## 2025-04-28 ENCOUNTER — OFFICE VISIT (OUTPATIENT)
Age: 35
End: 2025-04-28
Payer: COMMERCIAL

## 2025-04-28 ENCOUNTER — TELEPHONE (OUTPATIENT)
Age: 35
End: 2025-04-28

## 2025-04-28 ENCOUNTER — APPOINTMENT (OUTPATIENT)
Dept: LAB | Facility: HOSPITAL | Age: 35
End: 2025-04-28
Payer: COMMERCIAL

## 2025-04-28 DIAGNOSIS — S43.431A LABRAL TEAR OF SHOULDER, RIGHT, INITIAL ENCOUNTER: Primary | ICD-10-CM

## 2025-04-28 DIAGNOSIS — S43.431A LABRAL TEAR OF SHOULDER, RIGHT, INITIAL ENCOUNTER: ICD-10-CM

## 2025-04-28 LAB
ANION GAP SERPL CALCULATED.3IONS-SCNC: 7 MMOL/L (ref 4–13)
BASOPHILS # BLD AUTO: 0.04 THOUSANDS/ÂΜL (ref 0–0.1)
BASOPHILS NFR BLD AUTO: 1 % (ref 0–1)
BUN SERPL-MCNC: 12 MG/DL (ref 5–25)
CALCIUM SERPL-MCNC: 9.4 MG/DL (ref 8.4–10.2)
CHLORIDE SERPL-SCNC: 102 MMOL/L (ref 96–108)
CO2 SERPL-SCNC: 31 MMOL/L (ref 21–32)
CREAT SERPL-MCNC: 1.24 MG/DL (ref 0.6–1.3)
EOSINOPHIL # BLD AUTO: 0.26 THOUSAND/ÂΜL (ref 0–0.61)
EOSINOPHIL NFR BLD AUTO: 4 % (ref 0–6)
ERYTHROCYTE [DISTWIDTH] IN BLOOD BY AUTOMATED COUNT: 11.7 % (ref 11.6–15.1)
GFR SERPL CREATININE-BSD FRML MDRD: 74 ML/MIN/1.73SQ M
GLUCOSE SERPL-MCNC: 122 MG/DL (ref 65–140)
HCT VFR BLD AUTO: 50.7 % (ref 36.5–49.3)
HGB BLD-MCNC: 17.2 G/DL (ref 12–17)
IMM GRANULOCYTES # BLD AUTO: 0.02 THOUSAND/UL (ref 0–0.2)
IMM GRANULOCYTES NFR BLD AUTO: 0 % (ref 0–2)
INR PPP: 0.99 (ref 0.85–1.19)
LYMPHOCYTES # BLD AUTO: 2.77 THOUSANDS/ÂΜL (ref 0.6–4.47)
LYMPHOCYTES NFR BLD AUTO: 40 % (ref 14–44)
MCH RBC QN AUTO: 29.4 PG (ref 26.8–34.3)
MCHC RBC AUTO-ENTMCNC: 33.9 G/DL (ref 31.4–37.4)
MCV RBC AUTO: 87 FL (ref 82–98)
MONOCYTES # BLD AUTO: 0.58 THOUSAND/ÂΜL (ref 0.17–1.22)
MONOCYTES NFR BLD AUTO: 8 % (ref 4–12)
NEUTROPHILS # BLD AUTO: 3.22 THOUSANDS/ÂΜL (ref 1.85–7.62)
NEUTS SEG NFR BLD AUTO: 47 % (ref 43–75)
NRBC BLD AUTO-RTO: 0 /100 WBCS
PLATELET # BLD AUTO: 283 THOUSANDS/UL (ref 149–390)
PMV BLD AUTO: 8.8 FL (ref 8.9–12.7)
POTASSIUM SERPL-SCNC: 4.5 MMOL/L (ref 3.5–5.3)
PROTHROMBIN TIME: 13.4 SECONDS (ref 12.3–15)
RBC # BLD AUTO: 5.86 MILLION/UL (ref 3.88–5.62)
SODIUM SERPL-SCNC: 140 MMOL/L (ref 135–147)
WBC # BLD AUTO: 6.89 THOUSAND/UL (ref 4.31–10.16)

## 2025-04-28 PROCEDURE — 85025 COMPLETE CBC W/AUTO DIFF WBC: CPT

## 2025-04-28 PROCEDURE — 85610 PROTHROMBIN TIME: CPT

## 2025-04-28 PROCEDURE — 80048 BASIC METABOLIC PNL TOTAL CA: CPT

## 2025-04-28 PROCEDURE — 99214 OFFICE O/P EST MOD 30 MIN: CPT | Performed by: ORTHOPAEDIC SURGERY

## 2025-04-28 PROCEDURE — 36415 COLL VENOUS BLD VENIPUNCTURE: CPT

## 2025-04-28 RX ORDER — CHLORHEXIDINE GLUCONATE ORAL RINSE 1.2 MG/ML
15 SOLUTION DENTAL ONCE
OUTPATIENT
Start: 2025-04-28 | End: 2025-04-28

## 2025-04-28 RX ORDER — DICLOFENAC SODIUM 75 MG/1
75 TABLET, DELAYED RELEASE ORAL 2 TIMES DAILY
Qty: 30 TABLET | Refills: 0 | Status: SHIPPED | OUTPATIENT
Start: 2025-04-28

## 2025-04-28 RX ORDER — SODIUM CHLORIDE, SODIUM LACTATE, POTASSIUM CHLORIDE, CALCIUM CHLORIDE 600; 310; 30; 20 MG/100ML; MG/100ML; MG/100ML; MG/100ML
125 INJECTION, SOLUTION INTRAVENOUS CONTINUOUS
OUTPATIENT
Start: 2025-04-28

## 2025-04-28 NOTE — TELEPHONE ENCOUNTER
Spoke with the patient. Provided procedure and diagnosis codes. Let him know that most relevant information for his surgery would be found in the office note which is accessible in [place]. Also let him know that we could place a note with the surgery date in his chart if needed. Sent message to PA to ask note to be placed.

## 2025-04-28 NOTE — TELEPHONE ENCOUNTER
Caller: Self    Doctor: Dr. Granados    Reason for call: Patient would like to find out what the surgery will cost. Advised patient to contact insurance and provide procedure code and DX code. Can someone provide patient with procedure code for surgery? Also patient is requesting a copy of surgery order for , can this be uploaded to his [employer]?    Call back#: [phone]

## 2025-04-28 NOTE — TELEPHONE ENCOUNTER
Caller: Kennedy     Doctor: Dr. Granados    Reason for call: Patient is calling to ask about getting forms for social security.Patient is unsure how the process works. Please advise.     Call back#: [phone]

## 2025-04-28 NOTE — TELEPHONE ENCOUNTER
Spoke with the patient. Let him know his medical clearance was scheduled and advised to have labs drawn by the day before eat the latest so they are resulted for clearance.    Re: social security, advised the patient that he could apply for SSDI or unemployment on his own and we could assist with any medical documentation he would need, but we are not directly involved with the application process.

## 2025-04-28 NOTE — PROGRESS NOTES
:  Assessment & Plan  Labral tear of shoulder, right, initial encounter      1st time RIGHT shoulder dislocation with displaced cartilage fragments and ALPSA lesion  Reviewed MRI showed ALPSA lesion, large chondral fx of anterior-inferior glenoid articular surface with displaced chondral fragments, and Hill-Sachs lesion   We discussed surgery vs non surgery, risks and benefits of both  He does have loose displaced cartilage fragments in the joint with feeling of locking   Discussed surgical procedure with patient in depth   Risks and benefits discussed regarding labrum repair   Patient is interested in surgery at this time  Surgery consent signed today   Follow up for arthroscopic RIGHT shoulder labrum repair with possible remplissage, possible loose cartilage fragment removal    Patient will get PCP clearance prior to surgery     Patient previous provided with sling, should bring day of surgery       REASON FOR VISIT  Chief Complaint   Patient presents with    Right Shoulder - Follow-up       INTERVAL Hx 4/28/25  Patient presenting today to follow up after MRI   Shows Hill-Sachs fracture, ALPSA lesion, and large chondral fx of anterior-inferior glenoid articular surface with displaced chondral fragments     Patient still experiencing pain   Having severe nighttime symptoms   Interested in surgery at this time      HISTORY OF PRESENT ILLNESS:  Kennedy Carter is a 34 y.o. year old right hand dominant male who presents with right shoulder pain. Patient denies history of diabetes. Patient has history of thyroid disorder. Patient reports he sustained a first time right shoulder dislocation with slip while walking down the stairs to take the trash out with his right hand holding onto the railing and feeling his shoulder pop out. DOI 04/07/2025.   He was transported to the ED where the shoulder was reduced. He presents today with anterior and lateral shoulder pain that radiates to the elbow. Denies distal paresthesias. Is  taking tylenol and ibuprofen for pain. Previously saw Dr. Lawson for the right shoulder.         Past Medical and Surgical History:  Past Medical History:   Diagnosis Date    GERD (gastroesophageal reflux disease)     Headache(784.0) 12/27/2021    Headachs    Hyperlipidemia     Hypertension     Hypothyroid     Irregular heart beat     Thyroid disorder     Varicose vein of leg     Vertigo        Past Surgical History:   Procedure Laterality Date    IR BIOPSY BONE MARROW  3/6/2024    PILONIDAL CYST EXCISION      WISDOM TOOTH EXTRACTION         Medications:    Current Outpatient Medications:     cholecalciferol (VITAMIN D3) 1,000 units tablet, Take 1,000 Units by mouth daily, Disp: , Rfl:     levothyroxine 75 mcg tablet, Take 1 tablet (75 mcg total) by mouth daily, Disp: 90 tablet, Rfl: 2    meloxicam (Mobic) 7.5 mg tablet, Take 1 tablet (7.5 mg total) by mouth daily, Disp: 10 tablet, Rfl: 0    sildenafil (VIAGRA) 50 MG tablet, Take 1 tablet (50 mg total) by mouth daily as needed for erectile dysfunction, Disp: 30 tablet, Rfl: 3    Allergies:  Allergies   Allergen Reactions    Penicillins Hives    Latex Rash         PE:  Pertinent Positive Findings in shoulder exam:    Motion (AROM-PROM):     Shoulder ROM limited due to pain and feeling his shoulder wants to pop out.  Shoulder FF , ER 50, IR hip  +apprehension and +relocation    Sensory: diminished sensation in axillary distributions, intact sensation in lateral antebrachial cutaneous, median, superficial branch radial, and ulnar nerve distributions.   Motor intact r/m/u    Vascular exam: warm and well perfused digits.     Skin: intact, no rashes or lesions.      Radiology: I independently reviewed and interpreted the images.  Radiographs: RIGHT shoulder X-Ray: glenohumeral joint reduced, Hill Sachs lesion      MRI right shoulder 4/18/25  Evidence of recent anterior glenohumeral joint dislocation with a small Hill-Sachs fracture (series 3 images 10-13), torn  anterior-inferior glenoid labrum consistent with ALPSA lesion (series 3 images 15-18), and large chondral fracture of the anterior-inferior glenoid articular surface with displaced chondral fragments detailed above (series 4 image 7, and images 13-14.)     Based on measurements above, this is an on track lesion      CC:  Eugenio Ridley MD No ref. provider found

## 2025-05-06 DIAGNOSIS — E03.9 ACQUIRED HYPOTHYROIDISM: ICD-10-CM

## 2025-05-06 RX ORDER — LEVOTHYROXINE SODIUM 75 UG/1
75 TABLET ORAL DAILY
Qty: 90 TABLET | Refills: 0 | Status: SHIPPED | OUTPATIENT
Start: 2025-05-06

## 2025-05-07 ENCOUNTER — ANESTHESIA EVENT (OUTPATIENT)
Age: 35
End: 2025-05-07
Payer: COMMERCIAL

## 2025-05-08 ENCOUNTER — CONSULT (OUTPATIENT)
Dept: FAMILY MEDICINE CLINIC | Facility: CLINIC | Age: 35
End: 2025-05-08
Payer: COMMERCIAL

## 2025-05-08 VITALS
DIASTOLIC BLOOD PRESSURE: 86 MMHG | RESPIRATION RATE: 16 BRPM | WEIGHT: 280 LBS | OXYGEN SATURATION: 98 % | HEIGHT: 73 IN | HEART RATE: 88 BPM | SYSTOLIC BLOOD PRESSURE: 128 MMHG | BODY MASS INDEX: 37.11 KG/M2

## 2025-05-08 DIAGNOSIS — S43.431A LABRAL TEAR OF SHOULDER, RIGHT, INITIAL ENCOUNTER: ICD-10-CM

## 2025-05-08 DIAGNOSIS — Z01.818 PREOPERATIVE EXAMINATION: Primary | ICD-10-CM

## 2025-05-08 DIAGNOSIS — E66.812 CLASS 2 OBESITY DUE TO EXCESS CALORIES WITHOUT SERIOUS COMORBIDITY WITH BODY MASS INDEX (BMI) OF 36.0 TO 36.9 IN ADULT: ICD-10-CM

## 2025-05-08 DIAGNOSIS — E55.9 VITAMIN D DEFICIENCY: ICD-10-CM

## 2025-05-08 DIAGNOSIS — E03.9 ACQUIRED HYPOTHYROIDISM: ICD-10-CM

## 2025-05-08 DIAGNOSIS — D75.1 POLYCYTHEMIA: ICD-10-CM

## 2025-05-08 DIAGNOSIS — S43.431D TEAR OF RIGHT GLENOID LABRUM, SUBSEQUENT ENCOUNTER: ICD-10-CM

## 2025-05-08 DIAGNOSIS — E66.09 CLASS 2 OBESITY DUE TO EXCESS CALORIES WITHOUT SERIOUS COMORBIDITY WITH BODY MASS INDEX (BMI) OF 36.0 TO 36.9 IN ADULT: ICD-10-CM

## 2025-05-08 DIAGNOSIS — G47.33 OSA (OBSTRUCTIVE SLEEP APNEA): ICD-10-CM

## 2025-05-08 PROCEDURE — 93000 ELECTROCARDIOGRAM COMPLETE: CPT | Performed by: FAMILY MEDICINE

## 2025-05-08 PROCEDURE — 99244 OFF/OP CNSLTJ NEW/EST MOD 40: CPT | Performed by: FAMILY MEDICINE

## 2025-05-08 NOTE — H&P (VIEW-ONLY)
Name: Kennedy Carter      : 1990      MRN: [id number]  Encounter Provider: Eugenio Ridley MD  Encounter Date: 2025   Encounter department: [facility]  :  Assessment & Plan  Preoperative examination  CPE done. ECG done and ok. Preoperative labs reviewed and ok. Blood pressure good. Medications, allergies, and social history reviewed with patient. Patient has no signs or symptoms of active infection and is medically cleared for upcoming surgery.   Orders:  •  POCT ECG    Tear of right glenoid labrum, subsequent encounter  Patient for surgery by Dr Granados on 25.       Acquired hypothyroidism  Stable. Continue levothyroxine 75 mcg qd.   Orders:  •  TSH, 3rd generation; Future  •  T4, free; Future    Polycythemia  Hgb 17.2 in 2025. Patient saw Hematology in 2024. Pt advised to donate blood on a regular basis.        Vitamin D deficiency  Recheck level.  Orders:  •  Vitamin D 25 hydroxy; Future    KEVIN (obstructive sleep apnea)  Stable. Uses CPAP every night and sleeps well.        Class 2 obesity due to excess calories without serious comorbidity with body mass index (BMI) of 36.0 to 36.9 in adult  Discussed smaller portions, healthy snacks, and regular exercise.       Labral tear of shoulder, right, initial encounter    Orders:  •  Ambulatory referral to Family Practice          Depression Screening and Follow-up Plan: Patient was screened for depression during today's encounter. They screened negative with a PHQ-2 score of 0.        History of Present Illness   Patient here for preoperative exam for right shoulder  labrum tear repair by Dr Granados on 25. Patient doing ok. No chest pain or shortness of breath. No headaches. No abdominal pain. No fever or chills. No cough or congestion. No nausea, vomiting, or diarrhea. No urinary symptoms. Patient had preoperative labs done and needs ECG.       Review of Systems   Constitutional:  Negative for chills, fatigue, fever  "and unexpected weight change.   HENT:  Negative for congestion, ear pain, postnasal drip, rhinorrhea, sinus pressure, sinus pain, sore throat and trouble swallowing.    Respiratory:  Negative for cough, chest tightness, shortness of breath and wheezing.    Cardiovascular:  Negative for chest pain.   Gastrointestinal:  Negative for abdominal pain, constipation, diarrhea, nausea and vomiting.   Musculoskeletal:  Positive for arthralgias.   Skin:  Negative for rash.   Neurological:  Negative for dizziness and headaches.   Psychiatric/Behavioral:  Negative for dysphoric mood. The patient is not nervous/anxious.        Objective   /86 (BP Location: Left arm, Patient Position: Sitting, Cuff Size: Large)   Pulse 88   Resp 16   Ht 6' 1\" (1.854 m)   Wt 127 kg (280 lb)   SpO2 98%   BMI 36.94 kg/m²      Physical Exam  Vitals and nursing note reviewed.   Constitutional:       General: He is not in acute distress.     Appearance: Normal appearance. He is obese. He is not ill-appearing.   HENT:      Right Ear: Tympanic membrane, ear canal and external ear normal.      Left Ear: Tympanic membrane, ear canal and external ear normal.      Nose: No congestion or rhinorrhea.      Mouth/Throat:      Mouth: Mucous membranes are moist.      Pharynx: Oropharynx is clear. No posterior oropharyngeal erythema.   Neck:      Vascular: No carotid bruit.   Cardiovascular:      Rate and Rhythm: Normal rate and regular rhythm.      Heart sounds: Normal heart sounds. No murmur heard.  Pulmonary:      Effort: Pulmonary effort is normal.      Breath sounds: Normal breath sounds. No wheezing or rhonchi.   Musculoskeletal:      Cervical back: Normal range of motion and neck supple. No muscular tenderness.      Right lower leg: No edema.      Left lower leg: No edema.      Comments: Hurts to raise right arm above 90 degrees   Lymphadenopathy:      Cervical: No cervical adenopathy.   Neurological:      Mental Status: He is alert. "   Psychiatric:         Mood and Affect: Mood normal.         Behavior: Behavior normal.         Thought Content: Thought content normal.         Judgment: Judgment normal.

## 2025-05-08 NOTE — PROGRESS NOTES
Name: Kennedy Carter      : 1990      MRN: [id number]  Encounter Provider: Eugenio Ridley MD  Encounter Date: 2025   Encounter department: [facility]  :  Assessment & Plan  Preoperative examination  CPE done. ECG done and ok. Preoperative labs reviewed and ok. Blood pressure good. Medications, allergies, and social history reviewed with patient. Patient has no signs or symptoms of active infection and is medically cleared for upcoming surgery.   Orders:  •  POCT ECG    Tear of right glenoid labrum, subsequent encounter  Patient for surgery by Dr Granados on 25.       Acquired hypothyroidism  Stable. Continue levothyroxine 75 mcg qd.   Orders:  •  TSH, 3rd generation; Future  •  T4, free; Future    Polycythemia  Hgb 17.2 in 2025. Patient saw Hematology in 2024. Pt advised to donate blood on a regular basis.        Vitamin D deficiency  Recheck level.  Orders:  •  Vitamin D 25 hydroxy; Future    KEIVN (obstructive sleep apnea)  Stable. Uses CPAP every night and sleeps well.        Class 2 obesity due to excess calories without serious comorbidity with body mass index (BMI) of 36.0 to 36.9 in adult  Discussed smaller portions, healthy snacks, and regular exercise.       Labral tear of shoulder, right, initial encounter    Orders:  •  Ambulatory referral to Family Practice          Depression Screening and Follow-up Plan: Patient was screened for depression during today's encounter. They screened negative with a PHQ-2 score of 0.        History of Present Illness   Patient here for preoperative exam for right shoulder  labrum tear repair by Dr Granados on 25. Patient doing ok. No chest pain or shortness of breath. No headaches. No abdominal pain. No fever or chills. No cough or congestion. No nausea, vomiting, or diarrhea. No urinary symptoms. Patient had preoperative labs done and needs ECG.       Review of Systems   Constitutional:  Negative for chills, fatigue, fever  "and unexpected weight change.   HENT:  Negative for congestion, ear pain, postnasal drip, rhinorrhea, sinus pressure, sinus pain, sore throat and trouble swallowing.    Respiratory:  Negative for cough, chest tightness, shortness of breath and wheezing.    Cardiovascular:  Negative for chest pain.   Gastrointestinal:  Negative for abdominal pain, constipation, diarrhea, nausea and vomiting.   Musculoskeletal:  Positive for arthralgias.   Skin:  Negative for rash.   Neurological:  Negative for dizziness and headaches.   Psychiatric/Behavioral:  Negative for dysphoric mood. The patient is not nervous/anxious.        Objective   /86 (BP Location: Left arm, Patient Position: Sitting, Cuff Size: Large)   Pulse 88   Resp 16   Ht 6' 1\" (1.854 m)   Wt 127 kg (280 lb)   SpO2 98%   BMI 36.94 kg/m²      Physical Exam  Vitals and nursing note reviewed.   Constitutional:       General: He is not in acute distress.     Appearance: Normal appearance. He is obese. He is not ill-appearing.   HENT:      Right Ear: Tympanic membrane, ear canal and external ear normal.      Left Ear: Tympanic membrane, ear canal and external ear normal.      Nose: No congestion or rhinorrhea.      Mouth/Throat:      Mouth: Mucous membranes are moist.      Pharynx: Oropharynx is clear. No posterior oropharyngeal erythema.   Neck:      Vascular: No carotid bruit.   Cardiovascular:      Rate and Rhythm: Normal rate and regular rhythm.      Heart sounds: Normal heart sounds. No murmur heard.  Pulmonary:      Effort: Pulmonary effort is normal.      Breath sounds: Normal breath sounds. No wheezing or rhonchi.   Musculoskeletal:      Cervical back: Normal range of motion and neck supple. No muscular tenderness.      Right lower leg: No edema.      Left lower leg: No edema.      Comments: Hurts to raise right arm above 90 degrees   Lymphadenopathy:      Cervical: No cervical adenopathy.   Neurological:      Mental Status: He is alert. "   Psychiatric:         Mood and Affect: Mood normal.         Behavior: Behavior normal.         Thought Content: Thought content normal.         Judgment: Judgment normal.

## 2025-05-08 NOTE — ASSESSMENT & PLAN NOTE
Hgb 17.2 in April 2025. Patient saw Hematology in March 2024. Pt advised to donate blood on a regular basis.

## 2025-05-08 NOTE — ASSESSMENT & PLAN NOTE
Stable. Continue levothyroxine 75 mcg qd.   Orders:  •  TSH, 3rd generation; Future  •  T4, free; Future

## 2025-05-13 RX ORDER — ACETAMINOPHEN 500 MG
1000 TABLET ORAL EVERY 6 HOURS PRN
COMMUNITY

## 2025-05-13 NOTE — PRE-PROCEDURE INSTRUCTIONS
Pre-Surgery Instructions:   Medication Instructions    acetaminophen (TYLENOL) 500 mg tablet Uses PRN- OK to take day of surgery    levothyroxine 75 mcg tablet Take day of surgery.   Medication instructions for day of surgery reviewed. Please take all instructed medications with only a sip of water.       You will receive a call one business day prior to surgery with an arrival time and hospital directions. If your surgery is scheduled on a Monday, the hospital will be calling you on the Friday prior to your surgery. If you have not heard from anyone by 8pm, please call the hospital supervisor through the hospital  at [phone]. ([place] [phone] or [place] [phone]).    Do not eat or drink anything after midnight the night before your surgery, including candy, mints, lifesavers, or chewing gum. Do not drink alcohol 24hrs before your surgery. Try not to smoke at least 24hrs before your surgery.       Follow the pre surgery showering instructions as listed in the “My Surgical Experience Booklet” or otherwise provided by your surgeon's office. Do not use a blade to shave the surgical area 1 week before surgery. It is okay to use a clean electric clippers up to 24 hours before surgery. Do not apply any lotions, creams, including makeup, cologne, deodorant, or perfumes after showering on the day of your surgery. Do not use dry shampoo, hair spray, hair gel, or any type of hair products.     No contact lenses, eye make-up, or artificial eyelashes. Remove nail polish, including gel polish, and any artificial, gel, or acrylic nails if possible. Remove all jewelry including rings and body piercing jewelry.     Wear causal clothing that is easy to take on and off. Consider your type of surgery.    Keep any valuables, jewelry, piercings at home. Please bring any specially ordered equipment (sling, braces) if indicated.    Arrange for a responsible person to drive you to and from the hospital on the day of  your surgery. Please confirm the visitor policy for the day of your procedure when you receive your phone call with an arrival time.     Call the surgeon's office with any new illnesses, exposures, or additional questions prior to surgery.    Please reference your “My Surgical Experience Booklet” for additional information to prepare for your upcoming surgery.

## 2025-05-14 ENCOUNTER — TELEPHONE (OUTPATIENT)
Age: 35
End: 2025-05-14

## 2025-05-14 NOTE — TELEPHONE ENCOUNTER
Caller: Patient    Doctor/Office: Dr Granados / [facility]    #: [phone]      What needs to be faxed: [employer].  Was faxed on  4/25 court is saying they do not have it.        ATTN to: [employer]    Fax#: [phone]    Also asking for the form to be uploaded in to my chart so he can print a copy

## 2025-05-14 NOTE — TELEPHONE ENCOUNTER
Caller: Patient     Doctor: Dr. Granados     What needs to be faxed: [employer] forms was faxed on  4/25 court is saying they do not have it.  Please also send to the patients MyChart.    Call back#: [phone]

## 2025-05-15 ENCOUNTER — TELEPHONE (OUTPATIENT)
Dept: PAIN MEDICINE | Facility: CLINIC | Age: 35
End: 2025-05-15

## 2025-05-21 ENCOUNTER — HOSPITAL ENCOUNTER (OUTPATIENT)
Age: 35
Setting detail: OUTPATIENT SURGERY
Discharge: HOME | End: 2025-05-21
Attending: ORTHOPAEDIC SURGERY | Admitting: ORTHOPAEDIC SURGERY
Payer: COMMERCIAL

## 2025-05-21 ENCOUNTER — ANESTHESIA (OUTPATIENT)
Age: 35
End: 2025-05-21
Payer: COMMERCIAL

## 2025-05-21 VITALS
RESPIRATION RATE: 24 BRPM | TEMPERATURE: 97.6 F | OXYGEN SATURATION: 93 % | HEIGHT: 73 IN | HEART RATE: 84 BPM | WEIGHT: 276.4 LBS | SYSTOLIC BLOOD PRESSURE: 156 MMHG | DIASTOLIC BLOOD PRESSURE: 94 MMHG | BODY MASS INDEX: 36.63 KG/M2

## 2025-05-21 DIAGNOSIS — S43.431D TEAR OF RIGHT GLENOID LABRUM, SUBSEQUENT ENCOUNTER: Primary | ICD-10-CM

## 2025-05-21 DIAGNOSIS — S43.014A ANTERIOR DISLOCATION OF RIGHT SHOULDER, INITIAL ENCOUNTER: ICD-10-CM

## 2025-05-21 PROCEDURE — C1713 ANCHOR/SCREW BN/BN,TIS/BN: HCPCS | Performed by: ORTHOPAEDIC SURGERY

## 2025-05-21 DEVICE — KL 1.8 FIBERTAK, SHOULDER
Type: IMPLANTABLE DEVICE | Site: SHOULDER | Status: FUNCTIONAL
Brand: ARTHREX®

## 2025-05-21 RX ORDER — OXYCODONE HYDROCHLORIDE 5 MG/1
5 TABLET ORAL EVERY 4 HOURS PRN
Qty: 20 TABLET | Refills: 0 | Status: SHIPPED | OUTPATIENT
Start: 2025-05-21 | End: 2025-05-31

## 2025-05-21 RX ORDER — ONDANSETRON 2 MG/ML
4 INJECTION INTRAMUSCULAR; INTRAVENOUS ONCE AS NEEDED
Status: DISCONTINUED | OUTPATIENT
Start: 2025-05-21 | End: 2025-05-21 | Stop reason: HOSPADM

## 2025-05-21 RX ORDER — HYDROMORPHONE HCL/PF 1 MG/ML
0.5 SYRINGE (ML) INJECTION
Status: DISCONTINUED | OUTPATIENT
Start: 2025-05-21 | End: 2025-05-21 | Stop reason: HOSPADM

## 2025-05-21 RX ORDER — LIDOCAINE HYDROCHLORIDE 10 MG/ML
INJECTION, SOLUTION EPIDURAL; INFILTRATION; INTRACAUDAL; PERINEURAL AS NEEDED
Status: DISCONTINUED | OUTPATIENT
Start: 2025-05-21 | End: 2025-05-21

## 2025-05-21 RX ORDER — FENTANYL CITRATE 50 UG/ML
INJECTION, SOLUTION INTRAMUSCULAR; INTRAVENOUS AS NEEDED
Status: DISCONTINUED | OUTPATIENT
Start: 2025-05-21 | End: 2025-05-21

## 2025-05-21 RX ORDER — ASPIRIN 325 MG
325 TABLET ORAL DAILY
Qty: 28 TABLET | Refills: 0 | Status: SHIPPED | OUTPATIENT
Start: 2025-05-21

## 2025-05-21 RX ORDER — ONDANSETRON 2 MG/ML
INJECTION INTRAMUSCULAR; INTRAVENOUS AS NEEDED
Status: DISCONTINUED | OUTPATIENT
Start: 2025-05-21 | End: 2025-05-21

## 2025-05-21 RX ORDER — FENTANYL CITRATE/PF 50 MCG/ML
25 SYRINGE (ML) INJECTION
Status: DISCONTINUED | OUTPATIENT
Start: 2025-05-21 | End: 2025-05-21 | Stop reason: HOSPADM

## 2025-05-21 RX ORDER — PROPOFOL 10 MG/ML
INJECTION, EMULSION INTRAVENOUS AS NEEDED
Status: DISCONTINUED | OUTPATIENT
Start: 2025-05-21 | End: 2025-05-21

## 2025-05-21 RX ORDER — OXYCODONE HYDROCHLORIDE 5 MG/1
5 TABLET ORAL EVERY 4 HOURS PRN
Refills: 0 | Status: DISCONTINUED | OUTPATIENT
Start: 2025-05-21 | End: 2025-05-21 | Stop reason: HOSPADM

## 2025-05-21 RX ORDER — ACETAMINOPHEN 325 MG/1
650 TABLET ORAL EVERY 6 HOURS PRN
Qty: 60 TABLET | Refills: 0 | Status: SHIPPED | OUTPATIENT
Start: 2025-05-21

## 2025-05-21 RX ORDER — MIDAZOLAM HYDROCHLORIDE 2 MG/2ML
INJECTION, SOLUTION INTRAMUSCULAR; INTRAVENOUS AS NEEDED
Status: DISCONTINUED | OUTPATIENT
Start: 2025-05-21 | End: 2025-05-21

## 2025-05-21 RX ORDER — CHLORHEXIDINE GLUCONATE ORAL RINSE 1.2 MG/ML
15 SOLUTION DENTAL ONCE
Status: COMPLETED | OUTPATIENT
Start: 2025-05-21 | End: 2025-05-21

## 2025-05-21 RX ORDER — NAPROXEN 500 MG/1
500 TABLET ORAL 2 TIMES DAILY WITH MEALS
Qty: 42 TABLET | Refills: 0 | Status: SHIPPED | OUTPATIENT
Start: 2025-05-21

## 2025-05-21 RX ORDER — SODIUM CHLORIDE, SODIUM LACTATE, POTASSIUM CHLORIDE, CALCIUM CHLORIDE 600; 310; 30; 20 MG/100ML; MG/100ML; MG/100ML; MG/100ML
125 INJECTION, SOLUTION INTRAVENOUS CONTINUOUS
Status: DISCONTINUED | OUTPATIENT
Start: 2025-05-21 | End: 2025-05-21 | Stop reason: HOSPADM

## 2025-05-21 RX ORDER — BUPIVACAINE HYDROCHLORIDE 5 MG/ML
INJECTION, SOLUTION EPIDURAL; INTRACAUDAL; PERINEURAL AS NEEDED
Status: DISCONTINUED | OUTPATIENT
Start: 2025-05-21 | End: 2025-05-21

## 2025-05-21 RX ORDER — DEXAMETHASONE SODIUM PHOSPHATE 10 MG/ML
INJECTION, SOLUTION INTRAMUSCULAR; INTRAVENOUS AS NEEDED
Status: DISCONTINUED | OUTPATIENT
Start: 2025-05-21 | End: 2025-05-21

## 2025-05-21 RX ORDER — ROCURONIUM BROMIDE 10 MG/ML
INJECTION, SOLUTION INTRAVENOUS AS NEEDED
Status: DISCONTINUED | OUTPATIENT
Start: 2025-05-21 | End: 2025-05-21

## 2025-05-21 RX ADMIN — ROCURONIUM BROMIDE 50 MG: 10 INJECTION, SOLUTION INTRAVENOUS at 14:15

## 2025-05-21 RX ADMIN — SUGAMMADEX 251 MG: 100 INJECTION, SOLUTION INTRAVENOUS at 15:33

## 2025-05-21 RX ADMIN — FENTANYL CITRATE 50 MCG: 50 INJECTION INTRAMUSCULAR; INTRAVENOUS at 14:19

## 2025-05-21 RX ADMIN — BUPIVACAINE 20 ML: 13.3 INJECTION, SUSPENSION, LIPOSOMAL INFILTRATION at 13:27

## 2025-05-21 RX ADMIN — SODIUM CHLORIDE, SODIUM LACTATE, POTASSIUM CHLORIDE, AND CALCIUM CHLORIDE 125 ML/HR: .6; .31; .03; .02 INJECTION, SOLUTION INTRAVENOUS at 13:00

## 2025-05-21 RX ADMIN — DEXAMETHASONE SODIUM PHOSPHATE 10 MG: 10 INJECTION INTRAMUSCULAR; INTRAVENOUS at 14:18

## 2025-05-21 RX ADMIN — ONDANSETRON 4 MG: 2 INJECTION INTRAMUSCULAR; INTRAVENOUS at 14:18

## 2025-05-21 RX ADMIN — MIDAZOLAM 2 MG: 1 INJECTION INTRAMUSCULAR; INTRAVENOUS at 13:21

## 2025-05-21 RX ADMIN — Medication 3000 MG: at 14:17

## 2025-05-21 RX ADMIN — FENTANYL CITRATE 50 MCG: 50 INJECTION INTRAMUSCULAR; INTRAVENOUS at 13:21

## 2025-05-21 RX ADMIN — PROPOFOL 200 MG: 10 INJECTION, EMULSION INTRAVENOUS at 14:15

## 2025-05-21 RX ADMIN — PHENYLEPHRINE HYDROCHLORIDE 30 MCG/MIN: 10 INJECTION INTRAVENOUS at 14:35

## 2025-05-21 RX ADMIN — CHLORHEXIDINE GLUCONATE 15 ML: 1.2 SOLUTION ORAL at 12:59

## 2025-05-21 RX ADMIN — LIDOCAINE HYDROCHLORIDE 50 MG: 10 INJECTION, SOLUTION EPIDURAL; INFILTRATION; INTRACAUDAL; PERINEURAL at 14:15

## 2025-05-21 RX ADMIN — BUPIVACAINE HYDROCHLORIDE 10 ML: 5 INJECTION, SOLUTION EPIDURAL; INTRACAUDAL; PERINEURAL at 13:27

## 2025-05-21 RX ADMIN — ROCURONIUM BROMIDE 10 MG: 10 INJECTION, SOLUTION INTRAVENOUS at 14:47

## 2025-05-21 NOTE — ANESTHESIA PROCEDURE NOTES
Peripheral Block    Patient location during procedure: holding area  Start time: 5/21/2025 1:27 PM  Reason for block: at surgeon's request and post-op pain management  Staffing  Performed by: Mario Kamara MD  Authorized by: Mario Kamara MD    Preanesthetic Checklist  Completed: patient identified, IV checked, site marked, risks and benefits discussed, surgical consent, monitors and equipment checked, pre-op evaluation and timeout performed  Peripheral Block  Patient position: sitting  Prep: ChloraPrep  Patient monitoring: frequent blood pressure checks, continuous pulse oximetry and heart rate  Block type: Interscalene  Laterality: right  Injection technique: single-shot  Procedures: ultrasound guided, Ultrasound guidance required for the procedure to increase accuracy and safety of medication placement and decrease risk of complications.  Ultrasound permanent image saved    Needle  Needle type: Stimuplex   Needle gauge: 20 G  Needle length: 4 in  Needle localization: anatomical landmarks and ultrasound guidance  Assessment  Injection assessment: incremental injection, frequent aspiration, injected with ease, negative aspiration, negative for heart rate change, no paresthesia on injection, no symptoms of intraneural/intravenous injection and needle tip visualized at all times  Paresthesia pain: none  Post-procedure:  site cleaned  patient tolerated the procedure well with no immediate complications

## 2025-05-21 NOTE — ANESTHESIA POSTPROCEDURE EVALUATION
Post-Op Assessment Note    CV Status:  Stable  Pain Score: 0    Pain management: adequate       Mental Status:  Alert and awake   Hydration Status:  Euvolemic   PONV Controlled:  Controlled   Airway Patency:  Patent     Post Op Vitals Reviewed: Yes    No anethesia notable event occurred.    Staff: Anesthesiologist, CRNA           Last Filed PACU Vitals:  Vitals Value Taken Time   Temp 97.7 °F (36.5 °C) 05/21/25 15:48   Pulse 80 05/21/25 16:32   /79 05/21/25 16:30   Resp 24 05/21/25 16:32   SpO2 96 % 05/21/25 16:32   Vitals shown include unfiled device data.    Modified Marlin:     Vitals Value Taken Time   Activity 2 05/21/25 16:00   Respiration 2 05/21/25 16:00   Circulation 2 05/21/25 16:00   Consciousness 1 05/21/25 16:00   Oxygen Saturation 2 05/21/25 16:00     Modified Marlin Score: 9

## 2025-05-21 NOTE — OP NOTE
OPERATIVE REPORT  PATIENT NAME: Kennedy Carter    :  1990  MRN: [id number]  Pt Location: [facility] OR ROOM 05    SURGERY DATE: 2025    Surgeons and Role:     * Bharat Granados MD - Primary     * Abi Sexton PA-C - Assisting      Estimated Blood Loss:   Minimal    Anesthesia Type:   General with regional        Pre-Operative Diagnosis:    RIGHT shoulder anterior labral tear, ALPSA, small-non engaging Hill Sachs lesion    Procedure:    RIGHT shoulder anterior labral repair    Indications for Surgery:  Kennedy Carter is a 35 y.o. y.o. is a pleasant patient with a history of a RIGHT shoulder dislocation. The patient is very active and had persistent apprehension. MRI showed an ALPSA injury pattern with displaced cartilage fragments in the joint. The patient wished to return to athletic activity and wanted a reduced risk of dislocation in the future. We discussed operative and nonoperative treatment.  The patient consented to surgery.       Post-Operative Diagnosis:    RIGHT shoulder shoulder anterior labral tear, ALPSA, small-non engaging Hill Sachs lesion      Complications: None    Disposition: PACU    Full Description of the Operation/Procedure:  Kennedy Mason seen in preop holding area. The RIGHT upper extremity was marked, and signed informed consent was taken. All risks and benefits of procedure were explained. Patient was brought to the operating, placed supine on the operating table in the beach chair position. Anesthesia was inducted.  The operative shoulder was prepped and draped in a sterile manner. A surgical timeout was performed.    A standard posterior portal was made and the arthroscope was introduced into the shoulder. An anterior portal was made under direct visualization.    Evaluation of RIGHT shoulder demonstrated:    Humeral cartilage: small non-engaging Hill Sachs  Glenoid cartilage: displaced anterior inferior cartilage flap  Biceps anchor/superior labrum: minimal fraying  Anterior labrum:  ALPSA from 12 o'clock to 5 o'clock, this was displaced medially and extensively scarred into the anterior capsular tissues. In addition, the remaining displaced labrum was fragmented and torn into multiple pieces completely detached from the labrum.   Inferior labrum: normal   Posterior labrum: normal   Subscapularis:normal   MGHL:this was displaced medially with the ALPSA  Biceps tendon: synovitis  Rotator cuff: intact  The anterior interval was comprised of thick, dense scar tissue. I debrided this using a radiofrequency ablator.     Anterior Labral Repair ([id number])  The anterior labrum was debrided using an elevator. I mobilized the ALPSA lesion and moved it medially, freeing it up from the remaining anterior tissues. The labrum was torn in a complex pattern with multiple fragmented edges. A percutaneous portal was made just above the subscapularis which was localized with a spinal needle. The Fibertak sleeve was introduced into the joint and placed onto the anteroinferior edge of the glenoid. This was drilled and the anchor was placed. The anchor felt secure with excellent fixation. A suture passer was placed beneath the labrum at the level of the anchor and retrieved out of the anterior portal. Using the knotless mechanism, the suture was passed around the labrum and tightly secured, bringing the labrum onto the glenoid. This process was repeated with 4 more anchors. I repaired the displaced glenoid flap back to the glenoid and also repaired the multiple fragments of labral tissue that were displaced.  I also freed up some of the MGHL off of the subscapularis and incorporated this into the repair. The labrum appeared well fixed.      At this point, the arthroscope was then removed. The portals were closed with 3-0 monocryl. Sterile dressings were applied. The patient was placed into a shoulder immobilizer.    The patient was extubated and taken to recovery in stable condition. The patient was given a followup  appointment, and appropriate pain medication and was discharged in stable condition.        Components of added complexity:  A 22 modifier was used in this case due to the components of added complexity. These included a very large, displaced ALPSA lesion with extensive scar tissue, with significant retraction medially. This required additional time for release and mobilization of the labrum and also required additional anchors to repair. This took additional time and effort, approximately 40% more than a standard labral repair case.         SIGNATURE: Bharat Granados MD  DATE: May 21, 2025  TIME: 3:32 PM

## 2025-05-21 NOTE — INTERVAL H&P NOTE
H&P reviewed. After examining the patient I find no changes in the patients condition since the H&P had been written.    Vitals:    05/21/25 1243   BP: 161/83   Pulse: 76   Resp: 21   Temp: (!) 97.1 °F (36.2 °C)   SpO2: 98%

## 2025-05-21 NOTE — ANESTHESIA PREPROCEDURE EVALUATION
Procedure:  REPAIR LABRUM WITH POSSIBLE REMPLISSAGE (Right: Shoulder)    Relevant Problems   CARDIO   (+) Varicose veins of both lower extremities      ENDO   (+) Hypothyroidism      GI/HEPATIC   (+) Gastroesophageal reflux disease without esophagitis      NEURO/PSYCH   (+) Chronic nonintractable headache   (+) Chronic right shoulder pain   (+) GAGANDEEP (generalized anxiety disorder)      PULMONARY   (+) KEVIN (obstructive sleep apnea)      BMI 37    Physical Exam    Airway     Mallampati score: II  TM Distance: >3 FB  Neck ROM: full      Cardiovascular  Cardiovascular exam normal    Dental       Pulmonary  Pulmonary exam normal     Neurological      Other Findings        Anesthesia Plan  ASA Score- 3     Anesthesia Type- general with ASA Monitors.         Additional Monitors:     Airway Plan:     Comment: + PNB.       Plan Factors-    Chart reviewed. EKG reviewed.  Existing labs reviewed. Patient summary reviewed.                  Induction-     Postoperative Plan- .   Monitoring Plan - Monitoring plan - standard ASA monitoring          Informed Consent- Anesthetic plan and risks discussed with patient.  I personally reviewed this patient with the CRNA. Discussed and agreed on the Anesthesia Plan with the CRNA..      NPO Status:  No vitals data found for the desired time range.

## 2025-05-21 NOTE — DISCHARGE INSTR - AVS FIRST PAGE
[facility] Patient  Information      Home Instructions after Shoulder Arthroscopy             MD MAKENNA DamonA  Orthopaedic Surgery   [facility]  [address], [place] [address]    The following instructions are to be used for the first week after surgery or until you return to see the physician. If you have any questions, you can contact the office at [phone]. The most important concerns in the first week are controlling the inflammation and pain.      Icing your shoulder Use ice bags applied around your shoulder for 20-30 minutes every 3-4 hours. Make sure the ice is not applied directly to the skin.    Caring for your dressing You may remove the bulky dressing 3-4 days after surgery and replace it with 1-2 layers of clean gauze over the incision. This dressing is usually wet and bloody due to  arthroscopic fluid leaking out of your shoulder.    Bathing/ Showering Do not get your incision wet until after your first post-operative visit. You may bathe, but make sure the dressing and wound  stays dry.  Do not soak the shoulder, swim, or use a hot tub until given permission by Dr. Granados.      Wearing your sling Wear your sling at all times, except when doing your prescribed exercises. The purpose of the arm sling is for comfort as well  as protection.    Home Exercises Remove your sling to perform only the range of motion exercises described below. It is helpful to use the lceMan after the exercises. You may start the exercises the first day after surgery. If you are in too much pain, you may wait until your first postoperative visit to start the exercises.       POSTOPERATIVE HOME EXERCISES    You can lean over and let your arm hang down so that you can get to your armpit. See the diagram below                You may do pendulum exercises. Pendulum exercises should be done 2 times a day for about 30 seconds in each direction. It is important to relax your  shoulder during this exercise and you should be bent to 90 degrees at the waist. The rest of your body should not move at all during this exercise. See diagram below.                   Taking your medication If you have been given a prescription for narcotic pain pills (oxycodone), please use them only for pain on the schedule and in the dosage indicated.    If you have been given a prescription for Naprosyn, a non steroidal anti-inflammatory drug (NSAID), you are to begin taking this the morning of the day after surgery. This medication is designed to help reduce your need for the narcotic pain medicine and assist in making you more comfortable. You may also take Tylenol for pain. Do NOT take any additional pain related medications you may have such as Advil (Ibuprofen), or Aleve (Naproxen Sodium), etc. unless instructed to do so.    You are to begin taking Aspirin once daily starting the  the day after your surgery. This medication is designed to help reduce your risk for a blood clot (DVT). Please make sure to complete all 4 weeks of Aspirin even if pain subsides.     4. DO NOT take Aspirin or Naprosyn if you are already on a blood thinner.         Follow-Up You should have already been scheduled for a follow-up  appointment to see Dr. Granados in his office. If not, please call [phone].   Reasons for Concern             If you have progressive pain that does not respond to pain  medication, a temperature over 101.5, excessive drainage, or decreased sensation in your hand, contact the office at [phone].    Call 911 or go to the Emergency Room immediately if you experience any CHEST PAIN or SHORTNESS OF BREATH, as these symptoms can be a sign of a life threatening  condition.

## 2025-05-22 DIAGNOSIS — E03.9 ACQUIRED HYPOTHYROIDISM: ICD-10-CM

## 2025-05-22 RX ORDER — LEVOTHYROXINE SODIUM 75 UG/1
75 TABLET ORAL DAILY
Qty: 90 TABLET | Refills: 0 | Status: SHIPPED | OUTPATIENT
Start: 2025-05-22

## 2025-05-29 ENCOUNTER — OFFICE VISIT (OUTPATIENT)
Age: 35
End: 2025-05-29

## 2025-05-29 DIAGNOSIS — Z98.890 STATUS POST LABRAL REPAIR OF SHOULDER: Primary | ICD-10-CM

## 2025-05-29 PROCEDURE — 99024 POSTOP FOLLOW-UP VISIT: CPT | Performed by: ORTHOPAEDIC SURGERY

## 2025-06-02 ENCOUNTER — EVALUATION (OUTPATIENT)
Dept: PHYSICAL THERAPY | Facility: REHABILITATION | Age: 35
End: 2025-06-02
Payer: COMMERCIAL

## 2025-06-02 DIAGNOSIS — S43.431D TEAR OF RIGHT GLENOID LABRUM, SUBSEQUENT ENCOUNTER: Primary | ICD-10-CM

## 2025-06-02 PROCEDURE — 97161 PT EVAL LOW COMPLEX 20 MIN: CPT

## 2025-06-02 NOTE — PROGRESS NOTES
PT Evaluation     Today's date: 2025  Patient name: Kennedy Carter  : 1990  MRN: [id number]  Referring provider: Abi Sexton PA-C  Dx:   Encounter Diagnosis     ICD-10-CM    1. Tear of right glenoid labrum, subsequent encounter  S43.431D           Start Time: 1115  Stop Time: 1145  Total time in clinic (min): 30 minutes    Assessment  Impairments: abnormal coordination, abnormal muscle firing, abnormal or restricted ROM, activity intolerance, impaired balance, impaired physical strength, lacks appropriate home exercise program, pain with function, weight-bearing intolerance, poor body mechanics, unable to perform ADL, participation limitations, activity limitations and endurance    Assessment details: Kennedy Carter is a 35 y.o. male presenting s/p right labral repair performed by Dr. Granados on 2025. Primary impairments include limited ROM, strength and pain. Will benefit from skilled PT interventions for these impairments to return to PLOF. HEP was provided and reviewed. Patient is able to complete HEP with good technique and appropriate pain response. Patient expressed understanding of appropriate dosage and frequency of HEP and was instructed to discontinue exercises if symptoms are exacerbated. No additional referral necessary. 1:1 with Jyoti Fierro, PT, DPT for entirety of session.     Understanding of Dx/Px/POC: good     Prognosis: good    Goals    Short Term Goals:  In 2 weeks, the patient will:  1. Decrease worst pain by 1 point subjectively  2. Increase shoulder flexion to 90 degrees   3. Supervision with HEP for self care    Long Term Goals:  In 12 weeks, the patient will:  1. Increase right shoulder strength to with in 80% of left side  2. FOTO to greater than predicted value  3. Independent with HEP for selfcare        Plan  Patient would benefit from: skilled physical therapy and PT eval  Planned modality interventions: biofeedback, electrical stimulation/Russian stimulation, TENS,  neuromuscular electric stimulation and unattended electrical stimulation    Planned therapy interventions: abdominal trunk stabilization, activity modification, ADL retraining, ADL training, balance, balance/weight bearing training, behavior modification, body mechanics training, functional ROM exercises, flexibility, fine motor coordination training, gait training, graded activity, graded exercise, graded motor, home exercise program, therapeutic training, therapeutic exercise, therapeutic activities, postural training, self care, strengthening, stretching, patient/caregiver education, motor coordination training, muscle pump exercises, nerve gliding, neuromuscular re-education, manual therapy, joint mobilization and kinesiology taping    Frequency: 2x week  Duration in weeks: 12  Plan of Care beginning date: 6/2/2025  Plan of Care expiration date: 8/25/2025  Treatment plan discussed with: patient        Subjective  Diagnosis: right labral repair   DONY: falling down steps 4/7/2024  DOI: 5/21 surgery   Limitations: in sling  Aggravating factors: sleeping has been very difficult  Easing factors: pain medication (naproxen and tylenol), ice pack 2-3 times per day  PSHx: n/a  Pain: doing pendulums Best-  0/10, Worst-  8/10, Current-  1-2/10  PLOF: fully functional  Goals: get back to gym, get back to baseball    Objective    Strength and ROM evaluated B from a regional biomechanical perspective and values relevant to this episode recorded in table below    ROM: Goniometric measurement revealed the following findings.  Shoulder ROM Right Left   Flexion wfl wfl   Abduction wfl wfl   ER wfl wfl   IR wfl Wfl           Strength: MMT revealed the following findings.  Joint Motion Right Left   Sh. Flexion  5/5   Sh. Abduction  5/5   Sh. ER  5/5   Sh. IR  5/5   Shoulder extension  5/5   Shoulder horizontal ABD  5/5                 Precautions:       POC expires Unit limit Auth Expiration date PT/OT + Visit Limit?   8/25/2025  BOMN N/a BOMN         Visit/Unit Tracking  AUTH Status:  Date 6/2        N/a Used 1         Remaining             Pertinent Findings:      POC End Date: 8/25/2025                                                                                          Test / Measure  6/2   FOTO (Predicted 71) 49                   Manuals 6/2                                                                Neuro Re-Ed                                                                                                        Ther Ex                                                                                                                     Ther Activity                                       Gait Training                                       Modalities                                        Post-op RIGHT shoulder s/p arthroscopic labrum repair.   Start PT 5-7 days post-op   Labral Repair Protocol     Phase 1 0-6 Weeks   0-2 Weeks: No shoulder motion, may do wrist, hand, finger motion   2-4 Weeks: May do pendulums, wrist, hand finger motion Remain in sling at all times; may remove for showering/exercises starting at 2 weeks Restrict motion to 90°FF/ 20° ER at side/ IR to stomach/ 45° ABO, PROMaAAROMaAROM as tolerated     Phase 2 6-12 Weeks Begin to wean sling at 5-6 weeks based on MD clearance (may use during daytime only at 4 weeks) Begin AAROM then AROM of elbow Rotator cuff and deltoid isometrics at 8 weeks Begin scapular exercises     Phase 3 12-16 Weeks Advance to full ROM as tolerated Emphasize ER and latissimus eccentrics, glenohumeral stabilization Begin upper body endurance activities Cycling and running ok at 12 weeks Full overhead throwing at 16 weeks     Phase 4 4-5 Months Aggressive scapular stabilization and eccentric strengthening Begin plyometric and throwing program Maintain full motion

## 2025-06-04 ENCOUNTER — OFFICE VISIT (OUTPATIENT)
Dept: PHYSICAL THERAPY | Facility: REHABILITATION | Age: 35
End: 2025-06-04
Payer: COMMERCIAL

## 2025-06-04 DIAGNOSIS — S43.431D TEAR OF RIGHT GLENOID LABRUM, SUBSEQUENT ENCOUNTER: Primary | ICD-10-CM

## 2025-06-04 PROCEDURE — 97110 THERAPEUTIC EXERCISES: CPT

## 2025-06-04 PROCEDURE — 97140 MANUAL THERAPY 1/> REGIONS: CPT

## 2025-06-04 PROCEDURE — 97112 NEUROMUSCULAR REEDUCATION: CPT

## 2025-06-04 NOTE — PROGRESS NOTES
"Daily Note     Today's date: 2025  Patient name: Kennedy Carter  : 1990  MRN: [id number]  Referring provider: Abi Sexton PA-C  Dx:   Encounter Diagnosis     ICD-10-CM    1. Tear of right glenoid labrum, subsequent encounter  S43.431D           Start Time: 1405  Stop Time: 1443  Total time in clinic (min): 38 minutes    Subjective: Pt notes no new changes since initial evaluation.       Objective: See treatment diary below      Assessment: Strengthening and ROM initiated this session without increase in symptoms. Tolerated treatment well. Patient would benefit from continued PT. 1:1 with Jyoti Fierro DPT for entirety of session.         Plan: Continue per plan of care.      Precautions: Surgery on 2025      POC expires Unit limit Auth Expiration date PT/OT + Visit Limit?   2025 BOMN N/a BOMN         Visit/Unit Tracking  AUTH Status:  Date        N/a Used 1 2        Remaining             Pertinent Findings:      POC End Date: 2025                                                                                          Test / Measure     FOTO (Predicted 71) 49                   Manuals            PROM   TT                                                  Neuro Re-Ed             Shrugs   20x5\"            Scap Retractions              Wrist Curl   2xF 5#            Wrist Ext  2xF 5#            Gripper   Black Gripper 20x5\"            Hammer Pro/Sup  2x10 2#                         Ther Ex             Pendulums Fwd/lat/cir  20x ea            Table Slides F  20x5\"                                                                                          Ther Activity                                       Gait Training                                       Modalities             Ice                           Post-op RIGHT shoulder s/p arthroscopic labrum repair.   Start PT 5-7 days post-op   Labral Repair Protocol     Phase 1 0-6 Weeks   0-2 Weeks: No shoulder motion, may do " wrist, hand, finger motion   2-4 Weeks: May do pendulums, wrist, hand finger motion Remain in sling at all times; may remove for showering/exercises starting at 2 weeks Restrict motion to 90°FF/ 20° ER at side/ IR to stomach/ 45° ABO, PROMaAAROMaAROM as tolerated     Phase 2 6-12 Weeks Begin to wean sling at 5-6 weeks based on MD clearance (may use during daytime only at 4 weeks) Begin AAROM then AROM of elbow Rotator cuff and deltoid isometrics at 8 weeks Begin scapular exercises     Phase 3 12-16 Weeks Advance to full ROM as tolerated Emphasize ER and latissimus eccentrics, glenohumeral stabilization Begin upper body endurance activities Cycling and running ok at 12 weeks Full overhead throwing at 16 weeks     Phase 4 4-5 Months Aggressive scapular stabilization and eccentric strengthening Begin plyometric and throwing program Maintain full motion

## 2025-06-09 ENCOUNTER — APPOINTMENT (OUTPATIENT)
Dept: PHYSICAL THERAPY | Facility: REHABILITATION | Age: 35
End: 2025-06-09
Payer: COMMERCIAL

## 2025-06-11 ENCOUNTER — OFFICE VISIT (OUTPATIENT)
Dept: PHYSICAL THERAPY | Facility: REHABILITATION | Age: 35
End: 2025-06-11
Payer: COMMERCIAL

## 2025-06-11 DIAGNOSIS — S43.431D TEAR OF RIGHT GLENOID LABRUM, SUBSEQUENT ENCOUNTER: Primary | ICD-10-CM

## 2025-06-11 PROCEDURE — 97110 THERAPEUTIC EXERCISES: CPT

## 2025-06-11 PROCEDURE — 97112 NEUROMUSCULAR REEDUCATION: CPT

## 2025-06-11 PROCEDURE — 97140 MANUAL THERAPY 1/> REGIONS: CPT

## 2025-06-11 NOTE — PROGRESS NOTES
"Daily Note     Today's date: 2025  Patient name: Kennedy Carter  : 1990  MRN: [id number]  Referring provider: Abi Sexton PA-C  Dx:   Encounter Diagnosis     ICD-10-CM    1. Tear of right glenoid labrum, subsequent encounter  S43.431D             Start Time: 1145  Stop Time: 1228  Total time in clinic (min): 43 minutes    Subjective: Pt notes recent increase in pain. Most pain is at night and patient has been having trouble sleeping. Endorses continued sling use and no active use of arm.       Objective: See treatment diary below      Assessment: Strengthening and ROM initiated this session without increase in symptoms. Tolerated treatment well. Patient would benefit from continued PT. 1:1 with Jyoti Fierro DPT for entirety of session.         Plan: Continue per plan of care.      Precautions: Surgery on 2025      POC expires Unit limit Auth Expiration date PT/OT + Visit Limit?   2025 BOMN N/a BOMN         Visit/Unit Tracking  AUTH Status:  Date       N/a Used 1 2 3       Remaining             Pertinent Findings:      POC End Date: 2025                                                                                          Test / Measure     FOTO (Predicted 71) 49                   Manuals           PROM   TT                                                  Neuro Re-Ed             Shrugs   20x5\"  20x5\"           Scap Retractions              Wrist Curl   2xF 5#  2x20 5#          Wrist Ext  2xF 5#  2x20 5#           Gripper   Black Gripper 20x5\"  Black Gripper 20x5\"           Hammer Pro/Sup  2x10 2#  2x10 2#                                    Ther Ex             Pendulums Fwd/lat/cir  20x ea  20x ea           Table Slides F  20x5\"  8x5\" P!           AROM Arm F   2x20                                                                           Ther Activity                                       Gait Training                                       Modalities        "      Ice                           Post-op RIGHT shoulder s/p arthroscopic labrum repair.   Start PT 5-7 days post-op   Labral Repair Protocol     Phase 1 0-6 Weeks   0-2 Weeks: No shoulder motion, may do wrist, hand, finger motion   2-4 Weeks: May do pendulums, wrist, hand finger motion Remain in sling at all times; may remove for showering/exercises starting at 2 weeks Restrict motion to 90°FF/ 20° ER at side/ IR to stomach/ 45° ABO, PROMaAAROMaAROM as tolerated     Phase 2 6-12 Weeks Begin to wean sling at 5-6 weeks based on MD clearance (may use during daytime only at 4 weeks) Begin AAROM then AROM of elbow Rotator cuff and deltoid isometrics at 8 weeks Begin scapular exercises     Phase 3 12-16 Weeks Advance to full ROM as tolerated Emphasize ER and latissimus eccentrics, glenohumeral stabilization Begin upper body endurance activities Cycling and running ok at 12 weeks Full overhead throwing at 16 weeks     Phase 4 4-5 Months Aggressive scapular stabilization and eccentric strengthening Begin plyometric and throwing program Maintain full motion

## 2025-06-12 ENCOUNTER — OFFICE VISIT (OUTPATIENT)
Dept: PHYSICAL THERAPY | Facility: REHABILITATION | Age: 35
End: 2025-06-12
Payer: COMMERCIAL

## 2025-06-12 DIAGNOSIS — S43.431D TEAR OF RIGHT GLENOID LABRUM, SUBSEQUENT ENCOUNTER: Primary | ICD-10-CM

## 2025-06-12 PROCEDURE — 97112 NEUROMUSCULAR REEDUCATION: CPT

## 2025-06-12 PROCEDURE — 97110 THERAPEUTIC EXERCISES: CPT

## 2025-06-12 NOTE — PROGRESS NOTES
"Daily Note     Today's date: 2025  Patient name: Kennedy Carter  : 1990  MRN: [id number]  Referring provider: Abi Sexton PA-C  Dx:   Encounter Diagnosis     ICD-10-CM    1. Tear of right glenoid labrum, subsequent encounter  S43.431D           Start Time: 947  Stop Time: 1025  Total time in clinic (min): 38 minutes    Subjective: Patient states that sleeping has been painful. States that he has been using ice as needed to manage pain.       Objective: See treatment diary below      Assessment: Tolerated treatment well. Patient exhibited good technique with therapeutic exercises and would benefit from continued PT. Patient seen 1:1 with PTs from 947-1015.        Plan: Continue per plan of care.  Progress treament per protocol.      Precautions: Surgery on 2025      POC expires Unit limit Auth Expiration date PT/OT + Visit Limit?   2025 BOMN N/a BOMN         Visit/Unit Tracking  AUTH Status:  Date      N/a Used 1 2 3       Remaining             Pertinent Findings:      POC End Date: 2025                                                                                          Test / Measure     FOTO (Predicted 71) 49                   Manuals          PROM   TT  SW                                                Neuro Re-Ed             Shrugs   20x5\"  20x5\"  20 x5\"          Scap Retractions              Wrist Curl   2xF 5#  2x20 5# 2x20 5#         Wrist Ext  2xF 5#  2x20 5#  2x20 5#          Gripper   Black Gripper 20x5\"  Black Gripper 20x5\"  Black Gripper 20x5\"         Hammer Pro/Sup  2x10 2#  2x10 2# 2x10 2#                                   Ther Ex             Pendulums Fwd/lat/cir  20x ea  20x ea  20x          Table Slides F  20x5\"  8x5\" P!  X15 5\"          AROM Arm F   2x20                                                                           Ther Activity                                       Gait Training                                     "   Modalities             Ice                           Post-op RIGHT shoulder s/p arthroscopic labrum repair.   Start PT 5-7 days post-op   Labral Repair Protocol     Phase 1 0-6 Weeks   0-2 Weeks: No shoulder motion, may do wrist, hand, finger motion   2-4 Weeks: May do pendulums, wrist, hand finger motion Remain in sling at all times; may remove for showering/exercises starting at 2 weeks Restrict motion to 90°FF/ 20° ER at side/ IR to stomach/ 45° ABO, PROMaAAROMaAROM as tolerated     Phase 2 6-12 Weeks Begin to wean sling at 5-6 weeks based on MD clearance (may use during daytime only at 4 weeks) Begin AAROM then AROM of elbow Rotator cuff and deltoid isometrics at 8 weeks Begin scapular exercises     Phase 3 12-16 Weeks Advance to full ROM as tolerated Emphasize ER and latissimus eccentrics, glenohumeral stabilization Begin upper body endurance activities Cycling and running ok at 12 weeks Full overhead throwing at 16 weeks     Phase 4 4-5 Months Aggressive scapular stabilization and eccentric strengthening Begin plyometric and throwing program Maintain full motion

## 2025-06-16 ENCOUNTER — OFFICE VISIT (OUTPATIENT)
Dept: PHYSICAL THERAPY | Facility: REHABILITATION | Age: 35
End: 2025-06-16
Payer: COMMERCIAL

## 2025-06-16 DIAGNOSIS — S43.431D TEAR OF RIGHT GLENOID LABRUM, SUBSEQUENT ENCOUNTER: Primary | ICD-10-CM

## 2025-06-16 PROCEDURE — 97112 NEUROMUSCULAR REEDUCATION: CPT

## 2025-06-16 PROCEDURE — 97110 THERAPEUTIC EXERCISES: CPT

## 2025-06-16 NOTE — PROGRESS NOTES
"Daily Note     Today's date: 2025  Patient name: Kennedy Carter  : 1990  MRN: [id number]  Referring provider: Abi Sexton PA-C  Dx:   Encounter Diagnosis     ICD-10-CM    1. Tear of right glenoid labrum, subsequent encounter  S43.431D             Start Time: 1145  Stop Time: 1217  Total time in clinic (min): 32 minutes    Subjective: Patient states he's been doing okay, sleeping is still painful.       Objective: See treatment diary below      Assessment: Tolerated treatment well. Patient exhibited good technique with therapeutic exercises and would benefit from continued PT. 1:1 with Jyoti Fierro DPT for entirety of session.          Plan: Continue per plan of care.  Progress treament per protocol.      Precautions: Surgery on 2025      POC expires Unit limit Auth Expiration date PT/OT + Visit Limit?   2025 BOMN N/a BOMN         Visit/Unit Tracking  AUTH Status:  Date     N/a Used 1 2 3 4 5     Remaining             Pertinent Findings:      POC End Date: 2025                                                                                          Test / Measure     FOTO (Predicted 71) 49                   Manuals         PROM   TT TT SW TT 5'                                                Neuro Re-Ed             Shrugs   20x5\"  20x5\"  20 x5\"  20x5\"         Scap Retractions              Wrist Curl   2xF 5#  2x20 5# 2x20 5# 2x20 5#         Wrist Ext  2xF 5#  2x20 5#  2x20 5#  2x20 5#         Gripper   Black Gripper 20x5\"  Black Gripper 20x5\"  Black Gripper 20x5\" Black Gripper 20x5\"        Hammer Pro/Sup  2x10 2#  2x10 2# 2x10 2# 2x10 2#                                   Ther Ex             Pendulums Fwd/lat/cir  20x ea  20x ea  20x  20x         Table Slides F  20x5\"  8x5\" P!  X15 5\"  20x 5\"         Supine AROM Shoulder F/ER   2x20  20x5\" ea                                                                          Ther Activity                "                        Gait Training                                       Modalities             Ice                           Post-op RIGHT shoulder s/p arthroscopic labrum repair.   Start PT 5-7 days post-op   Labral Repair Protocol     Phase 1 0-6 Weeks   0-2 Weeks: No shoulder motion, may do wrist, hand, finger motion   2-4 Weeks: May do pendulums, wrist, hand finger motion Remain in sling at all times; may remove for showering/exercises starting at 2 weeks Restrict motion to 90°FF/ 20° ER at side/ IR to stomach/ 45° ABO, PROMaAAROMaAROM as tolerated     Phase 2 6-12 Weeks Begin to wean sling at 5-6 weeks based on MD clearance (may use during daytime only at 4 weeks) Begin AAROM then AROM of elbow Rotator cuff and deltoid isometrics at 8 weeks Begin scapular exercises     Phase 3 12-16 Weeks Advance to full ROM as tolerated Emphasize ER and latissimus eccentrics, glenohumeral stabilization Begin upper body endurance activities Cycling and running ok at 12 weeks Full overhead throwing at 16 weeks     Phase 4 4-5 Months Aggressive scapular stabilization and eccentric strengthening Begin plyometric and throwing program Maintain full motion

## 2025-06-18 ENCOUNTER — OFFICE VISIT (OUTPATIENT)
Dept: PHYSICAL THERAPY | Facility: REHABILITATION | Age: 35
End: 2025-06-18
Payer: COMMERCIAL

## 2025-06-18 DIAGNOSIS — S43.431D TEAR OF RIGHT GLENOID LABRUM, SUBSEQUENT ENCOUNTER: Primary | ICD-10-CM

## 2025-06-18 PROCEDURE — 97110 THERAPEUTIC EXERCISES: CPT

## 2025-06-18 PROCEDURE — 97140 MANUAL THERAPY 1/> REGIONS: CPT

## 2025-06-18 PROCEDURE — 97112 NEUROMUSCULAR REEDUCATION: CPT

## 2025-06-18 NOTE — PROGRESS NOTES
"Daily Note     Today's date: 2025  Patient name: Kennedy Carter  : 1990  MRN: [id number]  Referring provider: Abi Sexton PA-C  Dx:   Encounter Diagnosis     ICD-10-CM    1. Tear of right glenoid labrum, subsequent encounter  S43.431D             Start Time: 852  Stop Time: 933  Total time in clinic (min): 41 minutes    Subjective: Patient states he's been doing okay, sleeping is still painful.       Objective: See treatment diary below      Assessment: Tolerated treatment well. Patient exhibited good technique with therapeutic exercises and would benefit from continued PT. 1:1 with Jyoti Fierro, DPT for 38 min and IEP for remainder.          Plan: Continue per plan of care.  Progress treament per protocol.      Precautions: Surgery on 2025      POC expires Unit limit Auth Expiration date PT/OT + Visit Limit?   2025 BOMN N/a BOMN         Visit/Unit Tracking  AUTH Status:  Date     N/a Used 1 2 3 4 5     Remaining             Pertinent Findings:      POC End Date: 2025                                                                                          Test / Measure     FOTO (Predicted 71) 49                   Manuals        PROM   TT TT SW TT 5'  TT 8'                                               Neuro Re-Ed             Shrugs   20x5\"  20x5\"  20 x5\"  20x5\"  2x20 5\"        Scap Retractions              Wrist Curl   2xF 5#  2x20 5# 2x20 5# 2x20 5#  2x20 5#        Wrist Ext  2xF 5#  2x20 5#  2x20 5#  2x20 5#  2x20 5#        Gripper   Black Gripper 20x5\"  Black Gripper 20x5\"  Black Gripper 20x5\" Black Gripper 20x5\" Black Gripper 30x5\"       Hammer Pro/Sup  2x10 2#  2x10 2# 2x10 2# 2x10 2#  3x15 2#                                  Ther Ex             Pendulums Fwd/lat/cir  20x ea  20x ea  20x  20x  20x ea        Table Slides F  20x5\"  8x5\" P!  X15 5\"  20x 5\"  30x5\"        Supine AROM Shoulder F/ER   2x20  20x5\" ea  20x5\" ea          "                                                                Ther Activity                                       Gait Training                                       Modalities             Ice                           Post-op RIGHT shoulder s/p arthroscopic labrum repair.   Start PT 5-7 days post-op   Labral Repair Protocol     Phase 1 0-6 Weeks   0-2 Weeks: No shoulder motion, may do wrist, hand, finger motion   2-4 Weeks: May do pendulums, wrist, hand finger motion Remain in sling at all times; may remove for showering/exercises starting at 2 weeks Restrict motion to 90°FF/ 20° ER at side/ IR to stomach/ 45° ABO, PROMaAAROMaAROM as tolerated     Phase 2 6-12 Weeks Begin to wean sling at 5-6 weeks based on MD clearance (may use during daytime only at 4 weeks) Begin AAROM then AROM of elbow Rotator cuff and deltoid isometrics at 8 weeks Begin scapular exercises     Phase 3 12-16 Weeks Advance to full ROM as tolerated Emphasize ER and latissimus eccentrics, glenohumeral stabilization Begin upper body endurance activities Cycling and running ok at 12 weeks Full overhead throwing at 16 weeks     Phase 4 4-5 Months Aggressive scapular stabilization and eccentric strengthening Begin plyometric and throwing program Maintain full motion

## 2025-06-23 ENCOUNTER — OFFICE VISIT (OUTPATIENT)
Dept: PHYSICAL THERAPY | Facility: REHABILITATION | Age: 35
End: 2025-06-23
Payer: COMMERCIAL

## 2025-06-23 DIAGNOSIS — S43.431D TEAR OF RIGHT GLENOID LABRUM, SUBSEQUENT ENCOUNTER: Primary | ICD-10-CM

## 2025-06-23 PROCEDURE — 97112 NEUROMUSCULAR REEDUCATION: CPT

## 2025-06-23 PROCEDURE — 97110 THERAPEUTIC EXERCISES: CPT

## 2025-06-23 NOTE — PROGRESS NOTES
"Daily Note     Today's date: 2025  Patient name: Kennedy Carter  : 1990  MRN: [id number]  Referring provider: Abi Sexton PA-C  Dx:   Encounter Diagnosis     ICD-10-CM    1. Tear of right glenoid labrum, subsequent encounter  S43.431D               Start Time: 1220  Stop Time: 1250  Total time in clinic (min): 30 minutes    Subjective: Patient states his shoulder has been feeling much better then before and is less painful. He is able to sleep better as a result.       Objective: See treatment diary below      Assessment: Tolerated treatment well. Patient exhibited good technique with therapeutic exercises and would benefit from continued PT. 1:1 with Jyoti Fierro DPT for entirety of session.         Plan: Continue per plan of care.  Progress treament per protocol.      Precautions: Surgery on 2025      POC expires Unit limit Auth Expiration date PT/OT + Visit Limit?   2025 BOMN N/a BOMN         Visit/Unit Tracking  AUTH Status:  Date    N/a Used 1 2 3 4 5 6 7    Remaining              Pertinent Findings:      POC End Date: 2025                                                                                          Test / Measure     FOTO (Predicted 71) 49                   Manuals       PROM   TT TT SW TT 5'  TT 8'  TT 8'                                              Neuro Re-Ed             Shrugs   20x5\"  20x5\"  20 x5\"  20x5\"  2x20 5\"  2x20 5\"       Scap Retractions              Wrist Curl   2xF 5#  2x20 5# 2x20 5# 2x20 5#  2x20 5#  2x20 8#       Wrist Ext  2xF 5#  2x20 5#  2x20 5#  2x20 5#  2x20 5#  2x20 5#       Gripper   Black Gripper 20x5\"  Black Gripper 20x5\"  Black Gripper 20x5\" Black Gripper 20x5\" Black Gripper 30x5\" Black Gripper 30x5\"      Hammer Pro/Sup  2x10 2#  2x10 2# 2x10 2# 2x10 2#  3x15 2#  3x15 2#                                 Ther Ex             Pendulums Fwd/lat/cir  20x ea  20x ea  20x  20x  20x " "ea  20x ea       Table Slides F  20x5\"  8x5\" P!  X15 5\"  20x 5\"  30x5\"  30x5\"       Supine AROM Shoulder F/ER   2x20  20x5\" ea  20x5\" ea  20x5\" ea                                                                        Ther Activity                                       Gait Training                                       Modalities             Ice                           Post-op RIGHT shoulder s/p arthroscopic labrum repair.   Start PT 5-7 days post-op   Labral Repair Protocol     Phase 1 0-6 Weeks   0-2 Weeks: No shoulder motion, may do wrist, hand, finger motion   2-4 Weeks: May do pendulums, wrist, hand finger motion Remain in sling at all times; may remove for showering/exercises starting at 2 weeks Restrict motion to 90°FF/ 20° ER at side/ IR to stomach/ 45° ABO, PROMaAAROMaAROM as tolerated     Phase 2 6-12 Weeks Begin to wean sling at 5-6 weeks based on MD clearance (may use during daytime only at 4 weeks) Begin AAROM then AROM of elbow Rotator cuff and deltoid isometrics at 8 weeks Begin scapular exercises     Phase 3 12-16 Weeks Advance to full ROM as tolerated Emphasize ER and latissimus eccentrics, glenohumeral stabilization Begin upper body endurance activities Cycling and running ok at 12 weeks Full overhead throwing at 16 weeks     Phase 4 4-5 Months Aggressive scapular stabilization and eccentric strengthening Begin plyometric and throwing program Maintain full motion               "

## 2025-06-25 ENCOUNTER — OFFICE VISIT (OUTPATIENT)
Dept: PHYSICAL THERAPY | Facility: REHABILITATION | Age: 35
End: 2025-06-25
Payer: COMMERCIAL

## 2025-06-25 DIAGNOSIS — S43.431D TEAR OF RIGHT GLENOID LABRUM, SUBSEQUENT ENCOUNTER: Primary | ICD-10-CM

## 2025-06-25 PROCEDURE — 97110 THERAPEUTIC EXERCISES: CPT

## 2025-06-25 PROCEDURE — 97112 NEUROMUSCULAR REEDUCATION: CPT

## 2025-06-25 NOTE — PROGRESS NOTES
"Daily Note     Today's date: 2025  Patient name: Kennedy Carter  : 1990  MRN: [id number]  Referring provider: Abi Sexton PA-C  Dx:   Encounter Diagnosis     ICD-10-CM    1. Tear of right glenoid labrum, subsequent encounter  S43.431D                 Start Time: 1135  Stop Time: 1210  Total time in clinic (min): 35 minutes    Subjective: Patient states his shoulder is the same as last time, during the day his pain levels are low except for when he lays down to sleep. He feels it is positional.       Objective: See treatment diary below      Assessment: Session repeated as per last session. Pt is progressing well according to protocol. Tolerated treatment well. Patient exhibited good technique with therapeutic exercises and would benefit from continued PT. 1:1 with Jyoti Fierro DPT for entirety of session.         Plan: Continue per plan of care.  Progress treament per protocol.      Precautions: Surgery on 2025      POC expires Unit limit Auth Expiration date PT/OT + Visit Limit?   2025 BOMN N/a BOMN         Visit/Unit Tracking  AUTH Status:  Date    N/a Used 1 2 3 4 5 6 7 8    Remaining               Pertinent Findings:      POC End Date: 2025                                                                                          Test / Measure     FOTO (Predicted 71) 49                       Manuals      PROM   TT TT SW TT 5'  TT 8'  TT 8'  TT 8'                                            Neuro Re-Ed             Shrugs   20x5\"  20x5\"  20 x5\"  20x5\"  2x20 5\"  2x20 5\"  2x20 5\"      Scap Retractions              Wrist Curl   2xF 5#  2x20 5# 2x20 5# 2x20 5#  2x20 5#  2x20 8#  2x20 8#      Wrist Ext  2xF 5#  2x20 5#  2x20 5#  2x20 5#  2x20 5#  2x20 5#  2x20 5#      Gripper   Black Gripper 20x5\"  Black Gripper 20x5\"  Black Gripper 20x5\" Black Gripper 20x5\" Black Gripper 30x5\" Black Gripper 30x5\" Black " "Gripper 30x5\"     Hammer Pro/Sup  2x10 2#  2x10 2# 2x10 2# 2x10 2#  3x15 2#  3x15 2#  3x15 2#      AAROM Curl/ER        20x5\" ea                  Ther Ex             Pendulums Fwd/lat/cir  20x ea  20x ea  20x  20x  20x ea  20x ea  20x ea      Table Slides F  20x5\"  8x5\" P!  X15 5\"  20x 5\"  30x5\"  30x5\"  30x5\"      Supine AROM Shoulder F/ER   2x20  20x5\" ea  20x5\" ea  20x5\" ea  20x5\" ea                                                                       Ther Activity                                       Gait Training                                       Modalities             Ice                           Post-op RIGHT shoulder s/p arthroscopic labrum repair.   Start PT 5-7 days post-op   Labral Repair Protocol     Phase 1 0-6 Weeks   0-2 Weeks: No shoulder motion, may do wrist, hand, finger motion   2-4 Weeks: May do pendulums, wrist, hand finger motion Remain in sling at all times; may remove for showering/exercises starting at 2 weeks Restrict motion to 90°FF/ 20° ER at side/ IR to stomach/ 45° ABO, PROMaAAROMaAROM as tolerated     Phase 2 6-12 Weeks Begin to wean sling at 5-6 weeks based on MD clearance (may use during daytime only at 4 weeks) Begin AAROM then AROM of elbow Rotator cuff and deltoid isometrics at 8 weeks Begin scapular exercises     Phase 3 12-16 Weeks Advance to full ROM as tolerated Emphasize ER and latissimus eccentrics, glenohumeral stabilization Begin upper body endurance activities Cycling and running ok at 12 weeks Full overhead throwing at 16 weeks     Phase 4 4-5 Months Aggressive scapular stabilization and eccentric strengthening Begin plyometric and throwing program Maintain full motion               "

## 2025-06-30 ENCOUNTER — OFFICE VISIT (OUTPATIENT)
Dept: PHYSICAL THERAPY | Facility: REHABILITATION | Age: 35
End: 2025-06-30
Payer: COMMERCIAL

## 2025-06-30 DIAGNOSIS — S43.431D TEAR OF RIGHT GLENOID LABRUM, SUBSEQUENT ENCOUNTER: Primary | ICD-10-CM

## 2025-06-30 PROCEDURE — 97112 NEUROMUSCULAR REEDUCATION: CPT

## 2025-06-30 PROCEDURE — 97110 THERAPEUTIC EXERCISES: CPT

## 2025-06-30 NOTE — PROGRESS NOTES
"Daily Note     Today's date: 2025  Patient name: Kennedy Carter  : 1990  MRN: [id number]  Referring provider: Abi Sexton PA-C  Dx:   Encounter Diagnosis     ICD-10-CM    1. Tear of right glenoid labrum, subsequent encounter  S43.431D                   Start Time: 1435  Stop Time: 1510  Total time in clinic (min): 35 minutes    Subjective: Patient states his shoulder is the same as last time, during the day his pain levels are low except for when he lays down to sleep. He feels it is positional.       Objective: See treatment diary below      Assessment: Session repeated as per last session. Pt is progressing well according to protocol. Tolerated treatment well. Patient exhibited good technique with therapeutic exercises and would benefit from continued PT. 1:1 with Jyoti Fierro, DPT for 24 min and IEP for remainder         Plan: Continue per plan of care.  Progress treament per protocol.      Precautions: Surgery on 2025      POC expires Unit limit Auth Expiration date PT/OT + Visit Limit?   2025 BOMN N/a BOMN         Visit/Unit Tracking  AUTH Status:  Date    N/a Used 1 2 3 4 5 6 7 8 9    Remaining                Pertinent Findings:      POC End Date: 2025                                                                                          Test / Measure     FOTO (Predicted 71) 49                       Manuals     PROM   TT TT SW TT 5'  TT 8'  TT 8'  TT 8'                                            Neuro Re-Ed             Shrugs   20x5\"  20x5\"  20 x5\"  20x5\"  2x20 5\"  2x20 5\"  2x20 5\"  2x10 5\"     Scap Retractions              Wrist Curl   2xF 5#  2x20 5# 2x20 5# 2x20 5#  2x20 5#  2x20 8#  2x20 8#  2x20 10#     Wrist Ext  2xF 5#  2x20 5#  2x20 5#  2x20 5#  2x20 5#  2x20 5#  2x20 5#  2x20 8#    Gripper   Black Gripper 20x5\"  Black Gripper 20x5\"  Black Gripper 20x5\" Black Gripper 20x5\" Black " "Gripper 30x5\" Black Gripper 30x5\" Black Gripper 30x5\" Black Gripper 30x5\"    Hammer Pro/Sup  2x10 2#  2x10 2# 2x10 2# 2x10 2#  3x15 2#  3x15 2#  3x15 2#  3x15 2#     AAROM Curl/ER        20x5\" ea 2x20 3\"     Supine Punch          2x10 5\"                  Ther Ex             Pendulums Fwd/lat/cir  20x ea  20x ea  20x  20x  20x ea  20x ea  20x ea  20x ea     Table Slides F  20x5\"  8x5\" P!  X15 5\"  20x 5\"  30x5\"  30x5\"  30x5\"  30x5\"     Supine AROM Shoulder F/ER   2x20  20x5\" ea  20x5\" ea  20x5\" ea  20x5\" ea  2x20 5\" ea                                                                     Ther Activity                                       Gait Training                                       Modalities             Ice                           Post-op RIGHT shoulder s/p arthroscopic labrum repair.   Start PT 5-7 days post-op   Labral Repair Protocol     Phase 1 0-6 Weeks   0-2 Weeks: No shoulder motion, may do wrist, hand, finger motion   2-4 Weeks: May do pendulums, wrist, hand finger motion Remain in sling at all times; may remove for showering/exercises starting at 2 weeks Restrict motion to 90°FF/ 20° ER at side/ IR to stomach/ 45° ABO, PROMaAAROMaAROM as tolerated     Phase 2 6-12 Weeks Begin to wean sling at 5-6 weeks based on MD clearance (may use during daytime only at 4 weeks) Begin AAROM then AROM of elbow Rotator cuff and deltoid isometrics at 8 weeks Begin scapular exercises     Phase 3 12-16 Weeks Advance to full ROM as tolerated Emphasize ER and latissimus eccentrics, glenohumeral stabilization Begin upper body endurance activities Cycling and running ok at 12 weeks Full overhead throwing at 16 weeks     Phase 4 4-5 Months Aggressive scapular stabilization and eccentric strengthening Begin plyometric and throwing program Maintain full motion               "

## 2025-07-02 ENCOUNTER — OFFICE VISIT (OUTPATIENT)
Dept: PHYSICAL THERAPY | Facility: REHABILITATION | Age: 35
End: 2025-07-02
Payer: COMMERCIAL

## 2025-07-02 DIAGNOSIS — S43.431D TEAR OF RIGHT GLENOID LABRUM, SUBSEQUENT ENCOUNTER: Primary | ICD-10-CM

## 2025-07-02 PROCEDURE — 97112 NEUROMUSCULAR REEDUCATION: CPT

## 2025-07-02 PROCEDURE — 97110 THERAPEUTIC EXERCISES: CPT

## 2025-07-02 NOTE — PROGRESS NOTES
"Daily Note     Today's date: 2025  Patient name: Kennedy Carter  : 1990  MRN: [id number]  Referring provider: Abi Sexton PA-C  Dx:   Encounter Diagnosis     ICD-10-CM    1. Tear of right glenoid labrum, subsequent encounter  S43.431D                              Subjective: Occasional sharp pain but very rarely with quick sudden movements.       Objective: See treatment diary below      Assessment: Tolerated treatment well. Removed abductor pillow and able to introduce submax deltoid isometrics per protocol. Progressed to sidelying ER as per primary PT instruction. Patient exhibited good technique with therapeutic exercises and would benefit from continued PT.         Plan: Continue per plan of care.  Progress treament per protocol.      Precautions: Surgery on 2025      POC expires Unit limit Auth Expiration date PT/OT + Visit Limit?   2025 BOMN N/a BOMN         Visit/Unit Tracking  AUTH Status:  Date  7   N/a Used 1 2 3 4 5 6 7 8 9 10    Remaining                 Pertinent Findings:      POC End Date: 2025                                                                                          Test / Measure     FOTO (Predicted 71) 49                       Manuals  72   PROM   TT TT SW TT 5'  TT 8'  TT 8'  TT 8'  CM 8'                                           Neuro Re-Ed             Shrugs   20x5\"  20x5\"  20 x5\"  20x5\"  2x20 5\"  2x20 5\"  2x20 5\"  2x10 5\"  2x10 5\"   Scap Retractions              Wrist Curl   2xF 5#  2x20 5# 2x20 5# 2x20 5#  2x20 5#  2x20 8#  2x20 8#  2x20 10#  2x10  10#   Wrist Ext  2xF 5#  2x20 5#  2x20 5#  2x20 5#  2x20 5#  2x20 5#  2x20 5#  2x20 8# 2x20  8#   Gripper   Black Gripper 20x5\"  Black Gripper 20x5\"  Black Gripper 20x5\" Black Gripper 20x5\" Black Gripper 30x5\" Black Gripper 30x5\" Black Gripper 30x5\" Black Gripper 30x5\" Black gripper  30x5\"   Hammer Pro/Sup  " "2x10 2#  2x10 2# 2x10 2# 2x10 2#  3x15 2#  3x15 2#  3x15 2#  3x15 2#  3x15  2#   AAROM Curl/ER        20x5\" ea 2x20 3\"  2x20  3\"   Supine Punch          2x10 5\"  2x10  5\"   Submax iso x6          5\"x10 ea.                 Ther Ex          7/2   Pendulums Fwd/lat/cir  20x ea  20x ea  20x  20x  20x ea  20x ea  20x ea  20x ea  20x ea   Table Slides F  20x5\"  8x5\" P!  X15 5\"  20x 5\"  30x5\"  30x5\"  30x5\"  30x5\"  30x5\"   Supine AROM Shoulder F/ER   2x20  20x5\" ea  20x5\" ea  20x5\" ea  20x5\" ea  2x20 5\" ea Supine flex 2x20    S/L ER  2x10                                                                    Ther Activity                                       Gait Training                                       Modalities             Ice                           Post-op RIGHT shoulder s/p arthroscopic labrum repair.   Start PT 5-7 days post-op   Labral Repair Protocol     Phase 1 0-6 Weeks   0-2 Weeks: No shoulder motion, may do wrist, hand, finger motion   2-4 Weeks: May do pendulums, wrist, hand finger motion Remain in sling at all times; may remove for showering/exercises starting at 2 weeks Restrict motion to 90°FF/ 20° ER at side/ IR to stomach/ 45° ABO, PROMaAAROMaAROM as tolerated     Phase 2 6-12 Weeks Begin to wean sling at 5-6 weeks based on MD clearance (may use during daytime only at 4 weeks) Begin AAROM then AROM of elbow Rotator cuff and deltoid isometrics at 8 weeks Begin scapular exercises     Phase 3 12-16 Weeks Advance to full ROM as tolerated Emphasize ER and latissimus eccentrics, glenohumeral stabilization Begin upper body endurance activities Cycling and running ok at 12 weeks Full overhead throwing at 16 weeks     Phase 4 4-5 Months Aggressive scapular stabilization and eccentric strengthening Begin plyometric and throwing program Maintain full motion               "

## 2025-07-07 ENCOUNTER — EVALUATION (OUTPATIENT)
Dept: PHYSICAL THERAPY | Facility: REHABILITATION | Age: 35
End: 2025-07-07
Payer: COMMERCIAL

## 2025-07-07 DIAGNOSIS — S43.431D TEAR OF RIGHT GLENOID LABRUM, SUBSEQUENT ENCOUNTER: Primary | ICD-10-CM

## 2025-07-07 PROCEDURE — 97110 THERAPEUTIC EXERCISES: CPT

## 2025-07-07 PROCEDURE — 97164 PT RE-EVAL EST PLAN CARE: CPT

## 2025-07-07 NOTE — PROGRESS NOTES
PT Re-Evaluation      Today's date: 2025  Patient name: Kennedy Carter  : 1990  MRN: [id number]  Referring provider: Abi Sexton PA-C  Dx:   Encounter Diagnosis     ICD-10-CM    1. Tear of right glenoid labrum, subsequent encounter  S43.431D                     Start Time: 1220  Stop Time: 1300  Total time in clinic (min): 40 minutes    Subjective: Sudden movements no long cause sharp pain, its more dull now.     Pain: Best-  0/10, Worst-  6/10, Current-  1/10 Pain at night is much less then before. He rarely gets bad pain unless he did a lot that day.     Objective: See treatment diary below      Assessment:  Kennedy Carter is a 35 y.o. male presenting 7 weeks s/p right labral repair performed by Dr. Granados on 2025. Primary impairments include limited ROM, strength and pain. Will benefit from skilled PT interventions for these impairments to return to PLOF. Tolerated treatment well. Removed abductor pillow and able to introduce submax deltoid isometrics per protocol. Progressed to sidelying ER as per primary PT instruction. Patient exhibited good technique with therapeutic exercises and would benefit from continued PT. 1:1 with Jyoti Fierro DPT for entirety of session.     Goals     Short Term Goals:  In 2 weeks, the patient will:  1. Decrease worst pain by 1 point subjectively MET   2. Increase shoulder flexion to 90 degrees MET  3. Supervision with HEP for self care MET     Long Term Goals:  In 12 weeks, the patient will:  1. Increase right shoulder strength to with in 80% of left side Progressing  2. FOTO to greater than predicted value Progressing  3. Independent with HEP for selfcare MET      Plan: Continue per plan of care.  Progress treament per protocol.      Precautions: Surgery on 2025      POC expires Unit limit Auth Expiration date PT/OT + Visit Limit?   2025 BOMN N/a BOMN         Visit/Unit Tracking  AUTH Status:  Date    N/a  "Used 1 2 3 4 5 6 7 8 9 10 11    Remaining                  Pertinent Findings:      POC End Date: 8/25/2025                                                                                          Test / Measure  6/2 7/7   FOTO (Predicted 71) 49 60   Shoulder ROM F/Abd/ER  120/83/20                  Manuals 6/2 6/4 6/11 6/12 6/16 6/18 6/23 6/25 6/30 7/2 7/7 (Wk7)    PROM   TT TT SW TT 5'  TT 8'  TT 8'  TT 8'  CM 8'                                               Neuro Re-Ed              Shrugs   20x5\"  20x5\"  20 x5\"  20x5\"  2x20 5\"  2x20 5\"  2x20 5\"  2x10 5\"  2x10 5\"    Scap Retractions               Wrist Curl   2xF 5#  2x20 5# 2x20 5# 2x20 5#  2x20 5#  2x20 8#  2x20 8#  2x20 10#  2x10  10#    Wrist Ext  2xF 5#  2x20 5#  2x20 5#  2x20 5#  2x20 5#  2x20 5#  2x20 5#  2x20 8# 2x20  8#    Gripper   Black Gripper 20x5\"  Black Gripper 20x5\"  Black Gripper 20x5\" Black Gripper 20x5\" Black Gripper 30x5\" Black Gripper 30x5\" Black Gripper 30x5\" Black Gripper 30x5\" Black gripper  30x5\"    Hammer Pro/Sup  2x10 2#  2x10 2# 2x10 2# 2x10 2#  3x15 2#  3x15 2#  3x15 2#  3x15 2#  3x15  2# D/c   AAROM Curl/ER        20x5\" ea 2x20 3\"  2x20  3\"    Supine Punch          2x10 5\"  2x10  5\" 2x10 5\"    Submax iso x6          5\"x10 ea.                   Ther Ex              Pendulums Fwd/lat/cir  20x ea  20x ea  20x  20x  20x ea  20x ea  20x ea  20x ea  20x ea 20x ea    Table Slides F  20x5\"  8x5\" P!  X15 5\"  20x 5\"  30x5\"  30x5\"  30x5\"  30x5\"  30x5\" 30x5\"    Supine AROM Shoulder F/ER   2x20  20x5\" ea  20x5\" ea  20x5\" ea  20x5\" ea  2x20 5\" ea Supine flex 2x20    S/L ER  2x10 Supine flex 20x5\"     Supine Cane ER 20x5\"    Wall Slides               Aylin's            3'                                              Ther Activity                                          Gait Training                                          Modalities              Ice                             Post-op RIGHT shoulder s/p arthroscopic labrum repair.     Phase 2 " 6-12 Weeks Begin to wean sling at 5-6 weeks based on MD clearance (may use during daytime only at 4 weeks)   Begin AAROM then AROM of elbow   Rotator cuff and deltoid isometrics at 8 weeks   Begin scapular exercises     Phase 3 12-16 Weeks Advance to full ROM as tolerated Emphasize ER and latissimus eccentrics, glenohumeral stabilization Begin upper body endurance activities Cycling and running ok at 12 weeks Full overhead throwing at 16 weeks     Phase 4 4-5 Months Aggressive scapular stabilization and eccentric strengthening Begin plyometric and throwing program Maintain full motion

## 2025-07-07 NOTE — LETTER
2025    Abi Sexton PA-C  [address]  [address]  [facility] [address]    Patient: Kennedy Carter   YOB: 1990   Date of Visit: 2025     Encounter Diagnosis     ICD-10-CM    1. Tear of right glenoid labrum, subsequent encounter  S43.431D           Dear Dr. Abi Sexton PA-C:    Thank you for your recent referral of Kennedy Carter. Please review the attached evaluation summary from Kennedy's recent visit.     Please verify that you agree with the plan of care by signing the attached order.     If you have any questions or concerns, please do not hesitate to call.     I sincerely appreciate the opportunity to share in the care of one of your patients and hope to have another opportunity to work with you in the near future.       Sincerely,    Jyoti Fierro, PT      Referring Provider:      I certify that I have read the below Plan of Care and certify the need for these services furnished under this plan of treatment while under my care.                    Abi Sexton PA-C  [address]  [address]  [facility] [address]  Via In Basket          PT Re-Evaluation      Today's date: 2025  Patient name: Kennedy Carter  : 1990  MRN: [id number]  Referring provider: Abi Sexton PA-C  Dx:   Encounter Diagnosis     ICD-10-CM    1. Tear of right glenoid labrum, subsequent encounter  S43.431D                     Start Time: 1220  Stop Time: 1300  Total time in clinic (min): 40 minutes    Subjective: Sudden movements no long cause sharp pain, its more dull now.     Pain: Best-  0/10, Worst-  6/10, Current-  1/10 Pain at night is much less then before. He rarely gets bad pain unless he did a lot that day.     Objective: See treatment diary below      Assessment:  Kennedy Carter is a 35 y.o. male presenting 7 weeks s/p right labral repair performed by Dr. Granados on 2025. Primary impairments include limited ROM, strength and pain. Will benefit from skilled PT interventions for these  "impairments to return to PLOF. Tolerated treatment well. Removed abductor pillow and able to introduce submax deltoid isometrics per protocol. Progressed to sidelying ER as per primary PT instruction. Patient exhibited good technique with therapeutic exercises and would benefit from continued PT. 1:1 with Jyoti iFerro DPT for entirety of session.     Goals     Short Term Goals:  In 2 weeks, the patient will:  1. Decrease worst pain by 1 point subjectively MET   2. Increase shoulder flexion to 90 degrees MET  3. Supervision with HEP for self care MET     Long Term Goals:  In 12 weeks, the patient will:  1. Increase right shoulder strength to with in 80% of left side Progressing  2. FOTO to greater than predicted value Progressing  3. Independent with HEP for selfcare MET      Plan: Continue per plan of care.  Progress treament per protocol.      Precautions: Surgery on 5/21/2025      POC expires Unit limit Auth Expiration date PT/OT + Visit Limit?   8/25/2025 BOMN N/a BOMN         Visit/Unit Tracking  AUTH Status:  Date 6/2 6/4 6/11 6/12 6/16 6/18 6/23 6/25 6/30 7/2 7/7   N/a Used 1 2 3 4 5 6 7 8 9 10 11    Remaining                  Pertinent Findings:      POC End Date: 8/25/2025                                                                                          Test / Measure  6/2 7/7   FOTO (Predicted 71) 49 60   Shoulder ROM F/Abd/ER  120/83/20                  Manuals 6/2 6/4 6/11 6/12 6/16 6/18 6/23 6/25 6/30 7/2 7/7 (Wk7)    PROM   TT TT SW TT 5'  TT 8'  TT 8'  TT 8'  CM 8'                                               Neuro Re-Ed              Shrugs   20x5\"  20x5\"  20 x5\"  20x5\"  2x20 5\"  2x20 5\"  2x20 5\"  2x10 5\"  2x10 5\"    Scap Retractions               Wrist Curl   2xF 5#  2x20 5# 2x20 5# 2x20 5#  2x20 5#  2x20 8#  2x20 8#  2x20 10#  2x10  10#    Wrist Ext  2xF 5#  2x20 5#  2x20 5#  2x20 5#  2x20 5#  2x20 5#  2x20 5#  2x20 8# 2x20  8#    Gripper   Black Gripper 20x5\"  Black Gripper 20x5\"  Black " "Gripper 20x5\" Black Gripper 20x5\" Black Gripper 30x5\" Black Gripper 30x5\" Black Gripper 30x5\" Black Gripper 30x5\" Black gripper  30x5\"    Hammer Pro/Sup  2x10 2#  2x10 2# 2x10 2# 2x10 2#  3x15 2#  3x15 2#  3x15 2#  3x15 2#  3x15  2# D/c   AAROM Curl/ER        20x5\" ea 2x20 3\"  2x20  3\"    Supine Punch          2x10 5\"  2x10  5\" 2x10 5\"    Submax iso x6          5\"x10 ea.                   Ther Ex              Pendulums Fwd/lat/cir  20x ea  20x ea  20x  20x  20x ea  20x ea  20x ea  20x ea  20x ea 20x ea    Table Slides F  20x5\"  8x5\" P!  X15 5\"  20x 5\"  30x5\"  30x5\"  30x5\"  30x5\"  30x5\" 30x5\"    Supine AROM Shoulder F/ER   2x20  20x5\" ea  20x5\" ea  20x5\" ea  20x5\" ea  2x20 5\" ea Supine flex 2x20    S/L ER  2x10 Supine flex 20x5\"     Supine Cane ER 20x5\"    Wall Alicia Viera's            3'                                              Ther Activity                                          Gait Training                                          Modalities              Ice                             Post-op RIGHT shoulder s/p arthroscopic labrum repair.     Phase 2 6-12 Weeks Begin to wean sling at 5-6 weeks based on MD clearance (may use during daytime only at 4 weeks)   Begin AAROM then AROM of elbow   Rotator cuff and deltoid isometrics at 8 weeks   Begin scapular exercises     Phase 3 12-16 Weeks Advance to full ROM as tolerated Emphasize ER and latissimus eccentrics, glenohumeral stabilization Begin upper body endurance activities Cycling and running ok at 12 weeks Full overhead throwing at 16 weeks     Phase 4 4-5 Months Aggressive scapular stabilization and eccentric strengthening Begin plyometric and throwing program Maintain full motion                               "

## 2025-07-09 ENCOUNTER — OFFICE VISIT (OUTPATIENT)
Dept: PHYSICAL THERAPY | Facility: REHABILITATION | Age: 35
End: 2025-07-09
Payer: COMMERCIAL

## 2025-07-09 DIAGNOSIS — S43.431D TEAR OF RIGHT GLENOID LABRUM, SUBSEQUENT ENCOUNTER: Primary | ICD-10-CM

## 2025-07-09 PROCEDURE — 97110 THERAPEUTIC EXERCISES: CPT

## 2025-07-09 PROCEDURE — 97112 NEUROMUSCULAR REEDUCATION: CPT

## 2025-07-09 NOTE — PROGRESS NOTES
"Daily Note     Today's date: 2025  Patient name: Kennedy Carter  : 1990  MRN: [id number]  Referring provider: Abi Sexton PA-C  Dx:   Encounter Diagnosis     ICD-10-CM    1. Tear of right glenoid labrum, subsequent encounter  S43.431D           Start Time: 1135  Stop Time: 1213  Total time in clinic (min): 38 minutes    Subjective: Pt has no new complaints. His shoulder is feeling good.       Objective: See treatment diary below      Assessment: Tolerated treatment well. Patient would benefit from continued PT. 1:1 with Jyoti Fierro DPT for entirety of session.         Plan: Continue per plan of care.      Precautions: Surgery on 2025      POC expires Unit limit Auth Expiration date PT/OT + Visit Limit?   2025 BOMN N/a BOMN         Visit/Unit Tracking  AUTH Status:  Date    N/a Used 1 2 3 4 5 6 7 8 9 10 11 12    Remaining                   Pertinent Findings:      POC End Date: 2025                                                                                          Test / Measure     FOTO (Predicted 71) 49 60   Shoulder ROM F/Abd/ER  120/83/20                  Manuals  (Wk7)     PROM   TT TT SW TT 5'  TT 8'  TT 8'  TT 8'  CM 8'                                                   Neuro Re-Ed               Shrugs   20x5\"  20x5\"  20 x5\"  20x5\"  2x20 5\"  2x20 5\"  2x20 5\"  2x10 5\"  2x10 5\"  20x5\"    Scap Retractions             20x5\"    Wrist Curl   2xF 5#  2x20 5# 2x20 5# 2x20 5#  2x20 5#  2x20 8#  2x20 8#  2x20 10#  2x10  10#     Wrist Ext  2xF 5#  2x20 5#  2x20 5#  2x20 5#  2x20 5#  2x20 5#  2x20 5#  2x20 8# 2x20  8#     Gripper   Black Gripper 20x5\"  Black Gripper 20x5\"  Black Gripper 20x5\" Black Gripper 20x5\" Black Gripper 30x5\" Black Gripper 30x5\" Black Gripper 30x5\" Black Gripper 30x5\" Black gripper  30x5\"     Hammer Pro/Sup  2x10 2#  2x10 2# 2x10 2# 2x10 2#  3x15 2#  " "3x15 2#  3x15 2#  3x15 2#  3x15  2# D/c    Cane Curl        20x5\" ea 2x20 3\"  2x20  3\"  2x20 5#    Supine Punch          2x10 5\"  2x10  5\" 2x10 5\"  2x10 5\" 5#    Submax iso x6          5\"x10 ea.   5\"x10 ea                   Ther Ex               Pendulums Fwd/lat/cir  20x ea  20x ea  20x  20x  20x ea  20x ea  20x ea  20x ea  20x ea 20x ea  20x ea    Table Slides F  20x5\"  8x5\" P!  X15 5\"  20x 5\"  30x5\"  30x5\"  30x5\"  30x5\"  30x5\" 30x5\"  30x5\"    Supine AROM Shoulder F/ER   2x20  20x5\" ea  20x5\" ea  20x5\" ea  20x5\" ea  2x20 5\" ea Supine flex 2x20    S/L ER  2x10 Supine flex 20x5\"     Supine Cane ER 20x5\"  Supine flex 20x5\"     Supine Cane ER 20x5\"    Wall Climbers             10 laps    Pulley's            3'  4' Alt F/Abd                                                 Ther Activity                                             Gait Training                                             Modalities               Ice                               Post-op RIGHT shoulder s/p arthroscopic labrum repair.     Phase 2 6-12 Weeks Begin to wean sling at 5-6 weeks based on MD clearance (may use during daytime only at 4 weeks)   Begin AAROM then AROM of elbow   Rotator cuff and deltoid isometrics at 8 weeks   Begin scapular exercises     Phase 3 12-16 Weeks Advance to full ROM as tolerated Emphasize ER and latissimus eccentrics, glenohumeral stabilization Begin upper body endurance activities Cycling and running ok at 12 weeks Full overhead throwing at 16 weeks     Phase 4 4-5 Months Aggressive scapular stabilization and eccentric strengthening Begin plyometric and throwing program Maintain full motion                 "

## 2025-07-10 ENCOUNTER — OFFICE VISIT (OUTPATIENT)
Age: 35
End: 2025-07-10

## 2025-07-10 VITALS — WEIGHT: 275.57 LBS | HEIGHT: 73 IN | BODY MASS INDEX: 36.52 KG/M2

## 2025-07-10 DIAGNOSIS — Z98.890 STATUS POST LABRAL REPAIR OF SHOULDER: Primary | ICD-10-CM

## 2025-07-10 PROCEDURE — 99024 POSTOP FOLLOW-UP VISIT: CPT | Performed by: ORTHOPAEDIC SURGERY

## 2025-07-10 NOTE — PROGRESS NOTES
:  Assessment & Plan  Status post labral repair of shoulder  7 weeks S/P right shoulder arthroscopy with anterior labral repair, DOS 05/21/2025  Continue PT per protocol.   Work on motion, can progress motion  Stop sling  Recent PT notes reviewed.  Patient declined work note.  Follow up 8 weeks           S:  Doing well, pain controlled. Today, he reports shoulder soreness with PT exercises, sharp pains with sudden movements, and dull achy pain that can last for days. Since surgery he reports pain is night and day but he still has a long way to go. Has discontinued sling.    Exam:  Incision c/d/i   SILT ax/r/m/u  Motor intact ax/r/m/u   Hand wwp  Shoulder  . Shoulder ER 10. Shoulder IR AROM sacrum. Able to reach back of head with compensation.    Scribe Attestation      I,:  Faina Tom am acting as a scribe while in the presence of the attending physician.:       I,:  Bharat Granados MD personally performed the services described in this documentation    as scribed in my presence.:

## 2025-07-14 ENCOUNTER — APPOINTMENT (OUTPATIENT)
Dept: PHYSICAL THERAPY | Facility: REHABILITATION | Age: 35
End: 2025-07-14
Payer: COMMERCIAL

## 2025-07-16 ENCOUNTER — OFFICE VISIT (OUTPATIENT)
Dept: PHYSICAL THERAPY | Facility: REHABILITATION | Age: 35
End: 2025-07-16
Payer: COMMERCIAL

## 2025-07-16 DIAGNOSIS — S43.431D TEAR OF RIGHT GLENOID LABRUM, SUBSEQUENT ENCOUNTER: Primary | ICD-10-CM

## 2025-07-16 PROCEDURE — 97140 MANUAL THERAPY 1/> REGIONS: CPT

## 2025-07-16 PROCEDURE — 97110 THERAPEUTIC EXERCISES: CPT

## 2025-07-16 PROCEDURE — 97112 NEUROMUSCULAR REEDUCATION: CPT

## 2025-07-16 NOTE — PROGRESS NOTES
"Daily Note     Today's date: 2025  Patient name: Kennedy Carter  : 1990  MRN: [id number]  Referring provider: Abi Sexton PA-C  Dx:   Encounter Diagnosis     ICD-10-CM    1. Tear of right glenoid labrum, subsequent encounter  S43.431D             Start Time: 1142  Stop Time: 1220  Total time in clinic (min): 38 minutes    Subjective: Pt has no new complaints. His shoulder is feeling good.       Objective: See treatment diary below      Assessment: Tolerated treatment well. Patient would benefit from continued PT. 1:1 with Jyoti Fierro DPT for entirety of session.         Plan: Continue per plan of care.      Precautions: Surgery on 2025      POC expires Unit limit Auth Expiration date PT/OT + Visit Limit?   2025 BOMN N/a BOMN         Visit/Unit Tracking  AUTH Status:  Date    N/a Used 1 2 3 4 5 6 7 8 9 10 11 12 13    Remaining                    Pertinent Findings:      POC End Date: 2025                                                                                          Test / Measure     FOTO (Predicted 71) 49 60   Shoulder ROM F/Abd/ER  120/83/20                  Manuals  (Wk7)   (wk8)   PROM   TT TT SW TT 5'  TT 8'  TT 8'  TT 8'  CM 8'    TT   Shoulder Mobs              TT                                   Neuro Re-Ed                Shrugs   20x5\"  20x5\"  20 x5\"  20x5\"  2x20 5\"  2x20 5\"  2x20 5\"  2x10 5\"  2x10 5\"  20x5\"  20x5\" 5#    Scap Retractions             20x5\"  20x5\" 5#   Wrist Curl   2xF 5#  2x20 5# 2x20 5# 2x20 5#  2x20 5#  2x20 8#  2x20 8#  2x20 10#  2x10  10#      Wrist Ext  2xF 5#  2x20 5#  2x20 5#  2x20 5#  2x20 5#  2x20 5#  2x20 5#  2x20 8# 2x20  8#      Gripper   Black Gripper 20x5\"  Black Gripper 20x5\"  Black Gripper 20x5\" Black Gripper 20x5\" Black Gripper 30x5\" Black Gripper 30x5\" Black Gripper 30x5\" Black Gripper 30x5\" Black " "gripper  30x5\"      Hammer Pro/Sup  2x10 2#  2x10 2# 2x10 2# 2x10 2#  3x15 2#  3x15 2#  3x15 2#  3x15 2#  3x15  2# D/c     Cane Curl        20x5\" ea 2x20 3\"  2x20  3\"  2x20 5#  2x20 8#    Supine Punch          2x10 5\"  2x10  5\" 2x10 5\"  2x10 5\" 5#  2x10 5\" 5#    Submax iso x6          5\"x10 ea.   5\"x10 ea     Theraband ISO IR/ER/Ext                SL F/Abd/ER             2x10 ea    Ther Ex                Pendulums Fwd/lat/cir  20x ea  20x ea  20x  20x  20x ea  20x ea  20x ea  20x ea  20x ea 20x ea  20x ea  D/c   Table Slides F  20x5\"  8x5\" P!  X15 5\"  20x 5\"  30x5\"  30x5\"  30x5\"  30x5\"  30x5\" 30x5\"  30x5\"  D/c   Supine AROM Shoulder F/ER   2x20  20x5\" ea  20x5\" ea  20x5\" ea  20x5\" ea  2x20 5\" ea Supine flex 2x20    S/L ER  2x10 Supine flex 20x5\"     Supine Cane ER 20x5\"  Supine flex 20x5\"     Supine Cane ER 20x5\"  Supine flex 20x 3#    Supine Cane ER 20x5\"    Wall Climbers             10 laps  10 laps    Aylin's            3'  4' Alt F/Abd  4' Flex   No Monies                                                 Ther Activity                                                Gait Training                                                Modalities                Ice                                 Post-op RIGHT shoulder s/p arthroscopic labrum repair.     Phase 2 6-12 Weeks Begin to wean sling at 5-6 weeks based on MD clearance (may use during daytime only at 4 weeks)   Begin AAROM then AROM of elbow   Rotator cuff and deltoid isometrics at 8 weeks   Begin scapular exercises     Phase 3 12-16 Weeks Advance to full ROM as tolerated Emphasize ER and latissimus eccentrics, glenohumeral stabilization Begin upper body endurance activities Cycling and running ok at 12 weeks Full overhead throwing at 16 weeks     Phase 4 4-5 Months Aggressive scapular stabilization and eccentric strengthening Begin plyometric and throwing program Maintain full motion                 "

## 2025-07-18 ENCOUNTER — OFFICE VISIT (OUTPATIENT)
Dept: PHYSICAL THERAPY | Facility: REHABILITATION | Age: 35
End: 2025-07-18
Payer: COMMERCIAL

## 2025-07-18 DIAGNOSIS — S43.431D TEAR OF RIGHT GLENOID LABRUM, SUBSEQUENT ENCOUNTER: Primary | ICD-10-CM

## 2025-07-18 PROCEDURE — 97112 NEUROMUSCULAR REEDUCATION: CPT

## 2025-07-18 PROCEDURE — 97110 THERAPEUTIC EXERCISES: CPT

## 2025-07-18 NOTE — PROGRESS NOTES
"Daily Note     Today's date: 2025  Patient name: Kennedy Carter  : 1990  MRN: [id number]  Referring provider: Abi Sexton PA-C  Dx:   Encounter Diagnosis     ICD-10-CM    1. Tear of right glenoid labrum, subsequent encounter  S43.431D             Start Time: 1430  Stop Time: 1512  Total time in clinic (min): 42 minutes    Subjective: Pt has no new complaints, he felt a little bit of soreness after last session but this quickly dissipated.       Objective: See treatment diary below      Assessment: Patient is feeling more comfortable with current exercises. Tolerated treatment well. Patient would benefit from continued PT. 1:1 with Jyoti Fierro DPT for entirety of session.         Plan: Continue per plan of care.      Precautions: Surgery on 2025      POC expires Unit limit Auth Expiration date PT/OT + Visit Limit?   2025 BOMN N/a BOMN         Visit/Unit Tracking  AUTH Status:  Date    N/a Used 13 14    Remaining         Pertinent Findings:      POC End Date: 2025                                                                                          Test / Measure     FOTO (Predicted 71) 49 60   Shoulder ROM F/Abd/ER  120/83/20                  Manuals  (wk8)    PROM  TT    Shoulder Mobs  TT              Neuro Re-Ed     Shrugs  20x5\" 5#  20x5\" 5#    Scap Retractions  20x5\" 5# 20x5\" 5#    Wrist Curl      Wrist Ext     Gripper      Hammer Pro/Sup     Cane Curl 2x20 8#  2x20 8#    Supine Punch  2x10 5\" 5#  2x10 5\" 5#   Submax iso x6     Theraband ISO IR/ER/Ext     SL F/Abd/ER 2x10 ea  2x10 ea    Ther Ex     Pendulums Fwd/lat/cir D/c    Table Slides F D/c    Supine AROM Shoulder F/ER Supine flex 20x 3#    Supine Cane ER 20x5\"  Supine flex 20x 3#    Supine Cane ER 20x5\"    Wall Climbers  10 laps  10 laps    Pulley's  4' Flex 4' flex    No Monies   2x10 5\" Btb    UBE  4' Rev Only         Ther Activity               Gait Training               Modalities     Ice       "     Post-op RIGHT shoulder s/p arthroscopic labrum repair.     Phase 2 6-12 Weeks Begin to wean sling at 5-6 weeks based on MD clearance (may use during daytime only at 4 weeks)   Begin AAROM then AROM of elbow   Rotator cuff and deltoid isometrics at 8 weeks   Begin scapular exercises     Phase 3 12-16 Weeks Advance to full ROM as tolerated Emphasize ER and latissimus eccentrics, glenohumeral stabilization Begin upper body endurance activities Cycling and running ok at 12 weeks Full overhead throwing at 16 weeks     Phase 4 4-5 Months Aggressive scapular stabilization and eccentric strengthening Begin plyometric and throwing program Maintain full motion

## 2025-07-21 ENCOUNTER — OFFICE VISIT (OUTPATIENT)
Dept: PHYSICAL THERAPY | Facility: REHABILITATION | Age: 35
End: 2025-07-21
Payer: COMMERCIAL

## 2025-07-21 DIAGNOSIS — S43.431D TEAR OF RIGHT GLENOID LABRUM, SUBSEQUENT ENCOUNTER: Primary | ICD-10-CM

## 2025-07-21 PROCEDURE — 97110 THERAPEUTIC EXERCISES: CPT

## 2025-07-21 PROCEDURE — 97112 NEUROMUSCULAR REEDUCATION: CPT

## 2025-07-21 NOTE — PROGRESS NOTES
"Daily Note     Today's date: 2025  Patient name: Kennedy Carter  : 1990  MRN: [id number]  Referring provider: Abi Sexton PA-C  Dx:   Encounter Diagnosis     ICD-10-CM    1. Tear of right glenoid labrum, subsequent encounter  S43.431D           Start Time: 1532  Stop Time: 1610  Total time in clinic (min): 38 minutes      Subjective: Pt has no new complaints, he felt a little bit of soreness after last session but this quickly dissipated.       Objective: See treatment diary below      Assessment: Patient is feeling more comfortable with current exercises. Tolerated treatment well. Patient would benefit from continued PT. 1:1 with Jyoti Fierro DPT for entirety of session.       Plan: Continue per plan of care.      Precautions: Surgery on 2025      POC expires Unit limit Auth Expiration date PT/OT + Visit Limit?   2025 BOMN N/a BOMN         Visit/Unit Tracking  AUTH Status:  Date    N/a Used 13 14 15    Remaining          Pertinent Findings:      POC End Date: 2025                                                                                          Test / Measure     FOTO (Predicted 71) 49 60   Shoulder ROM F/Abd/ER  120/83/20                  Manuals  (wk8)    PROM  TT     Shoulder Mobs  TT                 Neuro Re-Ed      Shrugs  20x5\" 5#  20x5\" 5#  8   Scap Retractions  20x5\" 5# 20x5\" 5#  7   Wrist Curl       Wrist Ext      Gripper       Hammer Pro/Sup      Cane Curl 2x20 8#  2x20 8#  2x20 8#    Supine Punch  2x10 5\" 5#  2x10 5\" 5# 2x10 5\" 5#   Submax iso x6      Theraband ISO IR/ER/Ext      SL F/Abd/ER 2x10 ea  2x10 ea  2x10 ea    Ther Ex      Pendulums Fwd/lat/cir D/c     Table Slides F D/c     Supine AROM Shoulder F/ER Supine flex 20x 3#    Supine Cane ER 20x5\"  Supine flex 20x 3#    Supine Cane ER 20x5\"  Supine flex 20x 3#    Supine Cane ER 20x5\"    Wall Climbers  10 laps  10 laps  10 laps    Pulley's  4' Flex 4' flex     No Monies   2x10 5\" " Btb  9   UBE  4' Rev Only  3'/3'          Ther Activity                  Gait Training                  Modalities      Ice             Post-op RIGHT shoulder s/p arthroscopic labrum repair.     Phase 2 6-12 Weeks Begin to wean sling at 5-6 weeks based on MD clearance (may use during daytime only at 4 weeks)   Begin AAROM then AROM of elbow   Rotator cuff and deltoid isometrics at 8 weeks   Begin scapular exercises     Phase 3 12-16 Weeks Advance to full ROM as tolerated Emphasize ER and latissimus eccentrics, glenohumeral stabilization Begin upper body endurance activities Cycling and running ok at 12 weeks Full overhead throwing at 16 weeks     Phase 4 4-5 Months Aggressive scapular stabilization and eccentric strengthening Begin plyometric and throwing program Maintain full motion

## 2025-07-23 ENCOUNTER — APPOINTMENT (OUTPATIENT)
Dept: PHYSICAL THERAPY | Facility: REHABILITATION | Age: 35
End: 2025-07-23
Payer: COMMERCIAL

## 2025-07-25 ENCOUNTER — OFFICE VISIT (OUTPATIENT)
Dept: PHYSICAL THERAPY | Facility: REHABILITATION | Age: 35
End: 2025-07-25
Payer: COMMERCIAL

## 2025-07-25 DIAGNOSIS — S43.431D TEAR OF RIGHT GLENOID LABRUM, SUBSEQUENT ENCOUNTER: Primary | ICD-10-CM

## 2025-07-25 PROCEDURE — 97110 THERAPEUTIC EXERCISES: CPT

## 2025-07-25 PROCEDURE — 97112 NEUROMUSCULAR REEDUCATION: CPT

## 2025-07-25 NOTE — PROGRESS NOTES
"Daily Note     Today's date: 2025  Patient name: Kennedy Carter  : 1990  MRN: [id number]  Referring provider: Abi Sexton PA-C  Dx:   Encounter Diagnosis     ICD-10-CM    1. Tear of right glenoid labrum, subsequent encounter  S43.431D             Start Time: 923  Stop Time: 100  Total time in clinic (min): 38 minutes      Subjective: Pt has no new complaints, he felt a little bit of soreness after last session but this quickly dissipated.       Objective: See treatment diary below      Assessment: Patient is feeling more comfortable with current exercises. Tolerated treatment well. Patient would benefit from continued PT. 1:1 with Jyoti Fierro DPT for entirety of session.       Plan: Continue per plan of care.      Precautions: Surgery on 2025      POC expires Unit limit Auth Expiration date PT/OT + Visit Limit?   2025 BOMN N/a BOMN         Visit/Unit Tracking  AUTH Status:  Date    N/a Used 13 14 15 16    Remaining           Pertinent Findings:      POC End Date: 2025                                                                                          Test / Measure     FOTO (Predicted 71) 49 60   Shoulder ROM F/Abd/ER  120/83/20                  Manuals  (wk8)    PROM  TT      Shoulder Mobs  TT                    Neuro Re-Ed       Shrugs  20x5\" 5#  20x5\" 5#  20x5\" 5#  2x20 5\" 8#    Scap Retractions  20x5\" 5# 20x5\" 5#  20x5\" 5#  2x20 5\" 8#    Wrist Curl        Wrist Ext       Gripper        Hammer Pro/Sup       Cane Curl 2x20 8#  2x20 8#  2x20 8#  2x20 8#   Supine Punch  2x10 5\" 5#  2x10 5\" 5# 2x10 5\" 5# 2x10 5\" 8#    Submax iso x6       Theraband ISO IR/ER/Ext       SL F/Abd/ER 2x10 ea  2x10 ea  2x10 ea  2x10 ea    Ther Ex       Pendulums Fwd/lat/cir D/c      Table Slides F D/c      Supine AROM Shoulder F/ER Supine flex 20x 3#    Supine Cane ER 20x5\"  Supine flex 20x 3#    Supine Cane ER 20x5\"  Supine flex 20x 3#    Supine Cane ER " "20x5\"  Supine flex 20x 4#    Supine Cane ER 20x5\"    Wall Climbers  10 laps  10 laps  10 laps  20 laps    Pulley's  4' Flex 4' flex      No Monies   2x10 5\" Btb  2x10 5\" Btb  20x5\" Btb   UBE  4' Rev Only  3'/3'  4/4          Ther Activity                     Gait Training                     Modalities       Ice               Post-op RIGHT shoulder s/p arthroscopic labrum repair.     Phase 2 6-12 Weeks Begin to wean sling at 5-6 weeks based on MD clearance (may use during daytime only at 4 weeks)   Begin AAROM then AROM of elbow   Rotator cuff and deltoid isometrics at 8 weeks   Begin scapular exercises     Phase 3 12-16 Weeks Advance to full ROM as tolerated Emphasize ER and latissimus eccentrics, glenohumeral stabilization Begin upper body endurance activities Cycling and running ok at 12 weeks Full overhead throwing at 16 weeks     Phase 4 4-5 Months Aggressive scapular stabilization and eccentric strengthening Begin plyometric and throwing program Maintain full motion                 "

## 2025-07-28 ENCOUNTER — OFFICE VISIT (OUTPATIENT)
Dept: PHYSICAL THERAPY | Facility: REHABILITATION | Age: 35
End: 2025-07-28
Payer: COMMERCIAL

## 2025-07-28 DIAGNOSIS — S43.431D TEAR OF RIGHT GLENOID LABRUM, SUBSEQUENT ENCOUNTER: Primary | ICD-10-CM

## 2025-07-28 PROCEDURE — 97110 THERAPEUTIC EXERCISES: CPT

## 2025-07-28 PROCEDURE — 97112 NEUROMUSCULAR REEDUCATION: CPT

## 2025-07-30 ENCOUNTER — EVALUATION (OUTPATIENT)
Dept: PHYSICAL THERAPY | Facility: REHABILITATION | Age: 35
End: 2025-07-30
Payer: COMMERCIAL

## 2025-07-30 DIAGNOSIS — S43.431D TEAR OF RIGHT GLENOID LABRUM, SUBSEQUENT ENCOUNTER: Primary | ICD-10-CM

## 2025-07-30 PROCEDURE — 97112 NEUROMUSCULAR REEDUCATION: CPT

## 2025-07-30 PROCEDURE — 97164 PT RE-EVAL EST PLAN CARE: CPT

## 2025-07-30 PROCEDURE — 97110 THERAPEUTIC EXERCISES: CPT

## 2025-08-04 ENCOUNTER — OFFICE VISIT (OUTPATIENT)
Dept: PHYSICAL THERAPY | Facility: REHABILITATION | Age: 35
End: 2025-08-04
Payer: COMMERCIAL

## 2025-08-04 DIAGNOSIS — S43.431D TEAR OF RIGHT GLENOID LABRUM, SUBSEQUENT ENCOUNTER: Primary | ICD-10-CM

## 2025-08-04 PROCEDURE — 97112 NEUROMUSCULAR REEDUCATION: CPT

## 2025-08-04 PROCEDURE — 97110 THERAPEUTIC EXERCISES: CPT

## 2025-08-05 ENCOUNTER — TELEPHONE (OUTPATIENT)
Age: 35
End: 2025-08-05

## 2025-08-11 ENCOUNTER — OFFICE VISIT (OUTPATIENT)
Dept: PHYSICAL THERAPY | Facility: REHABILITATION | Age: 35
End: 2025-08-11
Payer: COMMERCIAL

## 2025-08-13 ENCOUNTER — OFFICE VISIT (OUTPATIENT)
Dept: PHYSICAL THERAPY | Facility: REHABILITATION | Age: 35
End: 2025-08-13
Payer: COMMERCIAL

## 2025-08-15 ENCOUNTER — OFFICE VISIT (OUTPATIENT)
Dept: PHYSICAL THERAPY | Facility: REHABILITATION | Age: 35
End: 2025-08-15
Payer: COMMERCIAL

## 2025-08-18 DIAGNOSIS — E03.9 ACQUIRED HYPOTHYROIDISM: ICD-10-CM

## 2025-08-19 ENCOUNTER — OFFICE VISIT (OUTPATIENT)
Dept: PHYSICAL THERAPY | Facility: REHABILITATION | Age: 35
End: 2025-08-19
Payer: COMMERCIAL

## 2025-08-19 DIAGNOSIS — S43.431D TEAR OF RIGHT GLENOID LABRUM, SUBSEQUENT ENCOUNTER: Primary | ICD-10-CM

## 2025-08-19 PROCEDURE — 97110 THERAPEUTIC EXERCISES: CPT

## 2025-08-19 PROCEDURE — 97112 NEUROMUSCULAR REEDUCATION: CPT

## 2025-08-19 RX ORDER — LEVOTHYROXINE SODIUM 75 UG/1
75 TABLET ORAL DAILY
Qty: 90 TABLET | Refills: 1 | Status: SHIPPED | OUTPATIENT
Start: 2025-08-19

## (undated) DEVICE — Device

## (undated) DEVICE — 3M™ STERI-STRIP™ REINFORCED ADHESIVE SKIN CLOSURES, R1547, 1/2 IN X 4 IN (12 MM X 100 MM), 6 STRIPS/ENVELOPE: Brand: 3M™ STERI-STRIP™

## (undated) DEVICE — POSITIONER TRIMANO LIMB BEACH CHAIR

## (undated) DEVICE — 3M™ STERI-DRAPE™ U-DRAPE 1015: Brand: STERI-DRAPE™

## (undated) DEVICE — SYRINGE 20ML LL

## (undated) DEVICE — PACK PBDS SHOULDER ARTHROSCOPY RF

## (undated) DEVICE — KIT DISP FIBERTAK CURVED SPEAR

## (undated) DEVICE — TUBING ARTHROSCOPIC WAVE  MAIN PUMP

## (undated) DEVICE — GLOVE INDICATOR PI UNDERGLOVE SZ 6.5 BLUE

## (undated) DEVICE — GLOVE PI ULTRA TOUCH SZ.8.0

## (undated) DEVICE — NEEDLE HYPO 23G X 1-1/2 IN

## (undated) DEVICE — GLOVE INDICATOR PI UNDERGLOVE SZ 8 BLUE

## (undated) DEVICE — PREP PAD BNS: Brand: CONVERTORS

## (undated) DEVICE — PROBE ABLATION  APOLLO RF 90 DEG MULTI PORT

## (undated) DEVICE — SUT MONOCRYL 3-0 PS-2 27 IN Y427H

## (undated) DEVICE — MAT ABSORBANT ARTHROSCOPY FLOOR 46 X 40 IN

## (undated) DEVICE — INTENDED FOR TISSUE SEPARATION, AND OTHER PROCEDURES THAT REQUIRE A SHARP SURGICAL BLADE TO PUNCTURE OR CUT.: Brand: BARD-PARKER ® CARBON RIB-BACK BLADES

## (undated) DEVICE — GLOVE PI ULTRA TOUCH SZ.6.5

## (undated) DEVICE — CANNULA 7 X70MM THRD SEAL SIDE PORT

## (undated) DEVICE — T-MAX DISPOSABLE FACE MASK 8 PER BOX

## (undated) DEVICE — PUDDLE VAC

## (undated) DEVICE — TUBING SUCTION 5MM X 12 FT